# Patient Record
Sex: FEMALE | Race: BLACK OR AFRICAN AMERICAN | NOT HISPANIC OR LATINO | URBAN - METROPOLITAN AREA
[De-identification: names, ages, dates, MRNs, and addresses within clinical notes are randomized per-mention and may not be internally consistent; named-entity substitution may affect disease eponyms.]

---

## 2017-03-13 ENCOUNTER — TRANSCRIBE ORDERS (OUTPATIENT)
Dept: PHYSICAL THERAPY | Facility: HOSPITAL | Age: 47
End: 2017-03-13

## 2017-03-13 DIAGNOSIS — M25.561 RIGHT KNEE PAIN, UNSPECIFIED CHRONICITY: Primary | ICD-10-CM

## 2017-03-23 ENCOUNTER — HOSPITAL ENCOUNTER (OUTPATIENT)
Dept: PHYSICAL THERAPY | Facility: HOSPITAL | Age: 47
Setting detail: THERAPIES SERIES
Discharge: HOME | End: 2017-03-23
Attending: SPECIALIST

## 2017-03-23 DIAGNOSIS — Z98.890 S/P ACL RECONSTRUCTION: Primary | ICD-10-CM

## 2017-03-23 DIAGNOSIS — M25.561 PAIN AND SWELLING OF KNEE, RIGHT: ICD-10-CM

## 2017-03-23 DIAGNOSIS — M62.81 MUSCLE WEAKNESS OF LOWER EXTREMITY: ICD-10-CM

## 2017-03-23 DIAGNOSIS — M25.461 PAIN AND SWELLING OF KNEE, RIGHT: ICD-10-CM

## 2017-03-23 PROCEDURE — 97161 PT EVAL LOW COMPLEX 20 MIN: CPT | Performed by: PHYSICAL THERAPIST

## 2017-03-23 PROCEDURE — 97110 THERAPEUTIC EXERCISES: CPT | Performed by: PHYSICAL THERAPIST

## 2017-03-23 NOTE — PROGRESS NOTES
Outpatient Physical Therapy Ortho Initial Evaluation  [facility]     Patient Name: Funmilayo Concepcion  : 1970  MRN: [id number]  Today's Date: 3/23/2017      Visit Date: 2017    Patient Active Problem List   Diagnosis   • S/P ACL reconstruction   • Pain and swelling of knee        No past medical history on file.     Past Surgical History:   Procedure Laterality Date   • KNEE ACL RECONSTRUCTION  2016       Visit Dx:     ICD-10-CM ICD-9-CM   1. S/P ACL reconstruction Z98.890 V45.89   2. Muscle weakness of lower extremity M62.81 728.87   3. Pain and swelling of knee, right M25.561 719.46    M25.461              Patient History       17 0800          History    Chief Complaint Difficulty with daily activities;Muscle weakness;Pain  -KT      Type of Pain Knee pain  -KT      Date Current Problem(s) Began 17  -KT      Brief Description of Current Complaint Pt tore right ACL in 2016.  [facility] and they were doing a Biodex test 8 weeks post op.   She began having severe pain and swelling after this and was found to have a screw that had slid down.  In  she had arthroscopy with Dr. Valenzuela to reposition the screw.  She feels weak and unsure of her knee.  She c/o popping with movement.   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  She plays centerfield, short stop and pitches.  She works as a  and has a desk job.    -KT      Patient/Caregiver Goals Return to prior level of function;Improve strength  -KT      Occupation/sports/leisure activities travel   -KT      Are you or can you be pregnant No  -KT      Pain     Pain Location Knee  -KT      Pain at Present 4  -KT      Difficulties with ADL's? squatting  -KT      Difficulties with recreational activities? yes softball  -KT      Fall Risk Assessment    Any falls in the past year: No  -KT      Services    Prior Rehab/Home Health Experiences No  -KT      Daily  "Activities    Primary Language English  -KT      Are you able to read Yes  -KT      Are you able to write Yes  -KT      How does patient learn best? Listening  -KT      Teaching needs identified Home Exercise Program;Management of Condition  -KT      Patient is concerned about/has problems with Performing sports, recreation, and play activities  -KT      Does patient have problems with the following? None  -KT      Barriers to learning None  -KT      Pt Participated in POC and Goals Yes  -KT      Safety    Are you being hurt, hit, or frightened by anyone at home or in your life? No  -KT      Are you being neglected by a caregiver No  -KT        User Key  (r) = Recorded By, (t) = Taken By, (c) = Cosigned By    Initials Name Provider Type    KT Afsaneh Holt, PT Physical Therapist                PT Ortho       03/23/17 0800    Posture/Observations    Observations Edema;Incision healing  -KT    Sensation    Sensation WNL? WNL  -KT    Knee Palpation    Patella Right:;Crepitus  -KT    Lateral Joint Line Right:;Tender  -KT    Knee Special Tests    Valgus stress (MCL lesion) Right:;Negative  -KT    Varus stress (LCL lesion) Right:;Negative  -KT    Madeleine’s test (meniscal lesion) Right:;Positive  -KT    Bounce home test (meniscal lesion) Right:;Negative  -KT    Dixie’s test (meniscal lesion) Right:;Positive  -KT    ROM (Range of Motion)    General ROM Detail 0-126 deg  -KT    MMT (Manual Muscle Testing)    General MMT Assessment Detail grossly 4+/5 quads; 5/5 hams; 4+/5 hip abd  -KT    Pathomechanics    Pathomechanics Comments Patella tracking laterally causing loud \"pop\" during extension; when mobilized medially, pop decreases.   -KT    Balance Skills Training    SLS right 10 sec with slightly flexed knee; 20+ sec left  -KT    Stairs Assessment/Treatment    Stairs, Bomoseen Level independent  -KT    Stairs, Technique Used step to step (ascending);step to step (descending)  -KT      User Key  (r) = Recorded By, " (t) = Taken By, (c) = Cosigned By    Initials Name Provider Type    MARTHA Holt PT Physical Therapist                            Therapy Education       03/23/17 0830          Therapy Education    Given HEP;Symptoms/condition management;Pain management;Posture/body mechanics   Discussed healing time; goals for functional strength to return to sport.  -KT      Program New  -KT      How Provided Verbal;Demonstration;Written  -KT      Provided to Patient  -KT      Level of Understanding Teach back education performed;Verbalized;Demonstrated  -KT        User Key  (r) = Recorded By, (t) = Taken By, (c) = Cosigned By    Initials Name Provider Type    MARTHA Holt PT Physical Therapist                PT OP Goals       03/23/17 0800       PT Short Term Goals    STG Date to Achieve 04/06/17  -KT     STG 1 independent with initial ex program for strengthening of LE and core  -KT     STG 2 Pt to perform 5 reps of single leg squat without assist of ll bars  -KT     STG 3 Pt to perform SLS for greater than 20 seconds without LOB.  -KT     Long Term Goals    LTG Date to Achieve 04/22/17  -KT     LTG 1 Pt to increase KOS score by 15 points indicating improved functional level.  -KT     LTG 2 Pt to increase functional strength of LE to 5/5 to perform lunge matrix with assist.  -KT     LTG 3 Return to sport  -KT     Time Calculation    PT Goal Re-Cert Due Date 04/22/17  -KT       User Key  (r) = Recorded By, (t) = Taken By, (c) = Cosigned By    Initials Name Provider Type    MARTHA Holt PT Physical Therapist                PT Assessment/Plan       03/23/17 0800       PT Assessment    Functional Limitations Limitations in functional capacity and performance;Performance in sport activities  -KT     Impairments Pain;Impaired muscle power;Impaired muscle endurance;Muscle strength  -KT     Assessment Comments Pt is a 47 year old female s/p right ACL reconstruction  (july 2016)with autograft recently undergoing  "arthroscopy (january 2017) for a slipped screw.    She has good ROM and MMT is 4+/5, but patient lacks functional strength in closed chain.    She also lacks confidence in the knee during high level activities.   She reports 'popping\" that seems to be a combination of crepitus and laterally tracking patella which should improve with strengthening.   However, should consider patellar stab brace/taping if popping continues.   Pt plays high level travel softball so her goal is to return to sport.   She will need high level functional strengthening, plyometrics, core stabilization and agility retraining  in order to reach this goal.    -KT     Please refer to paper survey for additional self-reported information Yes  -KT     Rehab Potential Excellent  -KT     Patient/caregiver participated in establishment of treatment plan and goals Yes  -KT     Patient would benefit from skilled therapy intervention Yes  -KT     PT Plan    PT Frequency 2x/week  -KT     Predicted Duration of Therapy Intervention (days/wks) 6-8 weeks  -KT     Planned CPT's? PT EVAL MOD COMPLELITY: 33972;PT THER PROC EA 15 MIN: 09424;PT NEUROMUSC RE-EDUCATION EA 15 MIN: 32247;PT ELECTRICAL STIM UNATTEND: ;PT HOT OR COLD PACK TREAT MCARE  -KT     PT Plan Comments Pt to be seen [facility] for high level functional strengthening for LE and core, agility training and balance activities to return to softball.  -KT       User Key  (r) = Recorded By, (t) = Taken By, (c) = Cosigned By    Initials Name Provider Type    MARTHA Holt, PT Physical Therapist                  Exercises       03/23/17 0800          Exercise 1    Exercise Name 1 mini squats  -KT      Cueing 1 Verbal;Demo  -KT      Reps 1 10  -KT      Exercise 2    Exercise Name 2 SLS on blue foam  -KT      Cueing 2 Verbal  -KT      Sets 2 3  -KT      Time (Minutes) 2 2   -KT      Exercise 3    Exercise Name 3 forward lunges  -KT      Cueing 3 Verbal;Demo  -KT      Reps 3 5  -KT      Exercise 4    " Exercise Name 4 forward step ups 6 inch  -KT      Cueing 4 Verbal  -KT      Reps 4 10  -KT      Exercise 5    Exercise Name 5 lateral step ups  -KT      Cueing 5 Verbal  -KT      Reps 5 10  -KT      Exercise 6    Exercise Name 6 single leg mini squat in ll bars  -KT      Cueing 6 Demo  -KT      Reps 6 5  -KT        User Key  (r) = Recorded By, (t) = Taken By, (c) = Cosigned By    Initials Name Provider Type    KT Afsaneh Holt PT Physical Therapist                              Outcome Measures       03/23/17 1400          Knee Outcome Score    Knee Outcome Score Comments 50/80  -KT      Functional Assessment    Outcome Measure Options Knee Outcome Score- ADL  -KT        User Key  (r) = Recorded By, (t) = Taken By, (c) = Cosigned By    Initials Name Provider Type    MARTHA Holt PT Physical Therapist            Time Calculation:   Start Time: 0800  Stop Time: 0900  Time Calculation (min): 60 min     Therapy Charges for Today     Code Description Service Date Service Provider Modifiers Qty    87157094488 HC PT THER PROC EA 15 MIN 3/23/2017 Afsaneh Holt, PT GP 1    84883084507 HC PT HOT OR COLD PACK TREAT MCARE 3/23/2017 Afsaneh Holt, PT GP 1    54765119565 HC PT EVAL LOW COMPLEXITY 2 3/23/2017 Afsaneh Holt, PT GP 1          PT G-Codes  Outcome Measure Options: Knee Outcome Score- ADL         Afsaneh Holt PT  3/23/2017        Addendum:   Did not perform Madeleine's test on this patient.   Incorrectly documented as positive test.   There is was no lateral joint line tenderness on this patient.   Incorrectly documented there was.

## 2017-03-28 ENCOUNTER — APPOINTMENT (OUTPATIENT)
Dept: PHYSICAL THERAPY | Facility: HOSPITAL | Age: 47
End: 2017-03-28

## 2017-03-30 ENCOUNTER — APPOINTMENT (OUTPATIENT)
Dept: PHYSICAL THERAPY | Facility: HOSPITAL | Age: 47
End: 2017-03-30

## 2017-03-30 ENCOUNTER — HOSPITAL ENCOUNTER (OUTPATIENT)
Dept: PHYSICAL THERAPY | Facility: HOSPITAL | Age: 47
Setting detail: THERAPIES SERIES
Discharge: HOME | End: 2017-03-30

## 2017-03-30 DIAGNOSIS — M62.81 MUSCLE WEAKNESS OF LOWER EXTREMITY: ICD-10-CM

## 2017-03-30 DIAGNOSIS — Z98.890 S/P ACL RECONSTRUCTION: Primary | ICD-10-CM

## 2017-03-30 DIAGNOSIS — M25.561 PAIN AND SWELLING OF KNEE, RIGHT: ICD-10-CM

## 2017-03-30 DIAGNOSIS — M25.461 PAIN AND SWELLING OF KNEE, RIGHT: ICD-10-CM

## 2017-03-30 PROCEDURE — 97110 THERAPEUTIC EXERCISES: CPT

## 2017-04-04 ENCOUNTER — APPOINTMENT (OUTPATIENT)
Dept: PHYSICAL THERAPY | Facility: HOSPITAL | Age: 47
End: 2017-04-04

## 2017-04-06 ENCOUNTER — HOSPITAL ENCOUNTER (OUTPATIENT)
Dept: PHYSICAL THERAPY | Facility: HOSPITAL | Age: 47
Setting detail: THERAPIES SERIES
Discharge: HOME | End: 2017-04-06

## 2017-04-06 DIAGNOSIS — M25.461 PAIN AND SWELLING OF KNEE, RIGHT: ICD-10-CM

## 2017-04-06 DIAGNOSIS — M25.561 PAIN AND SWELLING OF KNEE, RIGHT: ICD-10-CM

## 2017-04-06 DIAGNOSIS — M62.81 MUSCLE WEAKNESS OF LOWER EXTREMITY: ICD-10-CM

## 2017-04-06 DIAGNOSIS — Z98.890 S/P ACL RECONSTRUCTION: Primary | ICD-10-CM

## 2017-04-06 PROCEDURE — 97112 NEUROMUSCULAR REEDUCATION: CPT

## 2017-04-06 PROCEDURE — 97110 THERAPEUTIC EXERCISES: CPT

## 2017-04-06 NOTE — PROGRESS NOTES
Outpatient Physical Therapy Ortho Treatment Note  [facility]     Patient Name: Funmilayo Concepcion  : 1970  MRN: [id number]  Today's Date: 2017      Visit Date: 2017    Visit Dx:    ICD-10-CM ICD-9-CM   1. S/P ACL reconstruction Z98.890 V45.89   2. Muscle weakness of lower extremity M62.81 728.87   3. Pain and swelling of knee, right M25.561 719.46    M25.461        Patient Active Problem List   Diagnosis   • S/P ACL reconstruction   • Pain and swelling of knee        No past medical history on file.     Past Surgical History:   Procedure Laterality Date   • KNEE ACL RECONSTRUCTION  2016                             PT Assessment/Plan       17 1832       PT Assessment    Assessment Comments Pt continues to demonstrate poor eccentric control, however form with SL squat and step ups is improving. Pt gaining strength in quad musculature secondary to exercises and is practicing correct stair mechanics at home.   -CN     PT Plan    PT Plan Comments Continue with quad strengthening and stability exercises as tolerated.  -CN       User Key  (r) = Recorded By, (t) = Taken By, (c) = Cosigned By    Initials Name Provider Type    CN Joann Malcolm, PT Physical Therapist                    Exercises       17 1700          Subjective Comments    Subjective Comments I hurt my back over the weekend moving a table in my house. The knee feels ok though. I have been trying to do the exercsies at home and I think I'm getting better.   -CN      Subjective Pain    Able to rate subjective pain? yes  -CN      Pre-Treatment Pain Level 3  -CN      Exercise 1    Exercise Name 1 Mini squats on upside down bosu  -CN      Cueing 1 Verbal;Demo  -CN      Reps 1 15  -CN      Exercise 2    Exercise Name 2 Leg press  -CN      Cueing 2 Verbal  -CN      Weights/Plates 2 --   80#  -CN      Reps 2 20  -CN      Exercise 3    Exercise Name 3 Forward lunges onto upside down bosu  -CN      Cueing 3 Verbal;Demo  -CN       Reps 3 10  -CN      Exercise 4    Exercise Name 4 forward step ups 6 inch  -CN      Cueing 4 Verbal  -CN      Reps 4 15  -CN      Exercise 6    Exercise Name 6 single leg mini squat in ll bars  -CN      Cueing 6 Demo  -CN      Reps 6 15  -CN      Exercise 7    Exercise Name 7 Bike  -CN      Time (Minutes) 7 5  -CN      Exercise 8    Exercise Name 8 SLR with quad set  -CN      Cueing 8 Verbal  -CN      Weights/Plates 8 4  -CN      Sets 8 2  -CN      Reps 8 10  -CN      Exercise 9    Exercise Name 9 SLR with ER with quad set  -CN      Cueing 9 Verbal  -CN      Weights/Plates 9 4  -CN      Sets 9 2  -CN      Reps 9 10  -CN      Exercise 10    Exercise Name 10 Sidelying abduction  -CN      Cueing 10 Verbal  -CN      Weights/Plates 10 4  -CN      Sets 10 2  -CN      Reps 10 10  -CN      Exercise 11    Exercise Name 11 LAQ with pillow squeeze  -CN      Cueing 11 Demo  -CN      Weights/Plates 11 4  -CN      Sets 11 2  -CN      Reps 11 10  -CN      Exercise 12    Exercise Name 12 Single leg bridges on R  -CN      Cueing 12 Demo  -CN      Reps 12 15  -CN        User Key  (r) = Recorded By, (t) = Taken By, (c) = Cosigned By    Initials Name Provider Type    ASHLY Malcolm, PT Physical Therapist                               PT OP Goals       04/06/17 1800       PT Short Term Goals    STG Date to Achieve 04/06/17  -CN     STG 1 independent with initial ex program for strengthening of LE and core  -CN     STG 1 Progress Progressing  -CN     STG 2 Pt to perform 5 reps of single leg squat without assist of ll bars  -CN     STG 2 Progress Ongoing  -CN     STG 2 Progress Comments Pt continues to demonstrate poor eccentric control of R LE with SLS and forward lunges.   -CN     STG 3 Pt to perform SLS for greater than 20 seconds without LOB.  -CN     STG 3 Progress Ongoing  -CN     Long Term Goals    LTG Date to Achieve 04/22/17  -CN     LTG 1 Pt to increase KOS score by 15 points indicating improved functional level.   -CN     LTG 1 Progress Ongoing  -CN     LTG 2 Pt to increase functional strength of LE to 5/5 to perform lunge matrix with assist.  -CN     LTG 2 Progress Ongoing  -CN     LTG 3 Return to sport  -CN     LTG 3 Progress Ongoing  -CN       User Key  (r) = Recorded By, (t) = Taken By, (c) = Cosigned By    Initials Name Provider Type    ASHLY Malcolm PT Physical Therapist                Therapy Education       04/06/17 1830          Therapy Education    Given HEP;Symptoms/condition management;Pain management;Posture/body mechanics  -CN      Program Progressed  -CN      How Provided Verbal  -CN      Provided to Patient  -CN      Level of Understanding Teach back education performed;Verbalized;Demonstrated  -CN        User Key  (r) = Recorded By, (t) = Taken By, (c) = Cosigned By    Initials Name Provider Type    ASHLY Malcolm PT Physical Therapist                Time Calculation:   Start Time: 1715  Stop Time: 1810  Time Calculation (min): 55 min    Therapy Charges for Today     Code Description Service Date Service Provider Modifiers Qty    03383842990  PT THER PROC EA 15 MIN 4/6/2017 Joann Malcolm PT GP 2    [id number]  PT NEUROMUSC RE EDUCATION EA 15 MIN 4/6/2017 Joann Malcolm PT GP 2                    Joann Malcolm PT  4/6/2017

## 2017-04-11 ENCOUNTER — HOSPITAL ENCOUNTER (OUTPATIENT)
Dept: PHYSICAL THERAPY | Facility: HOSPITAL | Age: 47
Setting detail: THERAPIES SERIES
Discharge: HOME | End: 2017-04-11

## 2017-04-11 DIAGNOSIS — M25.561 PAIN AND SWELLING OF KNEE, RIGHT: ICD-10-CM

## 2017-04-11 DIAGNOSIS — Z98.890 S/P ACL RECONSTRUCTION: Primary | ICD-10-CM

## 2017-04-11 DIAGNOSIS — M62.81 MUSCLE WEAKNESS OF LOWER EXTREMITY: ICD-10-CM

## 2017-04-11 DIAGNOSIS — M25.461 PAIN AND SWELLING OF KNEE, RIGHT: ICD-10-CM

## 2017-04-11 PROCEDURE — 97112 NEUROMUSCULAR REEDUCATION: CPT

## 2017-04-11 PROCEDURE — 97110 THERAPEUTIC EXERCISES: CPT

## 2017-04-11 NOTE — PROGRESS NOTES
Outpatient Physical Therapy Ortho Treatment Note  [facility]     Patient Name: Funmilayo Concepcion  : 1970  MRN: [id number]  Today's Date: 2017      Visit Date: 2017    Visit Dx:    ICD-10-CM ICD-9-CM   1. S/P ACL reconstruction Z98.890 V45.89   2. Muscle weakness of lower extremity M62.81 728.87   3. Pain and swelling of knee, right M25.561 719.46    M25.461        Patient Active Problem List   Diagnosis   • S/P ACL reconstruction   • Pain and swelling of knee        No past medical history on file.     Past Surgical History:   Procedure Laterality Date   • KNEE ACL RECONSTRUCTION  2016                             PT Assessment/Plan       17 1843       PT Assessment    Assessment Comments Pt demonstrates improved muscle definition in quadricep tissues and requires decreased cueing for form with squatting activities. Length discussion with pt regarding return to gym activities and safety with recreational activities. Advised pt to perform exercises with light weights at gym including leg press, squats and bike and to use mirror to ensure correct exercise form.   -CN     PT Plan    PT Plan Comments Continue with eccentric loading activities. Consider monster walk and lunges with arm drivers.   -CN       User Key  (r) = Recorded By, (t) = Taken By, (c) = Cosigned By    Initials Name Provider Type    ASHLY Malcolm, PT Physical Therapist                    Exercises       17 1700          Subjective Comments    Subjective Comments My knee is feeling ok, I am still having some trouble with the stairs. My back is still hurting from last week and I pitched some balls this weekend and think I hurt it again bending over to get balls.   -CN      Subjective Pain    Able to rate subjective pain? yes  -CN      Pre-Treatment Pain Level 4  -CN      Post-Treatment Pain Level 5  -CN      Exercise 4    Exercise Name 4 forward step ups 6 inch  -CN      Cueing 4 Verbal  -CN      Reps 4 20   -CN      Exercise 6    Exercise Name 6 single leg mini squat in ll bars  -CN      Cueing 6 Demo  -CN      Reps 6 15  -CN      Exercise 7    Exercise Name 7 Bike  -CN      Time (Minutes) 7 5  -CN      Exercise 8    Exercise Name 8 SLR with quad set  -CN      Cueing 8 Verbal  -CN      Weights/Plates 8 5  -CN      Sets 8 2  -CN      Reps 8 10  -CN      Exercise 9    Exercise Name 9 SLR with ER with quad set  -CN      Cueing 9 Verbal  -CN      Weights/Plates 9 5  -CN      Sets 9 2  -CN      Reps 9 10  -CN      Exercise 10    Exercise Name 10 Sidelying abduction  -CN      Cueing 10 Verbal  -CN      Weights/Plates 10 4  -CN      Sets 10 2  -CN      Reps 10 10  -CN      Exercise 11    Exercise Name 11 LAQ with pillow squeeze  -CN      Cueing 11 Demo  -CN      Weights/Plates 11 5  -CN      Sets 11 2  -CN      Reps 11 10  -CN      Exercise 12    Exercise Name 12 Bridges onto theraball with HS curl  -CN      Cueing 12 Demo  -CN      Reps 12 15  -CN      Exercise 13    Exercise Name 13 B squat, SL to return to neutral  -CN      Cueing 13 Demo  -CN      Sets 13 2  -CN      Reps 13 10  -CN        User Key  (r) = Recorded By, (t) = Taken By, (c) = Cosigned By    Initials Name Provider Type    ASHLY Malcolm, PT Physical Therapist                               PT OP Goals       04/11/17 1700       PT Short Term Goals    STG Date to Achieve 04/06/17  -CN     STG 1 independent with initial ex program for strengthening of LE and core  -CN     STG 1 Progress Met  -CN     STG 2 Pt to perform 5 reps of single leg squat without assist of ll bars  -CN     STG 2 Progress Ongoing  -CN     STG 3 Pt to perform SLS for greater than 20 seconds without LOB.  -CN     STG 3 Progress Ongoing  -CN     Long Term Goals    LTG Date to Achieve 04/22/17  -CN     LTG 1 Pt to increase KOS score by 15 points indicating improved functional level.  -CN     LTG 1 Progress Ongoing  -CN     LTG 2 Pt to increase functional strength of LE to 5/5 to  perform lunge matrix with assist.  -CN     LTG 2 Progress Ongoing  -CN     LTG 3 Return to sport  -CN     LTG 3 Progress Ongoing  -CN       User Key  (r) = Recorded By, (t) = Taken By, (c) = Cosigned By    Initials Name Provider Type    ASHLY Malcolm PT Physical Therapist                Therapy Education       04/11/17 4926          Therapy Education    Given HEP;Symptoms/condition management;Pain management;Posture/body mechanics  -CN      Program Progressed  -CN      How Provided Verbal  -CN      Provided to Patient  -CN      Level of Understanding Teach back education performed;Verbalized;Demonstrated  -CN        User Key  (r) = Recorded By, (t) = Taken By, (c) = Cosigned By    Initials Name Provider Type    ASHLY Malcolm PT Physical Therapist                Time Calculation:   Start Time: 1745  Stop Time: 1830  Time Calculation (min): 45 min    Therapy Charges for Today     Code Description Service Date Service Provider Modifiers Qty    98726016886  PT THER PROC EA 15 MIN 4/11/2017 Joann Malcolm, PT GP 2    [id number]  PT NEUROMUSC RE EDUCATION EA 15 MIN 4/11/2017 Joann Malcolm PT GP 1                    Joann Malcolm PT  4/11/2017

## 2017-04-13 ENCOUNTER — HOSPITAL ENCOUNTER (OUTPATIENT)
Dept: PHYSICAL THERAPY | Facility: HOSPITAL | Age: 47
Setting detail: THERAPIES SERIES
Discharge: HOME | End: 2017-04-13

## 2017-04-13 DIAGNOSIS — M25.461 PAIN AND SWELLING OF KNEE, RIGHT: ICD-10-CM

## 2017-04-13 DIAGNOSIS — M62.81 MUSCLE WEAKNESS OF LOWER EXTREMITY: ICD-10-CM

## 2017-04-13 DIAGNOSIS — M25.561 PAIN AND SWELLING OF KNEE, RIGHT: ICD-10-CM

## 2017-04-13 DIAGNOSIS — Z98.890 S/P ACL RECONSTRUCTION: Primary | ICD-10-CM

## 2017-04-13 PROCEDURE — 97112 NEUROMUSCULAR REEDUCATION: CPT

## 2017-04-13 PROCEDURE — 97110 THERAPEUTIC EXERCISES: CPT

## 2017-04-13 NOTE — PROGRESS NOTES
Outpatient Physical Therapy Ortho Treatment Note  [facility]     Patient Name: Funmilayo Concepcion  : 1970  MRN: [id number]  Today's Date: 2017      Visit Date: 2017    Visit Dx:    ICD-10-CM ICD-9-CM   1. S/P ACL reconstruction Z98.890 V45.89   2. Muscle weakness of lower extremity M62.81 728.87   3. Pain and swelling of knee, right M25.561 719.46    M25.461        Patient Active Problem List   Diagnosis   • S/P ACL reconstruction   • Pain and swelling of knee        No past medical history on file.     Past Surgical History:   Procedure Laterality Date   • KNEE ACL RECONSTRUCTION  2016                             PT Assessment/Plan       17 1836       PT Assessment    Assessment Comments Pt continuing to improving with stability and tolerance to eccentric loading. However, pt unable to control stair descension, using UEs vs knee flexion to lower down. Pt to continue with SL squats at home and increase knee flexion with eccentric exercises.   -CN     PT Plan    PT Plan Comments Consider lunge matrix and monster walks next visit.   -CN       User Key  (r) = Recorded By, (t) = Taken By, (c) = Cosigned By    Initials Name Provider Type    CN Joann Malcolm, PT Physical Therapist                    Exercises       17 1800          Subjective Comments    Subjective Comments It was a little sore after last time, but feels good today. I notice that my knee hurts more on days when I wear heels.   -CN      Subjective Pain    Able to rate subjective pain? yes  -CN      Pre-Treatment Pain Level 3  -CN      Exercise 4    Exercise Name 4 forward step ups 6 inch  -CN      Cueing 4 Verbal  -CN      Reps 4 20  -CN      Exercise 5    Exercise Name 5 Calf stretch  -CN      Cueing 5 Verbal  -CN      Reps 5 3  -CN      Time (Seconds) 5 20  -CN      Exercise 7    Exercise Name 7 Bike  -CN      Time (Minutes) 7 5  -CN      Exercise 8    Exercise Name 8 SLR with quad set  -CN      Cueing 8  Verbal  -CN      Weights/Plates 8 5  -CN      Sets 8 2  -CN      Reps 8 10  -CN      Exercise 9    Exercise Name 9 SLR with ER with quad set  -CN      Cueing 9 Verbal  -CN      Weights/Plates 9 5  -CN      Sets 9 2  -CN      Reps 9 10  -CN      Exercise 10    Exercise Name 10 Sidelying abduction  -CN      Cueing 10 Verbal  -CN      Weights/Plates 10 5  -CN      Sets 10 2  -CN      Reps 10 10  -CN      Exercise 11    Exercise Name 11 LAQ with pillow squeeze  -CN      Cueing 11 Demo  -CN      Weights/Plates 11 5  -CN      Sets 11 2  -CN      Reps 11 10  -CN      Exercise 12    Exercise Name 12 Bridges onto theraball with HS curl  -CN      Cueing 12 Demo  -CN      Reps 12 20  -CN        User Key  (r) = Recorded By, (t) = Taken By, (c) = Cosigned By    Initials Name Provider Type    ASHLY Malcolm PT Physical Therapist                                   Therapy Education       04/13/17 1813          Therapy Education    Given HEP;Symptoms/condition management;Pain management;Posture/body mechanics  -CN      Program Reinforced  -CN      How Provided Verbal  -CN      Provided to Patient  -CN      Level of Understanding Teach back education performed;Verbalized;Demonstrated  -CN        User Key  (r) = Recorded By, (t) = Taken By, (c) = Cosigned By    Initials Name Provider Type    ASHLY Malcolm PT Physical Therapist                Time Calculation:   Start Time: 1745  Stop Time: 1830  Time Calculation (min): 45 min    Therapy Charges for Today     Code Description Service Date Service Provider Modifiers Qty    14590443955 HC PT THER PROC EA 15 MIN 4/13/2017 Joann Malcolm, PT GP 2    33678513839 HC PT NEUROMUSC RE EDUCATION EA 15 MIN 4/13/2017 Joann Malcolm, PT GP 1                    Joann Malcolm PT  4/13/2017

## 2017-04-20 ENCOUNTER — APPOINTMENT (OUTPATIENT)
Dept: PHYSICAL THERAPY | Facility: HOSPITAL | Age: 47
End: 2017-04-20

## 2018-07-05 ENCOUNTER — HOSPITAL ENCOUNTER (OUTPATIENT)
Dept: ONCOLOGY | Facility: CLINIC | Age: 48
Setting detail: INFUSION SERIES
Discharge: HOME | End: 2018-07-05
Attending: INTERNAL MEDICINE | Admitting: INTERNAL MEDICINE

## 2018-07-05 ENCOUNTER — CLINICAL SUPPORT (OUTPATIENT)
Dept: ONCOLOGY | Facility: HOSPITAL | Age: 48
End: 2018-07-05

## 2018-07-05 NOTE — PROGRESS NOTES
PATIENTS ONCOLOGY RECORD LOCATED IN [facility]      Subjective     Name:  KASIA TORBIIO     Date:  2018  Address:  [address] [place] [address]  Home: [phone]  :  1970 AGE:  48 y.o.        RECORDS OBTAINED:  Patients Oncology Record is located in [facility] [[email or web address]]

## 2020-09-16 ENCOUNTER — TRANSCRIBE ORDERS (OUTPATIENT)
Dept: CARDIOLOGY | Facility: HOSPITAL | Age: 50
End: 2020-09-16

## 2020-09-16 ENCOUNTER — HOSPITAL ENCOUNTER (OUTPATIENT)
Dept: CARDIOLOGY | Facility: HOSPITAL | Age: 50
Discharge: HOME | End: 2020-09-16

## 2020-09-16 ENCOUNTER — LAB (OUTPATIENT)
Dept: LAB | Facility: HOSPITAL | Age: 50
End: 2020-09-16

## 2020-09-16 ENCOUNTER — TRANSCRIBE ORDERS (OUTPATIENT)
Dept: ADMINISTRATIVE | Facility: HOSPITAL | Age: 50
End: 2020-09-16

## 2020-09-16 DIAGNOSIS — M25.50 ARTHRALGIA, UNSPECIFIED JOINT: ICD-10-CM

## 2020-09-16 DIAGNOSIS — M25.50 ARTHRALGIA, UNSPECIFIED JOINT: Primary | ICD-10-CM

## 2020-09-16 DIAGNOSIS — Z01.811 PRE-OP CHEST EXAM: Primary | ICD-10-CM

## 2020-09-16 LAB
ALBUMIN SERPL-MCNC: 4.2 G/DL (ref 3.5–5.2)
ALBUMIN/GLOB SERPL: 1.3 G/DL
ALP SERPL-CCNC: 151 U/L (ref 39–117)
ALT SERPL W P-5'-P-CCNC: 24 U/L (ref 1–33)
ANION GAP SERPL CALCULATED.3IONS-SCNC: 9.7 MMOL/L (ref 5–15)
APTT PPP: 26.5 SECONDS (ref 22.7–35.4)
AST SERPL-CCNC: 27 U/L (ref 1–32)
BILIRUB SERPL-MCNC: 0.3 MG/DL (ref 0–1.2)
BILIRUB UR QL STRIP: NEGATIVE
BUN SERPL-MCNC: 15 MG/DL (ref 6–20)
BUN/CREAT SERPL: 14.3 (ref 7–25)
CALCIUM SPEC-SCNC: 9.2 MG/DL (ref 8.6–10.5)
CHLORIDE SERPL-SCNC: 105 MMOL/L (ref 98–107)
CLARITY UR: CLEAR
CO2 SERPL-SCNC: 25.3 MMOL/L (ref 22–29)
COLOR UR: YELLOW
CREAT SERPL-MCNC: 1.05 MG/DL (ref 0.57–1)
DEPRECATED RDW RBC AUTO: 42.1 FL (ref 37–54)
EOSINOPHIL # BLD MANUAL: 0.12 10*3/MM3 (ref 0–0.4)
EOSINOPHIL NFR BLD MANUAL: 2 % (ref 0.3–6.2)
ERYTHROCYTE [DISTWIDTH] IN BLOOD BY AUTOMATED COUNT: 12.6 % (ref 12.3–15.4)
GFR SERPL CREATININE-BSD FRML MDRD: 55 ML/MIN/1.73
GLOBULIN UR ELPH-MCNC: 3.3 GM/DL
GLUCOSE SERPL-MCNC: 89 MG/DL (ref 65–99)
GLUCOSE UR STRIP-MCNC: NEGATIVE MG/DL
HCT VFR BLD AUTO: 39.5 % (ref 34–46.6)
HGB BLD-MCNC: 13.7 G/DL (ref 12–15.9)
HGB UR QL STRIP.AUTO: NEGATIVE
INR PPP: 0.95 (ref 0.9–1.1)
KETONES UR QL STRIP: NEGATIVE
LEUKOCYTE ESTERASE UR QL STRIP.AUTO: NEGATIVE
LYMPHOCYTES # BLD MANUAL: 4.34 10*3/MM3 (ref 0.7–3.1)
LYMPHOCYTES NFR BLD MANUAL: 7 % (ref 5–12)
LYMPHOCYTES NFR BLD MANUAL: 73 % (ref 19.6–45.3)
MCH RBC QN AUTO: 31.8 PG (ref 26.6–33)
MCHC RBC AUTO-ENTMCNC: 34.7 G/DL (ref 31.5–35.7)
MCV RBC AUTO: 91.6 FL (ref 79–97)
MONOCYTES # BLD AUTO: 0.42 10*3/MM3 (ref 0.1–0.9)
NEUTROPHILS # BLD AUTO: 0.95 10*3/MM3 (ref 1.7–7)
NEUTROPHILS NFR BLD MANUAL: 16 % (ref 42.7–76)
NITRITE UR QL STRIP: NEGATIVE
PH UR STRIP.AUTO: 7.5 [PH] (ref 5–8)
PLAT MORPH BLD: NORMAL
PLATELET # BLD AUTO: 378 10*3/MM3 (ref 140–450)
PMV BLD AUTO: 9.2 FL (ref 6–12)
POTASSIUM SERPL-SCNC: 4.4 MMOL/L (ref 3.5–5.2)
PROT SERPL-MCNC: 7.5 G/DL (ref 6–8.5)
PROT UR QL STRIP: NEGATIVE
PROTHROMBIN TIME: 12.6 SECONDS (ref 11.7–14.2)
RBC # BLD AUTO: 4.31 10*6/MM3 (ref 3.77–5.28)
RBC MORPH BLD: NORMAL
SODIUM SERPL-SCNC: 140 MMOL/L (ref 136–145)
SP GR UR STRIP: 1.01 (ref 1–1.03)
UROBILINOGEN UR QL STRIP: NORMAL
VARIANT LYMPHS NFR BLD MANUAL: 2 % (ref 0–5)
WBC # BLD AUTO: 5.95 10*3/MM3 (ref 3.4–10.8)
WBC MORPH BLD: NORMAL

## 2020-09-16 PROCEDURE — 85610 PROTHROMBIN TIME: CPT

## 2020-09-16 PROCEDURE — 80053 COMPREHEN METABOLIC PANEL: CPT

## 2020-09-16 PROCEDURE — 36415 COLL VENOUS BLD VENIPUNCTURE: CPT

## 2020-09-16 PROCEDURE — 85007 BL SMEAR W/DIFF WBC COUNT: CPT

## 2020-09-16 PROCEDURE — 93010 ELECTROCARDIOGRAM REPORT: CPT | Performed by: INTERNAL MEDICINE

## 2020-09-16 PROCEDURE — 85025 COMPLETE CBC W/AUTO DIFF WBC: CPT

## 2020-09-16 PROCEDURE — 93005 ELECTROCARDIOGRAM TRACING: CPT

## 2020-09-16 PROCEDURE — 85730 THROMBOPLASTIN TIME PARTIAL: CPT | Performed by: ORTHOPAEDIC SURGERY

## 2020-09-16 PROCEDURE — 81003 URINALYSIS AUTO W/O SCOPE: CPT

## 2020-10-09 ENCOUNTER — HOSPITAL ENCOUNTER (OUTPATIENT)
Dept: PHYSICAL THERAPY | Facility: HOSPITAL | Age: 50
Setting detail: THERAPIES SERIES
Discharge: HOME | End: 2020-10-09

## 2020-10-09 DIAGNOSIS — Z47.89 ORTHOPEDIC AFTERCARE: Primary | ICD-10-CM

## 2020-10-09 DIAGNOSIS — Z96.651 S/P TKR (TOTAL KNEE REPLACEMENT), RIGHT: ICD-10-CM

## 2020-10-09 DIAGNOSIS — M25.561 ACUTE PAIN OF RIGHT KNEE: ICD-10-CM

## 2020-10-09 PROCEDURE — 97161 PT EVAL LOW COMPLEX 20 MIN: CPT

## 2020-10-09 PROCEDURE — 97110 THERAPEUTIC EXERCISES: CPT

## 2020-10-09 NOTE — THERAPY EVALUATION
Outpatient Physical Therapy Ortho Initial Evaluation  [facility]     Patient Name: Funmilayo Concepcion  : 1970  MRN: [id number]  Today's Date: 10/9/2020      Visit Date: 10/09/2020    Patient Active Problem List   Diagnosis   • S/P ACL reconstruction   • Pain and swelling of knee        History reviewed. No pertinent past medical history.     Past Surgical History:   Procedure Laterality Date   • KNEE ACL RECONSTRUCTION  2016       Visit Dx:     ICD-10-CM ICD-9-CM   1. Orthopedic aftercare  Z47.89 V54.9   2. S/P TKR (total knee replacement), right  Z96.651 V43.65   3. Acute pain of right knee  M25.561 719.46         Patient History     Row Name 10/09/20 0700             History    Chief Complaint  Pain  -LB      Type of Pain  Knee pain  -LB      Date Current Problem(s) Began  20  -LB      Brief Description of Current Complaint  Pt s/p R TKR 20. She enters without AD reporting she does not need it. She had HH until yesterday. She reports she got 93 deg yesterday. 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 She has a few steps to get into house. She has a basement that she does not need daily. She has hx of 2 ACL repairs on R knee. PT was not successful after second surgery. She plays softball recreationally.  -LB      Previous treatment for THIS PROBLEM  Surgery  -LB      Surgery Date:  20  -LB      Patient/Caregiver Goals  Relieve pain;Return to prior level of function;Improve mobility;Improve strength;Know what to do to help the symptoms  -LB      Hand Dominance  right-handed  -LB      Occupation/sports/leisure activities  softball, HR  -LB      Patient seeing anyone else for problem(s)?  yes  -LB      How has patient tried to help current problem?  ice, exercise  -LB      What clinical tests have you had for this problem?  X-ray  -LB      Results of Clinical Tests  well aligned prosthetic  -LB      History of Previous Related Injuries  2 ACLs  repairs failed R knee  -LB      Are you or can you be pregnant  No  -LB         Pain     Pain Location  Knee  -LB      Pain at Present  5  -LB      Pain at Best  3  -LB      Pain at Worst  8  -LB      Pain Frequency  Constant/continuous  -LB      Pain Description  Tightness;Sharp;Aching  -LB      What Performance Factors Make the Current Problem(s) WORSE?  moving knee, walking  -LB      What Performance Factors Make the Current Problem(s) BETTER?  sitting, ice, medication  -LB      Pain Comments  It is worse trying to sleep.  -LB      Tolerance Time- Standing  short time periods tolerated  -LB      Tolerance Time- Sitting  no issue; some stiffness  -LB      Tolerance Time- Walking  short distances with limp  -LB      Tolerance Time- Lying  difficulty getting comfortable  -LB      Is your sleep disturbed?  Yes  -LB      What position do you sleep in?  Supine  -LB      Difficulties at work?  Off work for 4-6 weeks  -LB      Difficulties with ADL's?  Able to perform with increased time.  -LB      Difficulties with recreational activities?  unable  -LB         Fall Risk Assessment    Any falls in the past year:  No  -LB         Services    Prior Rehab/Home Health Experiences  No  -LB      Are you currently receiving Home Health services  No  -LB      Do you plan to receive Home Health services in the near future  No  -LB         Daily Activities    Primary Language  English  -LB      Pt Participated in POC and Goals  Yes  -LB         Safety    Are you being hurt, hit, or frightened by anyone at home or in your life?  No  -LB      Are you being neglected by a caregiver  No  -LB        User Key  (r) = Recorded By, (t) = Taken By, (c) = Cosigned By    Initials Name Provider Type    Adrienne Martell PT Physical Therapist          PT Ortho     Row Name 10/09/20 0700       Subjective Comments    Subjective Comments  I am doing pretty well. They told me just to use the walker as needed. I don't think I need it.  -LB        Subjective Pain    Able to rate subjective pain?  yes  -LB    Pre-Treatment Pain Level  5  -LB       Posture/Observations    Posture/Observations Comments  well healing incision- staples removed  -LB       Right Lower Ext    Rt Knee Extension/Flexion AROM  8-90 deg  -LB    Rt Knee Extension/Flexion PROM  8-94 deg  -LB       Left Lower Ext    Lt Knee Extension/Flexion AROM  0-130 deg  -LB       MMT (Manual Muscle Testing)    General MMT Comments  L knee flexion/extension 5/5  -LB       Sensation    Additional Comments  dec sensation around incision  -LB       Lower Extremity Flexibility    Hamstrings  Right:;Moderately limited  -LB    Gastrocnemius  Right:;Moderately limited  -LB       Gait/Stairs (Locomotion)    [place] Level (Gait)  independent  -LB    Assistive Device (Gait)  other (see comments) pt ambulating without AD  -LB    Distance in Feet (Gait)  50  -LB    Pattern (Gait)  step-to  -LB    Deviations/Abnormal Patterns (Gait)  antalgic;barbara decreased;gait speed decreased;stride length decreased;weight shifting decreased  -LB      User Key  (r) = Recorded By, (t) = Taken By, (c) = Cosigned By    Initials Name Provider Type    Adrienne Martell, PT Physical Therapist                      Therapy Education  Education Details: issued HEP, discussed continued emphasis on flexion without compensation, discussed gait mechanics  Given: Symptoms/condition management, HEP, Fall prevention and home safety, Mobility training, Pain management  Program: New  How Provided: Verbal, Demonstration, Written  Provided to: Patient  Level of Understanding: Teach back education performed, Verbalized, Demonstrated     PT OP Goals     Row Name 10/09/20 0800          PT Short Term Goals    STG Date to Achieve  10/23/20  -LB     STG 1  Pt will demonstrate understanding and compliance with initial HEP.  -LB     STG 1 Progress  New  -LB     STG 2  Pt will demonstrate improved R knee flexion from 94 deg to 100 deg or better.  -LB      STG 2 Progress  New  -LB     STG 3  Pt will ambulate with appropriate gait mechanics using SC.  -LB     STG 3 Progress  New  -LB        Long Term Goals    LTG Date to Achieve  11/08/20  -LB     LTG 1  Pt will demonstrate improved AROM of R knee from 8-90 deg to 5-110 deg or better.  -LB     LTG 1 Progress  New  -LB     LTG 2  Pt will demonstrate normalized gait mechanics without AD.  -LB     LTG 2 Progress  New  -LB     LTG 3  Pt will ascend/descend 3 stairs reciprocally using one handrail.  -LB     LTG 3 Progress  New  -LB        Time Calculation    PT Goal Re-Cert Due Date  01/07/21  -LB       User Key  (r) = Recorded By, (t) = Taken By, (c) = Cosigned By    Initials Name Provider Type    Adrienne Martell PT Physical Therapist          PT Assessment/Plan     Row Name 10/09/20 0838          PT Assessment    Functional Limitations  Decreased safety during functional activities;Impaired gait;Impaired locomotion;Limitation in home management;Limitations in community activities;Limitations in functional capacity and performance;Performance in leisure activities;Performance in self-care ADL;Performance in sport activities;Performance in work activities  -LB     Impairments  Balance;Edema;Endurance;Gait;Impaired flexibility;Joint mobility;Locomotion;Muscle strength;Pain;Poor body mechanics;Range of motion;Sensation  -LB     Assessment Comments  Pt is 50 y.o. female referred to outpatient physical therapy for evaluation and treatment of  evolving  right knee pain s/p R TKR 9/24/20 performed by Dr. Green after 2 failed ACL repairs R knee.  Patient presents with dec AROM, PROM, strength RLE, impaired gait mechanics, R knee pain. PMHx consistent with ACL repair in 2017 and 2018. Personal factors affecting her care include playing on semi professional softball team, chronic nature of symptoms. Pt demonstrates signs and symptoms  consistent with referring diagnosis.  Pt scored 65% disability on the KOS ADL. Pt is limited  in their ability to participate in work duties, household tasks, stair negotiation, softball. She will benefit from continued skilled PT services to address functional deficits. Thank you for this referral.  -LB     Please refer to paper survey for additional self-reported information  Yes  -LB     Rehab Potential  Good  -LB     Patient/caregiver participated in establishment of treatment plan and goals  Yes  -LB     Patient would benefit from skilled therapy intervention  Yes  -LB        PT Plan    PT Frequency  2x/week  -LB     Predicted Duration of Therapy Intervention (OT)  12-16 visits  -LB     Planned CPT's?  PT EVAL LOW COMPLEXITY: 88589;PT RE-EVAL: 20642;PT THER PROC EA 15 MIN: 41203;PT THER ACT EA 15 MIN: 57440;PT MANUAL THERAPY EA 15 MIN: 15387;PT NEUROMUSC RE-EDUCATION EA 15 MIN: 28845;PT GAIT TRAINING EA 15 MIN: 55537;PT HOT OR COLD PACK TREAT MCARE;PT ELECTRICAL STIM UNATTEND: ;PT HOT/COLD PACK WC NONMCARE: 90153  -LB     PT Plan Comments  Continue R TKR rehab, focus on gait mechanics with SC until they improve enough for independent, R knee flexion.  -LB       User Key  (r) = Recorded By, (t) = Taken By, (c) = Cosigned By    Initials Name Provider Type    LB Adrienne Yi, PT Physical Therapist            OP Exercises     Row Name 10/09/20 0700             Subjective Comments    Subjective Comments  I am doing pretty well. They told me just to use the walker as needed. I don't think I need it.  -LB         Subjective Pain    Able to rate subjective pain?  yes  -LB      Pre-Treatment Pain Level  5  -LB         Total Minutes    85422 - PT Therapeutic Exercise Minutes  25  -LB         Exercise 1    Exercise Name 1  Nustep  -LB      Time 1  5 minutes  -LB      Additional Comments  Level 4  -LB         Exercise 2    Exercise Name 2  seated HS stretch  -LB      Reps 2  3  -LB      Time 2  20  -LB         Exercise 3    Exercise Name 3  seated gastroc stretch  -LB      Reps 3  3  -LB      Time 3  20  -LB          Exercise 4    Exercise Name 4  QS  -LB      Reps 4  10  -LB      Time 4  5  -LB         Exercise 5    Exercise Name 5  SAQ  -LB      Reps 5  10  -LB         Exercise 6    Exercise Name 6  seated/supine heel slide with strap  -LB      Reps 6  10  -LB        User Key  (r) = Recorded By, (t) = Taken By, (c) = Cosigned By    Initials Name Provider Type    Adrienne Martell, PT Physical Therapist                                  Time Calculation:     Start Time: 0745  Stop Time: 0830  Time Calculation (min): 45 min  Total Timed Code Minutes- PT: 25 minute(s)     Therapy Charges for Today     Code Description Service Date Service Provider Modifiers Qty    17446208874 HC PT THER PROC EA 15 MIN 10/9/2020 Adrienne Yi, PT GP 2    06304146148 HC PT EVAL LOW COMPLEXITY 1 10/9/2020 Adrienne Yi, PT GP 1                    Adrienne Yi PT  10/9/2020

## 2020-10-13 ENCOUNTER — HOSPITAL ENCOUNTER (OUTPATIENT)
Dept: PHYSICAL THERAPY | Facility: HOSPITAL | Age: 50
Setting detail: THERAPIES SERIES
Discharge: HOME | End: 2020-10-13

## 2020-10-13 DIAGNOSIS — M25.561 ACUTE PAIN OF RIGHT KNEE: ICD-10-CM

## 2020-10-13 DIAGNOSIS — Z96.651 S/P TKR (TOTAL KNEE REPLACEMENT), RIGHT: ICD-10-CM

## 2020-10-13 DIAGNOSIS — Z47.89 ORTHOPEDIC AFTERCARE: Primary | ICD-10-CM

## 2020-10-13 PROCEDURE — 97140 MANUAL THERAPY 1/> REGIONS: CPT

## 2020-10-13 PROCEDURE — 97110 THERAPEUTIC EXERCISES: CPT

## 2020-10-13 NOTE — THERAPY TREATMENT NOTE
Outpatient Physical Therapy Ortho Treatment Note  [facility]     Patient Name: Funmilayo Concepcion  : 1970  MRN: [id number]  Today's Date: 10/13/2020      Visit Date: 10/13/2020    Visit Dx:    ICD-10-CM ICD-9-CM   1. Orthopedic aftercare  Z47.89 V54.9   2. S/P TKR (total knee replacement), right  Z96.651 V43.65   3. Acute pain of right knee  M25.561 719.46       Patient Active Problem List   Diagnosis   • S/P ACL reconstruction   • Pain and swelling of knee        No past medical history on file.     Past Surgical History:   Procedure Laterality Date   • KNEE ACL RECONSTRUCTION  2016       PT Ortho     Row Name 10/13/20 09       Right Lower Ext    Rt Knee Extension/Flexion AROM  90 degrees flexionn  -MH    Rt Knee Extension/Flexion PROM  99 degrees flexion  -      User Key  (r) = Recorded By, (t) = Taken By, (c) = Cosigned By    Initials Name Provider Type     Funmilayo Yang, PT Physical Therapist                      PT Assessment/Plan     Row Name 10/13/20 0918          PT Assessment    Assessment Comments  Pt. returns for first follow up visit since initial evaluation. Pt. presents to clinic ambulating with SPC; reports knee continues to feel very stiff and tight, particularly into flexion. Focused session on improving knee flexion ROM and gait mechanics. Initiated STM/mobs to R knee with increased guarding and tension in quad, particularly medially. Educated pt. on proper use of cane  (sequencing, height, side). Worked on gait within clinic with cues for heel strike and weight shift.  -        PT Plan    PT Plan Comments  Assess height of cane as pt. was going to buy adjustable one. Continue with TKA protocol (work on knee flexion), continue with gait mechanics (consider kolton kolton)  -       User Key  (r) = Recorded By, (t) = Taken By, (c) = Cosigned By    Initials Name Provider Type    Funmilayo Huff, SHASHANK Physical Therapist          Modalities     Row Name 10/13/20 0900             Ice     Patient reports will apply ice at home to involved area  Yes  -        User Key  (r) = Recorded By, (t) = Taken By, (c) = Cosigned By    Initials Name Provider Type    Funmilayo Huff PT Physical Therapist        OP Exercises     Row Name 10/13/20 0900             Subjective Comments    Subjective Comments  It is just so stiff  -         Subjective Pain    Able to rate subjective pain?  yes  -MH      Pre-Treatment Pain Level  5  -MH      Post-Treatment Pain Level  6  -MH         Total Minutes    96106 - PT Therapeutic Exercise Minutes  35  -MH      07906 - PT Manual Therapy Minutes  10  -MH         Exercise 1    Exercise Name 1  Nustep  -MH      Time 1  5 minutes  -MH         Exercise 2    Exercise Name 2  seated HS stretch  -MH      Reps 2  3  -MH      Time 2  20  -MH         Exercise 3    Exercise Name 3  seated gastroc stretch  -MH      Reps 3  3  -MH      Time 3  20  -MH         Exercise 4    Exercise Name 4  QS  -MH      Reps 4  10  -MH      Time 4  5  -MH         Exercise 5    Exercise Name 5  SAQ  -MH      Reps 5  10  -MH      Time 5  --  -MH         Exercise 6    Exercise Name 6  seated/supine heel slide with strap  -MH      Reps 6  15  -MH      Time 6  5  -MH         Exercise 7    Exercise Name 7  Bike   -MH      Cueing 7  Verbal  -MH      Time 7  4  -MH      Additional Comments  seat 6; forward/backward  -MH         Exercise 8    Exercise Name 8  knee flexion stretch at step  -MH      Cueing 8  Verbal  -MH      Reps 8  10  -MH      Time 8  5  -MH      Additional Comments  AP mobs   -MH         Exercise 9    Exercise Name 9  gait in clinic with SPC  -MH      Cueing 9  Verbal  -MH      Time 9  5 min  -MH      Additional Comments  heel strike, weight shift, sequencing  -MH        User Key  (r) = Recorded By, (t) = Taken By, (c) = Cosigned By    Initials Name Provider Type     Funmilayo Yang, SHASHANK Physical Therapist                      Manual Rx (last 36 hours)      Manual Treatments     Row Name  10/13/20 0900             Total Minutes    40504 - PT Manual Therapy Minutes  10  -         Manual Rx 1    Manual Rx 1 Location  STM/crossfiber quad, hamstring  -      Manual Rx 1 Type  AP mobs grade III/IV knee in loose packed position  -      Manual Rx 1 Grade  patellar mobs  -        User Key  (r) = Recorded By, (t) = Taken By, (c) = Cosigned By    Initials Name Provider Type     Funmilayo Yang, PT Physical Therapist          PT OP Goals     Row Name 10/13/20 0900          PT Short Term Goals    STG Date to Achieve  10/23/20  -     STG 1  Pt will demonstrate understanding and compliance with initial HEP.  -     STG 1 Progress  Ongoing  -     STG 2  Pt will demonstrate improved R knee flexion from 94 deg to 100 deg or better.  -     STG 2 Progress  Ongoing  -     STG 3  Pt will ambulate with appropriate gait mechanics using SC.  -     STG 3 Progress  Ongoing  -        Long Term Goals    LTG Date to Achieve  11/08/20  -     LTG 1  Pt will demonstrate improved AROM of R knee from 8-90 deg to 5-110 deg or better.  -     LTG 1 Progress  Ongoing  -     LTG 2  Pt will demonstrate normalized gait mechanics without AD.  -     LTG 2 Progress  Ongoing  [facility]     LTG 3  Pt will ascend/descend 3 stairs reciprocally using one handrail.  -     LTG 3 Progress  Ongoing  -       User Key  (r) = Recorded By, (t) = Taken By, (c) = Cosigned By    Initials Name Provider Type     Funmilayo Yang, PT Physical Therapist          Therapy Education  Education Details: Proper use and height of cane (pt. cane was not adjusstable and slightly short for patient, however, pt. reports shhe is going to buy and adjustable one)  Given: Symptoms/condition management, Pain management, Mobility training, Posture/body mechanics  Program: Reinforced  How Provided: Verbal, Demonstration  Provided to: Patient  Level of Understanding: Teach back education performed, Verbalized, Demonstrated              Time  Calculation:   Start Time: 0830  Stop Time: 0915  Time Calculation (min): 45 min  Total Timed Code Minutes- PT: 45 minute(s)  Therapy Charges for Today     Code Description Service Date Service Provider Modifiers Qty    22145400721 HC PT MANUAL THERAPY EA 15 MIN 10/13/2020 Funmilayo Yang, PT  1    57608230727 HC PT THER PROC EA 15 MIN 10/13/2020 Funmilayo Yang, PT  2                    Funmilayo Yang, PT  10/13/2020

## 2020-10-16 ENCOUNTER — HOSPITAL ENCOUNTER (OUTPATIENT)
Dept: PHYSICAL THERAPY | Facility: HOSPITAL | Age: 50
Setting detail: THERAPIES SERIES
Discharge: HOME | End: 2020-10-16

## 2020-10-16 DIAGNOSIS — Z47.89 ORTHOPEDIC AFTERCARE: Primary | ICD-10-CM

## 2020-10-16 DIAGNOSIS — Z96.651 S/P TKR (TOTAL KNEE REPLACEMENT), RIGHT: ICD-10-CM

## 2020-10-16 DIAGNOSIS — M25.561 ACUTE PAIN OF RIGHT KNEE: ICD-10-CM

## 2020-10-16 PROCEDURE — 97110 THERAPEUTIC EXERCISES: CPT

## 2020-10-16 PROCEDURE — 97140 MANUAL THERAPY 1/> REGIONS: CPT

## 2020-10-16 NOTE — THERAPY TREATMENT NOTE
Outpatient Physical Therapy Ortho Treatment Note  [facility]     Patient Name: Funmilayo Concepcion  : 1970  MRN: [id number]  Today's Date: 10/16/2020      Visit Date: 10/16/2020    Visit Dx:    ICD-10-CM ICD-9-CM   1. Orthopedic aftercare  Z47.89 V54.9   2. S/P TKR (total knee replacement), right  Z96.651 V43.65   3. Acute pain of right knee  M25.561 719.46       Patient Active Problem List   Diagnosis   • S/P ACL reconstruction   • Pain and swelling of knee        No past medical history on file.     Past Surgical History:   Procedure Laterality Date   • KNEE ACL RECONSTRUCTION  2016       PT Ortho     Row Name 10/16/20 1100       Subjective Comments    Subjective Comments  Played competitive softball as recent as Labor Day weekend.....Knee pain pretty severe.  Pain now is different than pre-op.  -SI       Subjective Pain    Able to rate subjective pain?  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  Nice increase AROM measured today.  Pt to focus on medium stretches 5 reps 30 seconds, flexion and HS , after warm up wwth other ex and do 3 times a day.  Focus strength later  -SI       User Key  (r) = Recorded By, (t) = Taken By, (c) = Cosigned By    Initials Name Provider Type    Perla Doss PTA Physical Therapy Assistant            OP Exercises     Row Name 10/16/20 1200 10/16/20 1100          Subjective Comments    Subjective Comments  --  Played  competitive softball as recent as Labor Day weekend.....Knee pain pretty severe.  Pain now is different than pre-op.  -SI        Subjective Pain    Able to rate subjective pain?  --  yes  -SI        Total Minutes    51069 - PT Therapeutic Exercise Minutes  --  35  -SI     26880 - PT Manual Therapy Minutes  --  10  -SI        Exercise 1    Exercise Name 1  Nustep  -SI  --     Time 1  5 minutes  -SI  --        Exercise 2    Exercise Name 2  long sitting HS stretch  -SI  --     Reps 2  3  -SI  --     Time 2  20  -SI  --        Exercise 3    Exercise Name 3  seated gastroc stretch  -SI  --     Reps 3  3  -SI  --     Time 3  20  -SI  --        Exercise 4    Exercise Name 4  QS  -SI  --     Reps 4  10  -SI  --     Time 4  5  -SI  --        Exercise 5    Exercise Name 5  SAQ and LAQ  -SI  --     Reps 5  20  -SI  --     Additional Comments  3 lbs SAQ  -SI  --        Exercise 6    Exercise Name 6  seated/supine heel slide with strap  -SI  --     Reps 6  15  -SI  --     Time 6  5  -SI  --        Exercise 7    Exercise Name 7  Nu-Step  -SI  --     Cueing 7  Verbal  -SI  --     Time 7  4  -SI  --        Exercise 8    Exercise Name 8  knee flexion stretch at step  -SI  --     Cueing 8  Verbal  -SI  --     Reps 8  5  -SI  --     Time 8  20  -SI  --        Exercise 9    Exercise Name 9  gait in clinic with SPC  -SI  --     Cueing 9  Verbal  -SI  --     Time 9  5 min  -SI  --     Additional Comments  Heel strike  at TKE emphasized  -SI  --       User Key  (r) = Recorded By, (t) = Taken By, (c) = Cosigned By    Initials Name Provider Type    SI Perla Gray, PTA Physical Therapy Assistant                      Manual Rx (last 36 hours)      Manual Treatments     Row Name 10/16/20 1100             Total Minutes    80256 - PT Manual Therapy Minutes  10  -SI         Manual Rx 1    Manual Rx 1 Location  right knee (not incisiion)  -SI      Manual Rx 1 Type  pt [id number] in selff massage  -SI        User Key  (r) = Recorded By, (t) =  Taken By, (c) = Cosigned By    Initials Name Provider Type    Perla Doss PTA Physical Therapy Assistant              Therapy Education  Given: HEP, Symptoms/condition management  Program: Reinforced  How Provided: Verbal, Demonstration, Written  Provided to: Patient  Level of Understanding: Teach back education performed              Time Calculation:   Start Time: 1130  Stop Time: 1220  Time Calculation (min): 50 min  Total Timed Code Minutes- PT: 50 minute(s)  Therapy Charges for Today     Code Description Service Date Service Provider Modifiers Qty    57095590614 HC PT THER PROC EA 15 MIN 10/16/2020 Perla Gray PTA GP 2    53566873479 HC PT MANUAL THERAPY EA 15 MIN 10/16/2020 Perla Gray PTA GP 1                    Perla Gray PTA  10/16/2020

## 2020-10-20 ENCOUNTER — HOSPITAL ENCOUNTER (OUTPATIENT)
Dept: PHYSICAL THERAPY | Facility: HOSPITAL | Age: 50
Setting detail: THERAPIES SERIES
Discharge: HOME | End: 2020-10-20

## 2020-10-20 DIAGNOSIS — M25.561 ACUTE PAIN OF RIGHT KNEE: ICD-10-CM

## 2020-10-20 DIAGNOSIS — Z47.89 ORTHOPEDIC AFTERCARE: Primary | ICD-10-CM

## 2020-10-20 DIAGNOSIS — Z96.651 S/P TKR (TOTAL KNEE REPLACEMENT), RIGHT: ICD-10-CM

## 2020-10-20 PROCEDURE — 97110 THERAPEUTIC EXERCISES: CPT

## 2020-10-20 NOTE — THERAPY TREATMENT NOTE
Outpatient Physical Therapy Ortho Treatment Note  [facility]     Patient Name: Funmilayo Concepcion  : 1970  MRN: [id number]  Today's Date: 10/20/2020      Visit Date: 10/20/2020    Visit Dx:    ICD-10-CM ICD-9-CM   1. Orthopedic aftercare  Z47.89 V54.9   2. S/P TKR (total knee replacement), right  Z96.651 V43.65   3. Acute pain of right knee  M25.561 719.46       Patient Active Problem List   Diagnosis   • S/P ACL reconstruction   • Pain and swelling of knee        No past medical history on file.     Past Surgical History:   Procedure Laterality Date   • KNEE ACL RECONSTRUCTION  2016                       PT Assessment/Plan     Row Name 10/20/20 1554          PT Assessment    Assessment Comments  Pt with improved quality of both flexion and extension and improved quadriceps activation with quad set. Assisted flexion to 108 deg and extension to 6 deg today. Pt eager to return to sport. Gait remains antalgic and pt without AD today. SHe reports she does use SC occasionally.  -LB        PT Plan    PT Plan Comments  Continue ROM, progress strengthening as ROM improves.  -LB       User Key  (r) = Recorded By, (t) = Taken By, (c) = Cosigned By    Initials Name Provider Type    Adrienne Martell, PT Physical Therapist            OP Exercises     Row Name 10/20/20 1400             Subjective Comments    Subjective Comments  I am hurting. I am just ready to be better. I was sore after last time. I saw Dr. Green yesterday and he said I was right on track.  -LB         Subjective Pain    Able to rate subjective pain?  yes  -LB      Pre-Treatment Pain Level  4  -LB         Total Minutes    74336 - PT Therapeutic Exercise Minutes  40  -LB         Exercise 1    Exercise Name 1  Nustep  -LB      Time 1  5 minutes  -LB         Exercise 2    Exercise Name 2  long sitting HS stretch  -LB      Reps 2  3  -LB      Time 2  20  -LB         Exercise 3    Exercise Name 3  seated gastroc stretch  -LB      Reps 3  3  -LB       Time 3  20  -LB         Exercise 4    Exercise Name 4  QS  -LB      Sets 4  2  -LB      Reps 4  10  -LB      Time 4  5  -LB      Additional Comments  one set towel at knee, one at ankle  -LB         Exercise 5    Exercise Name 5  SAQ and LAQ  -LB      Reps 5  20  -LB      Additional Comments  3# SAQ  -LB         Exercise 6    Exercise Name 6  seated/supine heel slide with strap  -LB      Reps 6  15  -LB      Time 6  5  -LB         Exercise 7    Exercise Name 7  Nu-Step  -LB      Cueing 7  Verbal  -LB      Time 7  4  -LB         Exercise 8    Exercise Name 8  knee flexion stretch at step  -LB      Cueing 8  Verbal  -LB      Reps 8  5  -LB      Time 8  20  -LB         Exercise 9    Exercise Name 9  --  -LB      Cueing 9  --  -LB      Time 9  --  -LB         Exercise 10    Exercise Name 10  seated HS curl  -LB      Reps 10  15  -LB      Additional Comments  RTB  -LB        User Key  (r) = Recorded By, (t) = Taken By, (c) = Cosigned By    Initials Name Provider Type    LB Adrienne Yi, SHASHANK Physical Therapist                           Therapy Education  Education Details: continued emphasis on ROM, quality of movement  Given: Symptoms/condition management, HEP  Program: Reinforced  How Provided: Verbal  Provided to: Patient  Level of Understanding: Teach back education performed, Verbalized, Demonstrated              Time Calculation:   Start Time: 1400  Stop Time: 1445  Time Calculation (min): 45 min  Total Timed Code Minutes- PT: 43 minute(s)  Therapy Charges for Today     Code Description Service Date Service Provider Modifiers Qty    60654606665 HC PT THER PROC EA 15 MIN 10/20/2020 Adrienne Yi, PT GP 3                    Adrienne Yi PT  10/20/2020

## 2020-10-22 ENCOUNTER — HOSPITAL ENCOUNTER (OUTPATIENT)
Dept: PHYSICAL THERAPY | Facility: HOSPITAL | Age: 50
Setting detail: THERAPIES SERIES
Discharge: HOME | End: 2020-10-22

## 2020-10-22 PROCEDURE — 97110 THERAPEUTIC EXERCISES: CPT

## 2020-10-22 NOTE — THERAPY TREATMENT NOTE
Outpatient Physical Therapy Ortho Treatment Note  [facility]     Patient Name: Funmilayo Concepcion  : 1970  MRN: [id number]  Today's Date: 10/22/2020      Visit Date: 10/22/2020    Visit Dx:  No diagnosis found.    Patient Active Problem List   Diagnosis   • S/P ACL reconstruction   • Pain and swelling of knee        No past medical history on file.     Past Surgical History:   Procedure Laterality Date   • KNEE ACL RECONSTRUCTION  2016       PT Ortho     Row Name 10/22/20 1500       Subjective Pain    Able to rate subjective pain?  yes  -SI       Right Lower Ext    Rt Knee Extension/Flexion AROM  0 to 110  -SI       Gait/Stairs (Locomotion)    21998 - Gait Training Minutes   5  -SI    Pattern (Gait)  step-through emphasis on TKE and heel strike  -SI    Handrail Location (Stairs)  right side (ascending)  -SI    Number of Steps (Stairs)  5  -SI    Ascending Technique (Stairs)  step-over-step  -SI    Descending Technique (Stairs)  step-to-step  -SI    Comment (Gait/Stairs)  pt with nearly normal gait level surfaces with VC's  -SI      User Key  (r) = Recorded By, (t) = Taken By, (c) = Cosigned By    Initials Name Provider Type    Perla Doss PTA Physical Therapy Assistant                      PT Assessment/Plan     Row Name 10/22/20 1524          PT Assessment    Assessment Comments  4 weeks post-op today.  Able to ascend stairs reciprocally,,not to do descending yet.  AROM improving niceley  -SI       User Key  (r) = Recorded By, (t) = Taken By, (c) = Cosigned By    Initials Name Provider Type    Perla Doss PTA Physical Therapy Assistant            OP Exercises     Row Name 10/22/20 1500             Subjective Comments    Subjective Comments  Knee pain but taking meds and using ice  -SI         Subjective Pain    Able to rate subjective pain?  yes  -SI         Total Minutes    84663 - Gait Training Minutes   5  -SI      91685 - PT Therapeutic Exercise Minutes  45  -SI         Exercise 1     Exercise Name 1  Nustep  -SI      Time 1  5 minutes  -SI         Exercise 2    Exercise Name 2  long sitting HS stretch  -SI      Reps 2  3  -SI      Time 2  20  -SI         Exercise 3    Exercise Name 3  seated gastroc stretch  -SI      Reps 3  3  -SI      Time 3  20  -SI         Exercise 4    Exercise Name 4  QS  -SI      Sets 4  2  -SI      Reps 4  10  -SI      Time 4  5  -SI         Exercise 5    Exercise Name 5  SAQ and LAQ  -SI      Reps 5  20  -SI      Additional Comments  3lbs  -SI         Exercise 7    Exercise Name 7  Heel toe lifts sanding  -SI      Cueing 7  Verbal  -SI      Reps 7  15  -SI         Exercise 8    Exercise Name 8  knee flexion stretch at step  -SI      Cueing 8  Verbal  -SI      Reps 8  5  -SI      Time 8  20  -SI         Exercise 9    Exercise Name 9  SLS  -SI      Reps 9  3  -SI      Time 9  20  -SI         Exercise 10    Exercise Name 10  seated HS curl  -SI      Reps 10  20  -SI      Additional Comments  YTB HEP  -SI        User Key  (r) = Recorded By, (t) = Taken By, (c) = Cosigned By    Initials Name Provider Type    Perla Doss PTA Physical Therapy Assistant                       PT OP Goals     Row Name 10/22/20 1500          PT Short Term Goals    STG 1  Pt will demonstrate understanding and compliance with initial HEP.  -SI     STG 1 Progress  Met  -SI     STG 2  Pt will demonstrate improved R knee flexion from 94 deg to 100 deg or better.  -SI     STG 2 Progress  Met  -SI     STG 3  Pt will ambulate with appropriate gait mechanics using SC.  -SI     STG 3 Progress  Met no cane and normal level surfaces  -SI       User Key  (r) = Recorded By, (t) = Taken By, (c) = Cosigned By    Initials Name Provider Type    Perla Doss PTA Physical Therapy Assistant          Therapy Education  Given: HEP, Symptoms/condition management  Program: Progressed  How Provided: Verbal, Demonstration, Written  Provided to: Patient  Level of Understanding: Teach back education  performed              Time Calculation:   Start Time: 1445  Stop Time: 1530  Time Calculation (min): 45 min  Total Timed Code Minutes- PT: 45 minute(s)  Therapy Charges for Today     Code Description Service Date Service Provider Modifiers Qty    09551502174 HC PT THER PROC EA 15 MIN 10/22/2020 Perla Gray, PTA GP 3                    Perla Gray PTA  10/22/2020

## 2020-10-22 NOTE — THERAPY TREATMENT NOTE
Outpatient Physical Therapy Ortho Treatment Note  [facility]     Patient Name: Funmilayo Concepcion  : 1970  MRN: [id number]  Today's Date: 10/22/2020      Visit Date: 10/22/2020    Visit Dx:  No diagnosis found.    Patient Active Problem List   Diagnosis   • S/P ACL reconstruction   • Pain and swelling of knee        No past medical history on file.     Past Surgical History:   Procedure Laterality Date   • KNEE ACL RECONSTRUCTION  2016       PT Ortho     Row Name 10/22/20 1500       Subjective Pain    Able to rate subjective pain?  yes  -SI       Right Lower Ext    Rt Knee Extension/Flexion AROM  0 to 110  -SI       Gait/Stairs (Locomotion)    31990 - Gait Training Minutes   5  -SI    Pattern (Gait)  step-through emphasis on TKE and heel strike  -SI    Handrail Location (Stairs)  right side (ascending)  -SI    Number of Steps (Stairs)  5  -SI    Ascending Technique (Stairs)  step-over-step  -SI    Descending Technique (Stairs)  step-to-step  -SI    Comment (Gait/Stairs)  pt with nearly normal gait level surfaces with VC's  -SI      User Key  (r) = Recorded By, (t) = Taken By, (c) = Cosigned By    Initials Name Provider Type    Perla Doss PTA Physical Therapy Assistant                      PT Assessment/Plan     Row Name 10/22/20 1524          PT Assessment    Assessment Comments  4 weeks post-op today.  Able to ascend stairs reciprocally,,not to do descending yet.  AROM improving niceley  -SI       User Key  (r) = Recorded By, (t) = Taken By, (c) = Cosigned By    Initials Name Provider Type    Perla Doss PTA Physical Therapy Assistant            OP Exercises     Row Name 10/22/20 1500             Subjective Comments    Subjective Comments  Knee pain but taking meds and using ice  -SI         Subjective Pain    Able to rate subjective pain?  yes  -SI         Total Minutes    49422 - Gait Training Minutes   5  -SI      03436 - PT Therapeutic Exercise Minutes  45  -SI         Exercise 1     Exercise Name 1  Nustep  -SI      Time 1  5 minutes  -SI         Exercise 2    Exercise Name 2  long sitting HS stretch  -SI      Reps 2  3  -SI      Time 2  20  -SI         Exercise 3    Exercise Name 3  seated gastroc stretch  -SI      Reps 3  3  -SI      Time 3  20  -SI         Exercise 4    Exercise Name 4  QS  -SI      Sets 4  2  -SI      Reps 4  10  -SI      Time 4  5  -SI         Exercise 5    Exercise Name 5  SAQ and LAQ  -SI      Reps 5  20  -SI      Additional Comments  3lbs  -SI         Exercise 7    Exercise Name 7  Heel toe lifts sanding  -SI      Cueing 7  Verbal  -SI      Reps 7  15  -SI         Exercise 8    Exercise Name 8  knee flexion stretch at step  -SI      Cueing 8  Verbal  -SI      Reps 8  5  -SI      Time 8  20  -SI         Exercise 9    Exercise Name 9  SLS  -SI      Reps 9  3  -SI      Time 9  20  -SI         Exercise 10    Exercise Name 10  seated HS curl  -SI      Reps 10  20  -SI      Additional Comments  YTB HEP  -SI        User Key  (r) = Recorded By, (t) = Taken By, (c) = Cosigned By    Initials Name Provider Type    Perla Doss PTA Physical Therapy Assistant                       PT OP Goals     Row Name 10/22/20 1500          PT Short Term Goals    STG 1  Pt will demonstrate understanding and compliance with initial HEP.  -SI     STG 1 Progress  Met  -SI     STG 2  Pt will demonstrate improved R knee flexion from 94 deg to 100 deg or better.  -SI     STG 2 Progress  Met  -SI     STG 3  Pt will ambulate with appropriate gait mechanics using SC.  -SI     STG 3 Progress  Met no cane and normal level surfaces  -SI       User Key  (r) = Recorded By, (t) = Taken By, (c) = Cosigned By    Initials Name Provider Type    Perla Doss PTA Physical Therapy Assistant          Therapy Education  Given: HEP, Symptoms/condition management  Program: Progressed  How Provided: Verbal, Demonstration, Written  Provided to: Patient  Level of Understanding: Teach back education  performed              Time Calculation:   Start Time: 1445  Stop Time: 1530  Time Calculation (min): 45 min  Total Timed Code Minutes- PT: 45 minute(s)                Perla Gray, PTA  10/22/2020

## 2020-10-27 ENCOUNTER — HOSPITAL ENCOUNTER (OUTPATIENT)
Dept: PHYSICAL THERAPY | Facility: HOSPITAL | Age: 50
Setting detail: THERAPIES SERIES
Discharge: HOME | End: 2020-10-27

## 2020-10-27 DIAGNOSIS — M25.561 ACUTE PAIN OF RIGHT KNEE: ICD-10-CM

## 2020-10-27 DIAGNOSIS — Z47.89 ORTHOPEDIC AFTERCARE: Primary | ICD-10-CM

## 2020-10-27 DIAGNOSIS — Z96.651 S/P TKR (TOTAL KNEE REPLACEMENT), RIGHT: ICD-10-CM

## 2020-10-27 PROCEDURE — 97110 THERAPEUTIC EXERCISES: CPT

## 2020-10-27 NOTE — THERAPY TREATMENT NOTE
Outpatient Physical Therapy Ortho Treatment Note  [facility]     Patient Name: Funmilayo Concepcion  : 1970  MRN: [id number]  Today's Date: 10/27/2020      Visit Date: 10/27/2020    Visit Dx:    ICD-10-CM ICD-9-CM   1. Orthopedic aftercare  Z47.89 V54.9   2. S/P TKR (total knee replacement), right  Z96.651 V43.65   3. Acute pain of right knee  M25.561 719.46       Patient Active Problem List   Diagnosis   • S/P ACL reconstruction   • Pain and swelling of knee        No past medical history on file.     Past Surgical History:   Procedure Laterality Date   • KNEE ACL RECONSTRUCTION  2016       PT Ortho     Row Name 10/27/20 1500       Subjective Comments    Subjective Comments  Pain med at night  -SI       Subjective Pain    Able to rate subjective pain?  yes  -SI       Right Lower Ext    Rt Knee Extension/Flexion AROM  -4 to 114  -SI      User Key  (r) = Recorded By, (t) = Taken By, (c) = Cosigned By    Initials Name Provider Type    Perla Doss PTA Physical Therapy Assistant                      PT Assessment/Plan     Row Name 10/27/20 1616          PT Assessment    Assessment Comments  Pt improving each visit with ROM.  Gait is normal level  surfaces  -SI       User Key  (r) = Recorded By, (t) = Taken By, (c) = Cosigned By    Initials Name Provider Type    Perla Doss PTA Physical Therapy Assistant            OP Exercises     Row Name 10/27/20 1500             Subjective Comments    Subjective Comments  Pain med at night  -SI         Subjective Pain    Able to rate subjective pain?  yes  -SI         Total Minutes    80736 - PT Therapeutic Exercise Minutes  40  -SI         Exercise 1    Exercise Name 1  Nustep  -SI      Time 1  5 minutes  -SI         Exercise 2    Exercise Name 2  long sitting HS stretch  -SI      Reps 2  3  -SI      Time 2  20  -SI         Exercise 3    Exercise Name 3  seated gastroc stretch  -SI      Reps 3  3  -SI      Time 3  20  -SI         Exercise 4    Exercise  "Name 4  TKE standing with Black TB  -SI      Sets 4  2  -SI      Reps 4  10  -SI      Time 4  5  -SI         Exercise 5    Exercise Name 5  SAQ and LAQ  -SI      Reps 5  20  -SI      Additional Comments  5 lbs  -SI         Exercise 6    Exercise Name 6  4 inch step ups forward and lateral  -SI      Reps 6  20  -SI         Exercise 7    Exercise Name 7  Heel toe lifts sanding  -SI      Cueing 7  Verbal  -SI      Reps 7  15  -SI         Exercise 8    Exercise Name 8  knee flexion stretch at step  -SI      Cueing 8  Verbal  -SI      Reps 8  5  -SI      Time 8  20  -SI         Exercise 9    Exercise Name 9  SLS  -SI      Reps 9  3  -SI      Time 9  20  -SI         Exercise 10    Exercise Name 10  seated HS curl  -SI      Reps 10  20  -SI      Additional Comments  red  -SI         Exercise 11    Exercise Name 11  prone \"hang\" for ext  -SI      Time 11  5 min  -SI        User Key  (r) = Recorded By, (t) = Taken By, (c) = Cosigned By    Initials Name Provider Type    SI Perla Gray PTA Physical Therapy Assistant                                          Time Calculation:   Start Time: 1535  Stop Time: 1617  Time Calculation (min): 42 min  Total Timed Code Minutes- PT: 40 minute(s)  Therapy Charges for Today     Code Description Service Date Service Provider Modifiers Qty    84527894561 HC PT THER PROC EA 15 MIN 10/27/2020 Perla Gray PTA GP 3                    Perla Gray PTA  10/27/2020     "

## 2020-10-29 ENCOUNTER — HOSPITAL ENCOUNTER (OUTPATIENT)
Dept: PHYSICAL THERAPY | Facility: HOSPITAL | Age: 50
Setting detail: THERAPIES SERIES
Discharge: HOME | End: 2020-10-29

## 2020-10-29 DIAGNOSIS — Z47.89 ORTHOPEDIC AFTERCARE: Primary | ICD-10-CM

## 2020-10-29 DIAGNOSIS — M25.561 ACUTE PAIN OF RIGHT KNEE: ICD-10-CM

## 2020-10-29 DIAGNOSIS — Z96.651 S/P TKR (TOTAL KNEE REPLACEMENT), RIGHT: ICD-10-CM

## 2020-10-29 PROCEDURE — 97110 THERAPEUTIC EXERCISES: CPT

## 2020-10-29 NOTE — THERAPY TREATMENT NOTE
Outpatient Physical Therapy Ortho Treatment Note  [facility]     Patient Name: Funmilayo Concepcion  : 1970  MRN: [id number]  Today's Date: 10/29/2020      Visit Date: 10/29/2020    Visit Dx:    ICD-10-CM ICD-9-CM   1. Orthopedic aftercare  Z47.89 V54.9   2. S/P TKR (total knee replacement), right  Z96.651 V43.65   3. Acute pain of right knee  M25.561 719.46       Patient Active Problem List   Diagnosis   • S/P ACL reconstruction   • Pain and swelling of knee        No past medical history on file.     Past Surgical History:   Procedure Laterality Date   • KNEE ACL RECONSTRUCTION  2016                       PT Assessment/Plan     Row Name 10/29/20 1617          PT Assessment    Assessment Comments  Pt. continues to progress with mobility, cues required in clinic for heel strike to normalize gait mechanics. Pt. states descending stairs remains most challenging, therefore initiated step downs with focus on eccentric lowering. Pt. tolerated remaining ther ex well without complaint.  -        PT Plan    PT Plan Comments  Continue to progress as tolerated; consider mini squats  -       User Key  (r) = Recorded By, (t) = Taken By, (c) = Cosigned By    Initials Name Provider Type     Trey Funmilayo, PT Physical Therapist            OP Exercises     Row Name 10/29/20 1500             Subjective Comments    Subjective Comments  It is still stiff   -         Subjective Pain    Able to rate subjective pain?  yes  -      Pre-Treatment Pain Level  2  -         Total Minutes    52687 - PT Therapeutic Exercise Minutes  44  -MH         Exercise 1    Exercise Name 1  Nustep  -      Time 1  5 minutes  -         Exercise 2    Exercise Name 2  long sitting HS stretch  -MH      Reps 2  3  -MH      Time 2  20  -MH         Exercise 3    Exercise Name 3  seated gastroc stretch  -MH      Reps 3  3  -MH      Time 3  20  -MH         Exercise 4    Exercise Name 4  TKE standing with Black TB  -      Sets 4  2  -MH   "    Reps 4  10  -MH      Time 4  5  -MH         Exercise 5    Exercise Name 5  SAQ and LAQ  -MH      Reps 5  20  -MH         Exercise 6    Exercise Name 6  4 inch step ups forward and lateral  -      Reps 6  20  -MH         Exercise 7    Exercise Name 7  Heel toe lifts sanding  -MH      Cueing 7  Verbal  -MH      Reps 7  15  -MH         Exercise 8    Exercise Name 8  knee flexion stretch at step  -MH      Cueing 8  Verbal  -      Reps 8  5  -MH      Time 8  20  -MH         Exercise 9    Exercise Name 9  SLS  -MH      Reps 9  3  -MH      Time 9  20  -MH      Additional Comments  on blue foam  -         Exercise 10    Exercise Name 10  seated HS curl  -      Reps 10  20  -MH         Exercise 11    Exercise Name 11  prone \"hang\" for ext  -      Time 11  5 min  -         Exercise 12    Exercise Name 12  4'' step down  -      Cueing 12  Verbal  -      Reps 12  10  -MH      Additional Comments  eccentric lower; finger tip support on // bars  -        User Key  (r) = Recorded By, (t) = Taken By, (c) = Cosigned By    Initials Name Provider Type    Funmilayo Huff, PT Physical Therapist                       PT OP Goals     Row Name 10/29/20 1500          PT Short Term Goals    STG Date to Achieve  10/23/20  -     STG 1  Pt will demonstrate understanding and compliance with initial HEP.  -     STG 1 Progress  Ongoing  -     STG 2  Pt will demonstrate improved R knee flexion from 94 deg to 100 deg or better.  -     STG 2 Progress  Ongoing  [facility]     STG 3  Pt will ambulate with appropriate gait mechanics using SC.  -     STG 3 Progress  Ongoing  [facility]        Long Term Goals    LTG Date to Achieve  11/08/20  -     LTG 1  Pt will demonstrate improved AROM of R knee from 8-90 deg to 5-110 deg or better.  -     LTG 1 Progress  Ongoing  [facility]     LTG 2  Pt will demonstrate normalized gait mechanics without AD.  -     LTG 2 Progress  Ongoing  [facility]     LTG 3  Pt will ascend/descend 3 stairs reciprocally " using one handrail.  -     LTG 3 Progress  Ongoing  -       User Key  (r) = Recorded By, (t) = Taken By, (c) = Cosigned By    Initials Name Provider Type    Funmilayo Huff, SHASHANK Physical Therapist          Therapy Education  Given: Symptoms/condition management, Posture/body mechanics  Program: Reinforced  How Provided: Verbal, Demonstration  Provided to: Patient  Level of Understanding: Teach back education performed, Verbalized              Time Calculation:   Start Time: 1531  Stop Time: 1615  Time Calculation (min): 44 min  Total Timed Code Minutes- PT: 44 minute(s)  Therapy Charges for Today     Code Description Service Date Service Provider Modifiers Qty    82922722569 HC PT THER PROC EA 15 MIN 10/29/2020 Funmilayo Yang, SHASHANK GP 3                    Funmilayo Yang PT  10/29/2020

## 2020-11-03 ENCOUNTER — HOSPITAL ENCOUNTER (OUTPATIENT)
Dept: PHYSICAL THERAPY | Facility: HOSPITAL | Age: 50
Setting detail: THERAPIES SERIES
Discharge: HOME | End: 2020-11-03

## 2020-11-03 DIAGNOSIS — Z96.651 S/P TKR (TOTAL KNEE REPLACEMENT), RIGHT: ICD-10-CM

## 2020-11-03 DIAGNOSIS — Z47.89 ORTHOPEDIC AFTERCARE: Primary | ICD-10-CM

## 2020-11-03 DIAGNOSIS — M25.561 ACUTE PAIN OF RIGHT KNEE: ICD-10-CM

## 2020-11-03 PROCEDURE — 97110 THERAPEUTIC EXERCISES: CPT | Performed by: PHYSICAL THERAPIST

## 2020-11-03 NOTE — THERAPY TREATMENT NOTE
Outpatient Physical Therapy Ortho Treatment Note  [facility]     Patient Name: Funmilayo Concepcion  : 1970  MRN: [id number]  Today's Date: 11/3/2020      Visit Date: 2020    Visit Dx:    ICD-10-CM ICD-9-CM   1. Orthopedic aftercare  Z47.89 V54.9   2. S/P TKR (total knee replacement), right  Z96.651 V43.65   3. Acute pain of right knee  M25.561 719.46       Patient Active Problem List   Diagnosis   • S/P ACL reconstruction   • Pain and swelling of knee        No past medical history on file.     Past Surgical History:   Procedure Laterality Date   • KNEE ACL RECONSTRUCTION  2016       PT Ortho     Row Name 20 1215       Right Lower Ext    Rt Knee Extension/Flexion AROM  0-4-112 (seated), 0-4-114 (supine)  -JS    Rt Knee Extension/Flexion PROM  117 PROM seated  -JS      User Key  (r) = Recorded By, (t) = Taken By, (c) = Cosigned By    Initials Name Provider Type    Melissa Kuo, PT Physical Therapist                      PT Assessment/Plan     Row Name 20 1215          PT Assessment    Assessment Comments  Pt with continued c/o subjective stiffness.  ROM improving into flexion, remains limited at end-range knee extension. Progression of strengthening. Decreased eccentric quad strength indicated by step downs.All STG's met. LTG #1 revised for progressed ROM. Other LTG's ongoing.  -JS        PT Plan    PT Plan Comments  Continue PT. 30 day reassessment next visit.  -JS       User Key  (r) = Recorded By, (t) = Taken By, (c) = Cosigned By    Initials Name Provider Type    Melissa Kuo, PT Physical Therapist          Modalities     Row Name 20 1215             Ice    Ice Applied  Yes  -JS      Location  R knee in elevation  -JS      Rx Minutes  10 mins  -JS      Ice S/P Rx  Yes  -JS        User Key  (r) = Recorded By, (t) = Taken By, (c) = Cosigned By    Initials Name Provider Type    Melissa Kuo, PT Physical Therapist        OP Exercises     Row Name 20 1217              "Subjective Comments    Subjective Comments  \"It's really stiff\".  -JS         Subjective Pain    Able to rate subjective pain?  yes  -JS      Pre-Treatment Pain Level  5  -JS         Total Minutes    80599 - PT Therapeutic Exercise Minutes  45  -JS         Exercise 1    Exercise Name 1  Nustep  -JS      Cueing 1  Verbal;Demo  -JS      Time 1  5 minutes  -JS      Additional Comments  Level 4  -JS         Exercise 2    Exercise Name 2  long sitting HS stretch  -JS      Reps 2  3  -JS      Time 2  20  -JS         Exercise 3    Exercise Name 3  seated gastroc stretch  -JS      Reps 3  3  -JS      Time 3  20  -JS         Exercise 4    Exercise Name 4  TKE standing with Black TB  -JS      Sets 4  2  -JS      Reps 4  10  -JS      Time 4  5  -JS         Exercise 5    Exercise Name 5  SAQ and LAQ  -JS      Reps 5  20  -JS      Additional Comments  5 #  -JS         Exercise 6    Exercise Name 6  4 inch step ups forward and lateral  -JS      Reps 6  20  -JS         Exercise 7    Exercise Name 7  Heel toe lifts standing  -JS      Cueing 7  Verbal  -JS      Reps 7  15  -JS         Exercise 8    Exercise Name 8  knee flexion stretch at step  -JS      Cueing 8  Verbal  -JS      Reps 8  5  -JS      Time 8  20  -JS         Exercise 10    Exercise Name 10  standing hamstring curl  -JS      Reps 10  10  -JS      Additional Comments  2#  -JS         Exercise 11    Exercise Name 11  prone \"hang\" for ext  -JS      Time 11  5 min  -JS         Exercise 12    Exercise Name 12  4'' step down  -JS      Cueing 12  Verbal  -JS      Reps 12  10  -JS      Additional Comments  eccentric lower; finger tip support on // bars  -JS         Exercise 13    Exercise Name 13  Mini squats  -JS      Cueing 13  Verbal;Demo  -JS      Reps 13  10  -JS         Exercise 14    Exercise Name 14  Standing hip abd  -JS      Cueing 14  Verbal;Demo  -JS      Reps 14  10 B  -JS      Additional Comments  2#  -JS         Exercise 15    Exercise Name 15  Prone knee flex " manual stretch  -JS      Reps 15  3  -JS      Time 15  20 sec  -JS        User Key  (r) = Recorded By, (t) = Taken By, (c) = Cosigned By    Initials Name Provider Type    Melissa Kuo, PT Physical Therapist                       PT OP Goals     Row Name 11/03/20 1215          PT Short Term Goals    STG Date to Achieve  10/23/20  -JS     STG 1  Pt will demonstrate understanding and compliance with initial HEP.  -JS     STG 1 Progress  Met  -JS     STG 2  Pt will demonstrate improved R knee flexion from 94 deg to 100 deg or better.  -JS     STG 2 Progress  Met  -JS     STG 2 Progress Comments  0-4-112 AROM sitting, 0-4-114 supine  -JS     STG 3  Pt will ambulate with appropriate gait mechanics using SC.  -JS     STG 3 Progress  Met  -JS        Long Term Goals    LTG Date to Achieve  11/08/20  -JS     LTG 1  Pt will demonstrate improved AROM of R knee from 8-90 deg to 5-110 deg or better.  -JS     LTG 1 Progress  Met;Goal Revised  -JS     LTG 1 Progress Comments  R knee AROM 0-4-112. New Goal: Improved AROM of R knee to 0-2-120.  -JS     LTG 2  Pt will demonstrate normalized gait mechanics without AD.  -JS     LTG 2 Progress  Ongoing  -JS     LTG 3  Pt will ascend/descend 3 stairs reciprocally using one handrail.  -JS     LTG 3 Progress  Ongoing  -JS       User Key  (r) = Recorded By, (t) = Taken By, (c) = Cosigned By    Initials Name Provider Type    Melissa Kuo PT Physical Therapist          Therapy Education  Education Details: Review HEP.  Added further standing strengthening ex.  Given: HEP  Program: Reinforced, Progressed  How Provided: Verbal, Demonstration  Provided to: Patient  Level of Understanding: Teach back education performed, Verbalized, Demonstrated              Time Calculation:   Start Time: 1215  Stop Time: 1310  Time Calculation (min): 55 min  Therapy Charges for Today     Code Description Service Date Service Provider Modifiers Qty    38853449552 HC PT THER PROC EA 15 MIN 11/3/2020 Alf  Melissa, PT GP 3    [id number]  PT HOT OR COLD PACK TREAT MCARE 11/3/2020 Melissa Milner, PT GP 1                    Melissa Milner, PT  11/3/2020

## 2020-11-05 ENCOUNTER — HOSPITAL ENCOUNTER (OUTPATIENT)
Dept: PHYSICAL THERAPY | Facility: HOSPITAL | Age: 50
Setting detail: THERAPIES SERIES
Discharge: HOME | End: 2020-11-05

## 2020-11-05 DIAGNOSIS — Z96.651 S/P TKR (TOTAL KNEE REPLACEMENT), RIGHT: ICD-10-CM

## 2020-11-05 DIAGNOSIS — Z47.89 ORTHOPEDIC AFTERCARE: Primary | ICD-10-CM

## 2020-11-05 DIAGNOSIS — M25.561 ACUTE PAIN OF RIGHT KNEE: ICD-10-CM

## 2020-11-05 PROCEDURE — 97110 THERAPEUTIC EXERCISES: CPT | Performed by: PHYSICAL THERAPIST

## 2020-11-05 NOTE — THERAPY PROGRESS REPORT/RE-CERT
Outpatient Physical Therapy Ortho Progress Note  [facility]     Patient Name: Funmilayo Concepcion  : 1970  MRN: [id number]  Today's Date: 2020      Visit Date: 2020    Visit Dx:    ICD-10-CM ICD-9-CM   1. Orthopedic aftercare  Z47.89 V54.9   2. S/P TKR (total knee replacement), right  Z96.651 V43.65   3. Acute pain of right knee  M25.561 719.46       Patient Active Problem List   Diagnosis   • S/P ACL reconstruction   • Pain and swelling of knee        No past medical history on file.     Past Surgical History:   Procedure Laterality Date   • KNEE ACL RECONSTRUCTION  2016       PT Ortho     Row Name 20 1212       Subjective Comments    Subjective Comments  C/o stiffness vs. pain  -JS       Subjective Pain    Able to rate subjective pain?  yes  -JS    Pre-Treatment Pain Level  0  -JS       Right Lower Ext    Rt Knee Extension/Flexion AROM  0-4-114 (seated), 0-4-117 (supine)  -JS    Rt Knee Extension/Flexion PROM  118 PROM seated  -JS       MMT (Manual Muscle Testing)    Rt Lower Ext  Rt Knee Extension;Rt Knee Flexion  -JS    Lt Lower Ext  Lt Knee Extension;Lt Knee Flexion  -JS       MMT Right Lower Ext    Rt Knee Extension MMT, Gross Movement  (4+/5) good plus  -JS    Rt Knee Flexion MMT, Gross Movement  (4+/5) good plus  -JS       MMT Left Lower Ext    Lt Knee Extension MMT, Gross Movement  (5/5) normal  -JS    Lt Knee Flexion MMT, Gross Movement  (5/5) normal  -JS       Gait/Stairs (Locomotion)    Handrail Location (Stairs)  right side (ascending)  -JS    Number of Steps (Stairs)  4  -JS    Ascending Technique (Stairs)  step-over-step  -JS    Descending Technique (Stairs)  step-to-step  -JS      User Key  (r) = Recorded By, (t) = Taken By, (c) = Cosigned By    Initials Name Provider Type    Melissa Kuo PT Physical Therapist                      PT Assessment/Plan     Row Name 20 3755          PT Assessment    Assessment Comments  Funmilayo Concepcion has been seen for 9 physical  therapy sessions for R knee s/p TKR on 9/24/2020.  Treatment has included therapeutic exercise, manual therapy, gait training and patient education with home exercise program . Progress to physical therapy goals is good with progression of knee ROM (currently 0-4-114 in sitting) and improved knee strength (4+/5) as well as improved gait pattern with patient now ambulating without assistive device with minimal gait deviations and ascending stairs reciprocally though descending remains with step to pattern . Patient remains off work at this time.  She will benefit from continued skilled physical therapy to address remaining impairments and functional limitations.  -JS     Please refer to paper survey for additional self-reported information  Yes  -JS     Rehab Potential  Good  -JS     Patient/caregiver participated in establishment of treatment plan and goals  Yes  -JS     Patient would benefit from skilled therapy intervention  Yes  -JS        PT Plan    PT Frequency  2x/week  -JS     Predicted Duration of Therapy Intervention (PT)  8-10 visits  -JS     PT Plan Comments  Continue further knee ROM, strengthening, gait training. Ice as indicated for symptom management.  -JS       User Key  (r) = Recorded By, (t) = Taken By, (c) = Cosigned By    Initials Name Provider Type    Melissa Kuo, PT Physical Therapist          Modalities     Row Name 11/05/20 1215             Ice    Ice Applied  Yes  -JS      Location  R knee in elevation  -JS      Rx Minutes  10 mins  -JS      Ice S/P Rx  Yes  -JS      Patient reports will apply ice at home to involved area  Yes  -JS        User Key  (r) = Recorded By, (t) = Taken By, (c) = Cosigned By    Initials Name Provider Type    Melissa Kuo, PT Physical Therapist        OP Exercises     Row Name 11/05/20 8036             Subjective Comments    Subjective Comments  C/o stiffness vs. pain  -JS         Subjective Pain    Able to rate subjective pain?  yes  -JS      Pre-Treatment Pain  "Level  0  -JS         Total Minutes    16926 - PT Therapeutic Exercise Minutes  45  -JS         Exercise 1    Exercise Name 1  Nustep  -JS      Cueing 1  Verbal;Demo  -JS      Time 1  5 minutes  -JS      Additional Comments  Level 5  -JS         Exercise 2    Exercise Name 2  standing HS stretch at step  -JS      Reps 2  3  -JS      Time 2  20  -JS         Exercise 4    Exercise Name 4  TKE standing with Black TB  -JS      Sets 4  2  -JS      Reps 4  10  -JS      Time 4  5  -JS         Exercise 5    Exercise Name 5  SAQ and LAQ  -JS      Reps 5  20  -JS      Additional Comments  5#  -JS         Exercise 6    Exercise Name 6  6\" step up  -JS      Reps 6  20  -JS         Exercise 7    Exercise Name 7  Heel toe lifts standing  -JS      Cueing 7  Verbal  -JS      Reps 7  15  -JS         Exercise 8    Exercise Name 8  knee flexion stretch at step  -JS      Cueing 8  Verbal  -JS      Reps 8  5  -JS      Time 8  20  -JS         Exercise 9    Exercise Name 9  SLS on foam  -JS      Reps 9  3  -JS      Time 9  20  -JS         Exercise 10    Exercise Name 10  standing hamstring curl  -JS      Sets 10  2  -JS      Reps 10  10  -JS      Additional Comments  2#  -JS         Exercise 12    Exercise Name 12  4'' step down  -JS      Cueing 12  Verbal  -JS      Reps 12  2x10  -JS      Additional Comments  eccentric lower; finger tip support on // bars  -JS         Exercise 13    Exercise Name 13  Mini squats  -JS      Cueing 13  Verbal;Demo  -JS      Sets 13  2  -JS      Reps 13  10  -JS         Exercise 14    Exercise Name 14  Standing hip abd  -JS      Cueing 14  Verbal;Demo  -JS      Sets 14  2  -JS      Reps 14  10 B  -JS      Additional Comments  2#  -JS        User Key  (r) = Recorded By, (t) = Taken By, (c) = Cosigned By    Initials Name Provider Type    JS Melissa Milner, PT Physical Therapist                       PT OP Goals     Row Name 11/05/20 1215          PT Short Term Goals    STG Date to Achieve  10/23/20  -JS     STG 1 "  Pt will demonstrate understanding and compliance with initial HEP.  -JS     STG 1 Progress  Met  -JS     STG 2  Pt will demonstrate improved R knee flexion from 94 deg to 100 deg or better.  -JS     STG 2 Progress  Met  -JS     STG 3  Pt will ambulate with appropriate gait mechanics using SC.  -JS     STG 3 Progress  Met  -JS        Long Term Goals    LTG Date to Achieve  11/08/20  -JS     LTG 1  Pt will demonstrate improved AROM of R knee to 0-2-120.  -JS     LTG 1 Progress  Ongoing  -JS     LTG 1 Progress Comments  R knee AROM 0-4-114 in sitting. (0-4-117 in supine following manual stretching)  -JS     LTG 2  Pt will demonstrate normalized gait mechanics without AD.  -JS     LTG 2 Progress  Ongoing;Progressing  -JS     LTG 2 Progress Comments  Mild decreased weight shift to R. No AD with ambulation  -JS     LTG 3  Pt will ascend/descend 3 stairs reciprocally using one handrail.  -JS     LTG 3 Progress  Ongoing  -JS     LTG 3 Progress Comments  ascends reciprocally, descends step to pattern  -       User Key  (r) = Recorded By, (t) = Taken By, (c) = Cosigned By    Initials Name Provider Type    Melissa Kuo PT Physical Therapist               Outcome Measure Options: Knee Outcome Score- ADL  Knee Outcome Score  Knee Outcome Score Comments: 57.5% (46/80)      Time Calculation:   Start Time: 1215  Stop Time: 1310  Time Calculation (min): 55 min  Therapy Charges for Today     Code Description Service Date Service Provider Modifiers Qty    89875341904  PT THER PROC EA 15 MIN 11/5/2020 Melissa Milner, PT GP 3    [id number]  PT HOT OR COLD PACK TREAT MCARE 11/5/2020 Melissa Milner, PT GP 1          PT G-Codes  Outcome Measure Options: Knee Outcome Score- ADL         Melissa Milner PT  11/5/2020

## 2020-11-11 ENCOUNTER — HOSPITAL ENCOUNTER (OUTPATIENT)
Dept: PHYSICAL THERAPY | Facility: HOSPITAL | Age: 50
Setting detail: THERAPIES SERIES
Discharge: HOME | End: 2020-11-11

## 2020-11-11 DIAGNOSIS — Z96.651 S/P TKR (TOTAL KNEE REPLACEMENT), RIGHT: ICD-10-CM

## 2020-11-11 DIAGNOSIS — Z47.89 ORTHOPEDIC AFTERCARE: Primary | ICD-10-CM

## 2020-11-11 DIAGNOSIS — M25.561 ACUTE PAIN OF RIGHT KNEE: ICD-10-CM

## 2020-11-11 PROCEDURE — 97110 THERAPEUTIC EXERCISES: CPT

## 2020-11-11 NOTE — THERAPY TREATMENT NOTE
Outpatient Physical Therapy Ortho Treatment Note  [facility]     Patient Name: Funmilayo Concepcion  : 1970  MRN: [id number]  Today's Date: 2020      Visit Date: 2020    Visit Dx:    ICD-10-CM ICD-9-CM   1. Orthopedic aftercare  Z47.89 V54.9   2. S/P TKR (total knee replacement), right  Z96.651 V43.65   3. Acute pain of right knee  M25.561 719.46       Patient Active Problem List   Diagnosis   • S/P ACL reconstruction   • Pain and swelling of knee        No past medical history on file.     Past Surgical History:   Procedure Laterality Date   • KNEE ACL RECONSTRUCTION  2016                       PT Assessment/Plan     Row Name 20 1109          PT Assessment    Assessment Comments  Patient instructed to strength train TIW only. Stretch daily  -WS       User Key  (r) = Recorded By, (t) = Taken By, (c) = Cosigned By    Initials Name Provider Type    Gilberto Grady PTA Physical Therapy Assistant          Modalities     Row Name 20 1000             Ice    Ice Applied  Yes  -WS      Location  R knee in elevation  -WS      Rx Minutes  10 mins  -WS      Ice S/P Rx  Yes  -WS        User Key  (r) = Recorded By, (t) = Taken By, (c) = Cosigned By    Initials Name Provider Type    Gilberto Grady PTA Physical Therapy Assistant        OP Exercises     Row Name 20 1000             Subjective Comments    Subjective Comments  right knee just stiff  -WS         Subjective Pain    Able to rate subjective pain?  yes  -WS      Pre-Treatment Pain Level  0  -WS         Total Minutes    49892 - PT Therapeutic Exercise Minutes  40  -WS         Exercise 1    Exercise Name 1  Nustep  -WS      Cueing 1  Verbal;Demo  -WS      Time 1  5 minutes  -WS      Additional Comments  L 5  -WS         Exercise 2    Exercise Name 2  standing HS stretch at step  -WS      Reps 2  3  -WS      Time 2  20  -WS         Exercise 4    Exercise Name 4  TKE standing with Black TB  -WS      Sets 4  2  -WS       "Reps 4  10  -WS      Time 4  5  -WS         Exercise 5    Exercise Name 5  SAQ and LAQ  -WS      Reps 5  20  -WS      Additional Comments  7#  -WS         Exercise 6    Exercise Name 6  6\" step up  -WS      Reps 6  20  -WS         Exercise 7    Exercise Name 7  Heel toe lifts standing  -WS      Cueing 7  Verbal  -WS      Reps 7  20  -WS         Exercise 8    Exercise Name 8  knee flexion stretch at step  -WS      Cueing 8  Verbal  -WS      Reps 8  5  -WS      Time 8  20  -WS         Exercise 9    Exercise Name 9  SLS on foam  -WS      Reps 9  3  -WS      Time 9  20  -WS         Exercise 10    Exercise Name 10  standing hamstring curl  -WS      Sets 10  2  -WS      Reps 10  10  -WS      Additional Comments  3#  -WS         Exercise 11    Exercise Name 11  HS stretch on steps  -WS      Reps 11  3  -WS      Time 11  20  -WS         Exercise 12    Exercise Name 12  4'' step down  -WS      Cueing 12  Verbal  -WS      Reps 12  2x10  -WS      Additional Comments  eccentric lowering  -WS         Exercise 13    Exercise Name 13  Mini squats  -WS      Cueing 13  Verbal;Demo  -WS      Sets 13  2  -WS      Reps 13  10  -WS         Exercise 14    Exercise Name 14  Standing hip abd  -WS      Cueing 14  Verbal;Demo  -WS      Sets 14  2  -WS      Reps 14  10 B  -WS      Additional Comments  3#  -WS        User Key  (r) = Recorded By, (t) = Taken By, (c) = Cosigned By    Initials Name Provider Type    Gilberto Grady PTA Physical Therapy Assistant                       PT OP Goals     Row Name 11/11/20 1100          PT Short Term Goals    STG Date to Achieve  10/23/20  -WS     STG 1  Pt will demonstrate understanding and compliance with initial HEP.  -WS     STG 1 Progress  Met  -WS     STG 2  Pt will demonstrate improved R knee flexion from 94 deg to 100 deg or better.  -WS     STG 2 Progress  Met  -WS     STG 3  Pt will ambulate with appropriate gait mechanics using SC.  -WS     STG 3 Progress  Met  -WS        Long Term " Goals    LTG Date to Achieve  11/08/20  -     LTG 1  Pt will demonstrate improved AROM of R knee to 0-2-120.  -     LTG 1 Progress  Ongoing  -     LTG 2  Pt will demonstrate normalized gait mechanics without AD.  -     LTG 2 Progress  Ongoing;Progressing  -     LTG 3  Pt will ascend/descend 3 stairs reciprocally using one handrail.  -     LTG 3 Progress  Ongoing  -       User Key  (r) = Recorded By, (t) = Taken By, (c) = Cosigned By    Initials Name Provider Type    Gilberto Grady PTA Physical Therapy Assistant          Therapy Education  Given: HEP, Posture/body mechanics, Symptoms/condition management  Program: Reinforced  How Provided: Verbal              Time Calculation:   Start Time: 1030  Stop Time: 1120  Time Calculation (min): 50 min  Therapy Charges for Today     Code Description Service Date Service Provider Modifiers Qty    35625072477 HC PT THER PROC EA 15 MIN 11/11/2020 Gilberto Nur PTA GP 3    08593805064 HC PT HOT OR COLD PACK TREAT [facility]ARE 11/11/2020 Gilberto Nur PTA GP 1                    Gilberto Nur PTA  11/11/2020

## 2020-11-13 ENCOUNTER — HOSPITAL ENCOUNTER (OUTPATIENT)
Dept: PHYSICAL THERAPY | Facility: HOSPITAL | Age: 50
Setting detail: THERAPIES SERIES
Discharge: HOME | End: 2020-11-13

## 2020-11-13 DIAGNOSIS — Z96.651 S/P TKR (TOTAL KNEE REPLACEMENT), RIGHT: ICD-10-CM

## 2020-11-13 DIAGNOSIS — Z47.89 ORTHOPEDIC AFTERCARE: Primary | ICD-10-CM

## 2020-11-13 DIAGNOSIS — M25.561 ACUTE PAIN OF RIGHT KNEE: ICD-10-CM

## 2020-11-13 PROCEDURE — 97110 THERAPEUTIC EXERCISES: CPT

## 2020-11-13 NOTE — THERAPY TREATMENT NOTE
"    Outpatient Physical Therapy Ortho Treatment Note  [facility]     Patient Name: Funmilayo Concepcion  : 1970  MRN: [id number]  Today's Date: 2020      Visit Date: 2020    Visit Dx:    ICD-10-CM ICD-9-CM   1. Orthopedic aftercare  Z47.89 V54.9   2. S/P TKR (total knee replacement), right  Z96.651 V43.65   3. Acute pain of right knee  M25.561 719.46       Patient Active Problem List   Diagnosis   • S/P ACL reconstruction   • Pain and swelling of knee        No past medical history on file.     Past Surgical History:   Procedure Laterality Date   • KNEE ACL RECONSTRUCTION  2016                       PT Assessment/Plan     Row Name 20 1107          PT Assessment    Assessment Comments  Patient ambulating with no AD with small limp. Tolerated progression of resistance. Strength train TIW. Stretch daily  -WS       User Key  (r) = Recorded By, (t) = Taken By, (c) = Cosigned By    Initials Name Provider Type    Gilberto Grady PTA Physical Therapy Assistant            OP Exercises     Row Name 20 1030             Subjective Comments    Subjective Comments  knee real stiff  -WS         Subjective Pain    Able to rate subjective pain?  yes  -WS      Pre-Treatment Pain Level  3  -WS         Exercise 1    Exercise Name 1  Nustep  -      Cueing 1  Verbal;Demo  -WS      Time 1  5 minutes  -WS         Exercise 2    Exercise Name 2  standing HS stretch at step  -WS      Reps 2  3  -WS      Time 2  20  -WS         Exercise 3    Exercise Name 3  bike seat 4  -WS      Time 3  5 min  -WS         Exercise 4    Exercise Name 4  TKE standing with Black TB  -WS      Sets 4  2  -WS      Reps 4  10  -WS      Time 4  5  -WS         Exercise 5    Exercise Name 5  SAQ and LAQ  -WS      Reps 5  20  -WS      Additional Comments  7#  -WS         Exercise 6    Exercise Name 6  6\" step up  -WS      Reps 6  20  -WS         Exercise 7    Exercise Name 7  Heel toe lifts standing  -WS      Cueing 7  Verbal  -WS  "     Reps 7  20  -WS         Exercise 8    Exercise Name 8  knee flexion stretch at step  -WS      Cueing 8  Verbal  -WS      Reps 8  5  -WS      Time 8  20  -WS         Exercise 9    Exercise Name 9  SLS on foam  -WS      Reps 9  3  -WS      Time 9  20  -WS         Exercise 10    Exercise Name 10  standing hamstring curl  -WS      Sets 10  2  -WS      Reps 10  10  -WS      Additional Comments  4#  -WS         Exercise 11    Exercise Name 11  HS stretch on steps  -WS      Reps 11  3  -WS      Time 11  20  -WS         Exercise 12    Exercise Name 12  4'' step down  -WS      Cueing 12  Verbal  -WS      Reps 12  --  -WS         Exercise 13    Exercise Name 13  Mini squats  -WS      Cueing 13  Verbal;Demo  -WS      Sets 13  2  -WS      Reps 13  10  -WS         Exercise 14    Exercise Name 14  Standing hip abd  -WS      Cueing 14  Verbal;Demo  -WS      Sets 14  2  -WS      Reps 14  10 B  -WS      Additional Comments  4#  -WS        User Key  (r) = Recorded By, (t) = Taken By, (c) = Cosigned By    Initials Name Provider Type    Gilberto Grady PTA Physical Therapy Assistant                       PT OP Goals     Row Name 11/13/20 1100          PT Short Term Goals    STG Date to Achieve  10/23/20  -     STG 1  Pt will demonstrate understanding and compliance with initial HEP.  -     STG 1 Progress  Met  -     STG 2  Pt will demonstrate improved R knee flexion from 94 deg to 100 deg or better.  -     STG 2 Progress  Met  -     STG 3  Pt will ambulate with appropriate gait mechanics using SC.  -     STG 3 Progress  Met  -        Long Term Goals    LTG Date to Achieve  11/08/20  -     LTG 1  Pt will demonstrate improved AROM of R knee to 0-2-120.  -     LTG 1 Progress  Ongoing  -     LTG 2  Pt will demonstrate normalized gait mechanics without AD.  -     LTG 2 Progress  Ongoing;Progressing  -     LTG 3  Pt will ascend/descend 3 stairs reciprocally using one handrail.  -     LTG 3 Progress   Ongoing  -       User Key  (r) = Recorded By, (t) = Taken By, (c) = Cosigned By    Initials Name Provider Type    Gilberto Grady PTA Physical Therapy Assistant          Therapy Education  Given: HEP, Symptoms/condition management, Pain management, Posture/body mechanics, Edema management  Program: Reinforced  How Provided: Verbal  Provided to: Patient  Level of Understanding: Teach back education performed              Time Calculation:   Start Time: 1030  Stop Time: 1110  Time Calculation (min): 40 min  Therapy Charges for Today     Code Description Service Date Service Provider Modifiers Qty    98158219877 HC PT THER PROC EA 15 MIN 11/13/2020 Gilberto Nur PTA GP 3                    Gilberto Nur PTA  11/13/2020

## 2020-11-16 ENCOUNTER — HOSPITAL ENCOUNTER (OUTPATIENT)
Dept: PHYSICAL THERAPY | Facility: HOSPITAL | Age: 50
Setting detail: THERAPIES SERIES
Discharge: HOME | End: 2020-11-16

## 2020-11-16 DIAGNOSIS — M25.561 ACUTE PAIN OF RIGHT KNEE: ICD-10-CM

## 2020-11-16 DIAGNOSIS — Z47.89 ORTHOPEDIC AFTERCARE: ICD-10-CM

## 2020-11-16 DIAGNOSIS — Z96.651 S/P TKR (TOTAL KNEE REPLACEMENT), RIGHT: Primary | ICD-10-CM

## 2020-11-16 PROCEDURE — 97110 THERAPEUTIC EXERCISES: CPT

## 2020-11-16 NOTE — THERAPY TREATMENT NOTE
Outpatient Physical Therapy Ortho Treatment Note  [facility]     Patient Name: Funmilayo Concepcion  : 1970  MRN: [id number]  Today's Date: 2020      Visit Date: 2020    Visit Dx:    ICD-10-CM ICD-9-CM   1. S/P TKR (total knee replacement), right  Z96.651 V43.65   2. Orthopedic aftercare  Z47.89 V54.9   3. Acute pain of right knee  M25.561 719.46       Patient Active Problem List   Diagnosis   • S/P ACL reconstruction   • Pain and swelling of knee        No past medical history on file.     Past Surgical History:   Procedure Laterality Date   • KNEE ACL RECONSTRUCTION  2016                       PT Assessment/Plan     Row Name 20 1519          PT Assessment    Assessment Comments  Pt. presents to clinic with reports of slight stiffness in knee as she has been resting over the weekend. Tolerated session well with addition of leg press (bi and uni) and lateral stepping to target glute med as pt. demonstrates pelvic drop with SLS and single limb exercises.  -        PT Plan    PT Plan Comments  progress as able; consider adding BOSU to squat, SL RDL? work on gait  -       User Key  (r) = Recorded By, (t) = Taken By, (c) = Cosigned By    Initials Name Provider Type     Funmilayo Yang, PT Physical Therapist            OP Exercises     Row Name 20 1400             Subjective Comments    Subjective Comments  Feels stiff because I didnt do anything over the weekend  -         Subjective Pain    Able to rate subjective pain?  yes  -      Pre-Treatment Pain Level  5  -         Total Minutes    09286 - PT Therapeutic Exercise Minutes  40  -         Exercise 1    Exercise Name 1  Nustep  -      Cueing 1  Verbal;Demo  -      Time 1  5 minutes  -         Exercise 2    Exercise Name 2  standing HS stretch at step  -      Reps 2  3  -      Time 2  20  -MH         Exercise 3    Exercise Name 3  bike seat 4  -      Time 3  5 min  -         Exercise 4    Exercise Name 4   "TKE standing with Black TB  -MH      Sets 4  2  -MH      Reps 4  10  -MH      Time 4  5  -MH         Exercise 5    Exercise Name 5  LAQ  -MH      Reps 5  20  -MH      Additional Comments  7#  -MH         Exercise 6    Exercise Name 6  8\" step up  -MH      Reps 6  20  -MH         Exercise 7    Exercise Name 7  Heel toe lifts standing  -MH      Cueing 7  Verbal  -MH      Reps 7  20  -MH         Exercise 8    Exercise Name 8  knee flexion stretch at step  -MH      Cueing 8  Verbal  -MH      Reps 8  5  -MH      Time 8  20  -MH         Exercise 9    Exercise Name 9  SLS on foam  -MH      Reps 9  3  -MH      Time 9  20  -MH         Exercise 10    Exercise Name 10  standing hamstring curl  -MH      Sets 10  2  -MH      Reps 10  10  -MH         Exercise 11    Exercise Name 11  HS stretch on steps  -MH      Reps 11  3  -MH      Time 11  20  -MH         Exercise 12    Exercise Name 12  4'' step down  -MH      Cueing 12  Verbal  -MH         Exercise 13    Exercise Name 13  Mini squats  -MH      Cueing 13  Verbal;Demo  -MH      Sets 13  2  -MH      Reps 13  10  -MH         Exercise 14    Exercise Name 14  Standing hip abd  -MH      Cueing 14  Verbal;Demo  -MH      Sets 14  2  -MH      Reps 14  10 B  -MH      Additional Comments  4#  -MH         Exercise 15    Exercise Name 15  leg press  -MH      Cueing 15  Verbal  -MH      Sets 15  2  -MH      Reps 15  10  -MH      Additional Comments  bi and uni (160 bi; 80/100 uni)  -MH         Exercise 16    Exercise Name 16  lateral stepping  -MH      Cueing 16  Verbal  -MH      Reps 16  3 laps  -MH      Additional Comments  RTB  -MH        User Key  (r) = Recorded By, (t) = Taken By, (c) = Cosigned By    Initials Name Provider Type    Funmilayo Huff, PT Physical Therapist                       PT OP Goals     Row Name 11/16/20 1400          PT Short Term Goals    STG Date to Achieve  10/23/20  -MH     STG 1  Pt will demonstrate understanding and compliance with initial HEP.  -MH     " STG 1 Progress  Met  -     STG 2  Pt will demonstrate improved R knee flexion from 94 deg to 100 deg or better.  -     STG 2 Progress  Met  -     STG 3  Pt will ambulate with appropriate gait mechanics using SC.  -     STG 3 Progress  Met  [facility]        Long Term Goals    LTG Date to Achieve  11/08/20  -     LTG 1  Pt will demonstrate improved AROM of R knee to 0-2-120.  -     LTG 1 Progress  Ongoing  -     LTG 2  Pt will demonstrate normalized gait mechanics without AD.  -     LTG 2 Progress  Ongoing;Progressing  -     LTG 3  Pt will ascend/descend 3 stairs reciprocally using one handrail.  -     LTG 3 Progress  Ongoing  -       User Key  (r) = Recorded By, (t) = Taken By, (c) = Cosigned By    Initials Name Provider Type     Funmilayo Yang PT Physical Therapist          Therapy Education  Given: Symptoms/condition management, Pain management, Posture/body mechanics, Edema management  Program: Reinforced  How Provided: Verbal  Provided to: Patient  Level of Understanding: Teach back education performed, Verbalized, Demonstrated              Time Calculation:   Start Time: 1435  Stop Time: 1515  Time Calculation (min): 40 min  Total Timed Code Minutes- PT: 40 minute(s)  Therapy Charges for Today     Code Description Service Date Service Provider Modifiers Qty    64893804138 HC PT THER PROC EA 15 MIN 11/16/2020 Funmilayo Yang, SHASHANK GP 3                    Funmilayo Yang PT  11/16/2020

## 2020-11-19 ENCOUNTER — HOSPITAL ENCOUNTER (OUTPATIENT)
Dept: PHYSICAL THERAPY | Facility: HOSPITAL | Age: 50
Setting detail: THERAPIES SERIES
Discharge: HOME | End: 2020-11-19

## 2020-11-19 DIAGNOSIS — M25.561 ACUTE PAIN OF RIGHT KNEE: ICD-10-CM

## 2020-11-19 DIAGNOSIS — Z47.89 ORTHOPEDIC AFTERCARE: ICD-10-CM

## 2020-11-19 DIAGNOSIS — Z96.651 S/P TKR (TOTAL KNEE REPLACEMENT), RIGHT: Primary | ICD-10-CM

## 2020-11-19 PROCEDURE — 97112 NEUROMUSCULAR REEDUCATION: CPT

## 2020-11-19 PROCEDURE — 97110 THERAPEUTIC EXERCISES: CPT

## 2020-11-19 NOTE — THERAPY TREATMENT NOTE
Outpatient Physical Therapy Ortho Treatment Note  [facility]     Patient Name: Funmilayo Concepcion  : 1970  MRN: [id number]  Today's Date: 2020      Visit Date: 2020    Visit Dx:    ICD-10-CM ICD-9-CM   1. S/P TKR (total knee replacement), right  Z96.651 V43.65   2. Orthopedic aftercare  Z47.89 V54.9   3. Acute pain of right knee  M25.561 719.46       Patient Active Problem List   Diagnosis   • S/P ACL reconstruction   • Pain and swelling of knee        No past medical history on file.     Past Surgical History:   Procedure Laterality Date   • KNEE ACL RECONSTRUCTION  2016                       PT Assessment/Plan     Row Name 20 1528          PT Assessment    Assessment Comments  Pt. continues to progress with strengthening; progressed several exercises with addition of unstable surace to improve proprioception and dynamic stability. Pt. continues with slight antalgic gait pattern but improves with cues.  -        PT Plan    PT Plan Comments  Continue to progress; consider  RD?  -       User Key  (r) = Recorded By, (t) = Taken By, (c) = Cosigned By    Initials Name Provider Type     TreyFunmilayo, PT Physical Therapist            OP Exercises     Row Name 20 1400             Subjective Comments    Subjective Comments  Doing good, no complaints  -         Subjective Pain    Able to rate subjective pain?  yes  -      Pre-Treatment Pain Level  4  -         Total Minutes    11044 - PT Therapeutic Exercise Minutes  32  -      96062 -  PT Neuromuscular Reeducation Minutes  10  -MH         Exercise 1    Exercise Name 1  Nustep  -      Cueing 1  Verbal;Demo  -      Time 1  5 minutes  -         Exercise 2    Exercise Name 2  standing HS stretch at step  -      Reps 2  3  -      Time 2  20  -MH         Exercise 3    Exercise Name 3  bike seat 4  -      Time 3  5 min  -         Exercise 4    Exercise Name 4  TKE standing with Black TB  -      Sets 4  2  -  "     Reps 4  10  -MH      Time 4  5  -MH         Exercise 5    Exercise Name 5  --  -MH      Reps 5  --  -MH         Exercise 6    Exercise Name 6  8\" step up  -MH      Reps 6  20  -MH      Additional Comments  with foam  -MH         Exercise 7    Exercise Name 7  Heel toe lifts standing  -MH      Cueing 7  Verbal  -MH      Reps 7  20  -MH         Exercise 8    Exercise Name 8  --  -MH      Cueing 8  --  -MH      Reps 8  --  -MH      Time 8  --  -MH         Exercise 9    Exercise Name 9  SLS on foam  -MH      Reps 9  3  -MH      Time 9  20  -MH         Exercise 10    Exercise Name 10  standing hamstring curl  -MH      Sets 10  2  -MH      Reps 10  10  -MH      Additional Comments  5#  -MH         Exercise 11    Exercise Name 11  HS stretch on steps  -MH      Reps 11  3  -MH      Time 11  20  -MH         Exercise 12    Exercise Name 12  4'' step down  -MH      Cueing 12  Verbal  -MH         Exercise 13    Exercise Name 13  Mini squats  -MH      Cueing 13  Verbal;Demo  -MH      Sets 13  2  -MH      Reps 13  10  -MH      Additional Comments  on BOSU  -MH         Exercise 14    Exercise Name 14  Standing hip abd  -MH      Cueing 14  Verbal;Demo  -MH      Sets 14  2  -MH      Reps 14  10 B  -MH      Additional Comments  5#  -MH         Exercise 15    Exercise Name 15  leg press  -MH      Cueing 15  Verbal  -MH      Sets 15  2  -MH      Reps 15  10  -MH      Additional Comments  bi 160# uni 80/90#  -MH         Exercise 16    Exercise Name 16  lateral stepping  -MH      Cueing 16  Verbal  -MH      Reps 16  3 laps  -MH      Additional Comments  GTB- band at ankles  -MH         Exercise 17    Exercise Name 17  monster walk  -MH      Cueing 17  Verbal  -MH      Reps 17  3 laps  -MH      Additional Comments  GTB - band at ankles  -MH         Exercise 18    Exercise Name 18  SLS on foam with trunk rotation  -MH      Cueing 18  Verbal  -MH      Sets 18  3  -MH      Reps 18  10e  -MH        User Key  (r) = Recorded By, (t) = " Taken By, (c) = Cosigned By    Initials Name Provider Type     Funmilayo Yang, PT Physical Therapist                       PT OP Goals     Row Name 11/19/20 1400          PT Short Term Goals    STG Date to Achieve  10/23/20  -     STG 1  Pt will demonstrate understanding and compliance with initial HEP.  -     STG 1 Progress  Met  -     STG 2  Pt will demonstrate improved R knee flexion from 94 deg to 100 deg or better.  -     STG 2 Progress  Met  -     STG 3  Pt will ambulate with appropriate gait mechanics using SC.  -     STG 3 Progress  Met  -        Long Term Goals    LTG Date to Achieve  11/08/20  -     LTG 1  Pt will demonstrate improved AROM of R knee to 0-2-120.  -     LTG 1 Progress  Ongoing  -     LTG 2  Pt will demonstrate normalized gait mechanics without AD.  -     LTG 2 Progress  Ongoing;Progressing  -     LTG 3  Pt will ascend/descend 3 stairs reciprocally using one handrail.  -     LTG 3 Progress  Ongoing  -       User Key  (r) = Recorded By, (t) = Taken By, (c) = Cosigned By    Initials Name Provider Type     Funmilayo Yang, PT Physical Therapist                         Time Calculation:   Start Time: 1446  Stop Time: 1528  Time Calculation (min): 42 min  Total Timed Code Minutes- PT: 42 minute(s)  Therapy Charges for Today     Code Description Service Date Service Provider Modifiers Qty    49692710764 HC PT THER PROC EA 15 MIN 11/19/2020 Funmilayo Yang, PT GP 2    48481546880 HC PT NEUROMUSC RE EDUCATION EA 15 MIN 11/19/2020 Funmilayo Yang, SHASHANK GP 1                    Funmilayo Yang PT  11/19/2020

## 2020-11-23 ENCOUNTER — HOSPITAL ENCOUNTER (OUTPATIENT)
Dept: PHYSICAL THERAPY | Facility: HOSPITAL | Age: 50
Setting detail: THERAPIES SERIES
Discharge: HOME | End: 2020-11-23

## 2020-11-23 DIAGNOSIS — Z47.89 ORTHOPEDIC AFTERCARE: ICD-10-CM

## 2020-11-23 DIAGNOSIS — M25.561 ACUTE PAIN OF RIGHT KNEE: ICD-10-CM

## 2020-11-23 DIAGNOSIS — Z96.651 S/P TKR (TOTAL KNEE REPLACEMENT), RIGHT: Primary | ICD-10-CM

## 2020-11-23 PROCEDURE — 97112 NEUROMUSCULAR REEDUCATION: CPT

## 2020-11-23 PROCEDURE — 97110 THERAPEUTIC EXERCISES: CPT

## 2020-11-23 NOTE — THERAPY TREATMENT NOTE
"    Outpatient Physical Therapy Ortho Treatment Note  [facility]     Patient Name: Funmilayo Concepcion  : 1970  MRN: [id number]  Today's Date: 2020      Visit Date: 2020    Visit Dx:    ICD-10-CM ICD-9-CM   1. S/P TKR (total knee replacement), right  Z96.651 V43.65   2. Orthopedic aftercare  Z47.89 V54.9   3. Acute pain of right knee  M25.561 719.46       Patient Active Problem List   Diagnosis   • S/P ACL reconstruction   • Pain and swelling of knee        No past medical history on file.     Past Surgical History:   Procedure Laterality Date   • KNEE ACL RECONSTRUCTION  2016                       PT Assessment/Plan     Row Name 20 1241          PT Assessment    Assessment Comments  Continue to progress strengthening. Ice massage pe-ansrine after exercises  -       User Key  (r) = Recorded By, (t) = Taken By, (c) = Cosigned By    Initials Name Provider Type    Gilberto Grady PTA Physical Therapy Assistant            OP Exercises     Row Name 20 1200             Subjective Comments    Subjective Comments  see the MD today  -         Subjective Pain    Able to rate subjective pain?  yes  -WS      Pre-Treatment Pain Level  2  -WS         Total Minutes    84681 - PT Therapeutic Exercise Minutes  32  -WS      93099 -  PT Neuromuscular Reeducation Minutes  10  -WS         Exercise 1    Exercise Name 1  Nustep  -      Cueing 1  Verbal;Demo  -WS      Time 1  5 minutes  -WS         Exercise 2    Exercise Name 2  standing HS stretch at step  -      Reps 2  3  -WS      Time 2  20  -WS         Exercise 3    Exercise Name 3  bike seat 4  -WS      Time 3  5 min  -WS         Exercise 4    Exercise Name 4  TKE standing with Black TB  -WS      Sets 4  2  -WS      Reps 4  10  -WS      Time 4  5  -WS         Exercise 6    Exercise Name 6  8\" step up  -WS      Reps 6  20  -WS      Additional Comments  with foam  -WS         Exercise 7    Exercise Name 7  Heel toe lifts standing  -WS      " Cueing 7  Verbal  -WS      Reps 7  20  -WS         Exercise 9    Exercise Name 9  SLS on foam  -WS      Reps 9  3  -WS      Time 9  20  -WS         Exercise 10    Exercise Name 10  standing hamstring curl  -WS      Sets 10  2  -WS      Reps 10  10  -WS      Additional Comments  5#  -WS         Exercise 11    Exercise Name 11  HS stretch on steps  -WS      Reps 11  3  -WS      Time 11  20  -WS         Exercise 12    Exercise Name 12  4'' step down  -WS      Cueing 12  Verbal  -WS         Exercise 13    Exercise Name 13  Mini squats  -WS      Cueing 13  Verbal;Demo  -WS      Sets 13  2  -WS      Reps 13  10  -WS      Additional Comments  BOSU  -WS         Exercise 14    Exercise Name 14  Standing hip abd  -WS      Cueing 14  Verbal;Demo  -WS      Sets 14  2  -WS      Reps 14  10 B  -WS      Additional Comments  5#  -WS         Exercise 15    Exercise Name 15  leg press  -WS      Cueing 15  Verbal  -WS      Sets 15  2  -WS      Reps 15  10  -WS      Additional Comments  philip 180#, uni 100  -WS         Exercise 16    Exercise Name 16  lateral stepping  -WS      Cueing 16  Verbal  -WS      Reps 16  3 laps  -WS      Additional Comments  GTB ankles  -WS         Exercise 17    Exercise Name 17  monster walk  -WS      Cueing 17  Verbal  -WS      Reps 17  3 laps  -WS      Additional Comments  GTB ankles  -WS         Exercise 18    Exercise Name 18  SLS on foam with trunk rotation  -WS      Cueing 18  Verbal  -WS      Sets 18  3  -WS      Reps 18  10e  -WS        User Key  (r) = Recorded By, (t) = Taken By, (c) = Cosigned By    Initials Name Provider Type    WS Gilberto Nur PTA Physical Therapy Assistant                       PT OP Goals     Row Name 11/23/20 1200          PT Short Term Goals    STG Date to Achieve  10/23/20  -WS     STG 1  Pt will demonstrate understanding and compliance with initial HEP.  -WS     STG 1 Progress  Met  -WS     STG 2  Pt will demonstrate improved R knee flexion from 94 deg to 100 deg or  better.  -     STG 2 Progress  Met  -     STG 3  Pt will ambulate with appropriate gait mechanics using SC.  -     STG 3 Progress  Met  -        Long Term Goals    LTG Date to Achieve  11/08/20  -WS     LTG 1  Pt will demonstrate improved AROM of R knee to 0-2-120.  -WS     LTG 1 Progress  Ongoing  -WS     LTG 2  Pt will demonstrate normalized gait mechanics without AD.  -WS     LTG 2 Progress  Ongoing;Progressing  -     LTG 3  Pt will ascend/descend 3 stairs reciprocally using one handrail.  -     LTG 3 Progress  Ongoing  -       User Key  (r) = Recorded By, (t) = Taken By, (c) = Cosigned By    Initials Name Provider Type    Gilberto Grady PTA Physical Therapy Assistant          Therapy Education  Given: HEP, Symptoms/condition management, Pain management, Posture/body mechanics  Program: Reinforced  How Provided: Verbal, Demonstration  Provided to: Patient  Level of Understanding: Teach back education performed              Time Calculation:   Start Time: 1200  Stop Time: 1242  Time Calculation (min): 42 min  Therapy Charges for Today     Code Description Service Date Service Provider Modifiers Qty    01073278048  PT NEUROMUSC RE EDUCATION EA 15 MIN 11/23/2020 Gilberto Nur PTA GP 1    [id number]  PT THER PROC EA 15 MIN 11/23/2020 Gilberto Nur PTA GP 2                    Gilberto Nur PTA  11/23/2020

## 2020-11-25 ENCOUNTER — HOSPITAL ENCOUNTER (OUTPATIENT)
Dept: PHYSICAL THERAPY | Facility: HOSPITAL | Age: 50
Setting detail: THERAPIES SERIES
Discharge: HOME | End: 2020-11-25

## 2020-11-25 DIAGNOSIS — Z96.651 S/P TKR (TOTAL KNEE REPLACEMENT), RIGHT: Primary | ICD-10-CM

## 2020-11-25 DIAGNOSIS — M25.561 ACUTE PAIN OF RIGHT KNEE: ICD-10-CM

## 2020-11-25 DIAGNOSIS — Z47.89 ORTHOPEDIC AFTERCARE: ICD-10-CM

## 2020-11-25 PROCEDURE — 97140 MANUAL THERAPY 1/> REGIONS: CPT

## 2020-11-25 PROCEDURE — 97110 THERAPEUTIC EXERCISES: CPT

## 2020-11-25 NOTE — THERAPY TREATMENT NOTE
Outpatient Physical Therapy Ortho Treatment Note  [facility]     Patient Name: Funmilayo Concepcion  : 1970  MRN: [id number]  Today's Date: 2020      Visit Date: 2020    Visit Dx:    ICD-10-CM ICD-9-CM   1. S/P TKR (total knee replacement), right  Z96.651 V43.65   2. Orthopedic aftercare  Z47.89 V54.9   3. Acute pain of right knee  M25.561 719.46       Patient Active Problem List   Diagnosis   • S/P ACL reconstruction   • Pain and swelling of knee        No past medical history on file.     Past Surgical History:   Procedure Laterality Date   • KNEE ACL RECONSTRUCTION  2016                       PT Assessment/Plan     Row Name 20 1238          PT Assessment    Assessment Comments  Pt. reports some improvement followin ice massage and states has been performing at home. Continued with current POC; progressed current exercises with increased weight. Pt. demonstrating improved balance with SLS + rotation on foam. Pt. fatigues with step ups and lateral stepping/monster walk. Performed STM to quad/VMO and pes anserine at end of session with pt. very tender and taut. Instructed pt. to perform self-massage at home to reduce stiffness.  -        PT Plan    PT Plan Comments  Continue to progress strengthening; assess response to manual  -       User Key  (r) = Recorded By, (t) = Taken By, (c) = Cosigned By    Initials Name Provider Type     TreyFunmilayo, PT Physical Therapist            OP Exercises     Row Name 20 1100             Subjective Comments    Subjective Comments  It is just so stiff  -         Subjective Pain    Able to rate subjective pain?  yes  -      Pre-Treatment Pain Level  2  -         Total Minutes    79760 - PT Therapeutic Exercise Minutes  36  -      97339 - PT Manual Therapy Minutes  8  -         Exercise 1    Exercise Name 1  Nustep  -      Cueing 1  Verbal;Demo  -      Time 1  5 minutes  -         Exercise 2    Exercise Name 2  --  -       "Reps 2  --  -MH      Time 2  --  -MH         Exercise 3    Exercise Name 3  bike seat 4  -MH      Time 3  5 min  -MH         Exercise 4    Exercise Name 4  --  -MH      Sets 4  --  -MH      Reps 4  --  -MH      Time 4  --  -MH         Exercise 6    Exercise Name 6  8\" step up  -MH      Reps 6  20  -MH      Additional Comments  with foam  -MH         Exercise 7    Exercise Name 7  Heel toe lifts standing  -MH      Cueing 7  Verbal  -MH      Reps 7  20  -MH         Exercise 9    Exercise Name 9  --  -MH      Reps 9  --  -MH      Time 9  --  -MH         Exercise 10    Exercise Name 10  standing hamstring curl  -MH      Sets 10  --  -MH      Reps 10  --  -MH      Additional Comments  held for time  -MH         Exercise 11    Exercise Name 11  HS stretch on steps  -MH      Reps 11  3  -MH      Time 11  20  -MH         Exercise 12    Exercise Name 12  4'' step down  -MH      Cueing 12  Verbal  -MH         Exercise 13    Exercise Name 13  Mini squats  -MH      Cueing 13  Verbal;Demo  -MH      Sets 13  2  -MH      Reps 13  10  -MH      Additional Comments  on BOSU  -MH         Exercise 14    Exercise Name 14  Standing hip abd  -MH      Cueing 14  --  -MH      Sets 14  --  -MH      Reps 14  --  -MH      Additional Comments  held for time  -MH         Exercise 15    Exercise Name 15  leg press  -MH      Cueing 15  Verbal  -MH      Sets 15  2  -MH      Reps 15  10  -MH      Additional Comments  180#/190# bi; 100## uni  -MH         Exercise 16    Exercise Name 16  lateral stepping  -MH      Cueing 16  Verbal  -MH      Reps 16  3 laps  -MH      Additional Comments  GTB at ankles  -MH         Exercise 17    Exercise Name 17  monster walk  -MH      Cueing 17  Verbal  -MH      Reps 17  3 laps  -MH      Additional Comments  GTB at ankles  -MH         Exercise 18    Exercise Name 18  SLS on foam with trunk rotation  -MH      Cueing 18  Verbal  -MH      Sets 18  3  -MH      Reps 18  10e  -MH      Additional Comments  w/ L2 med ball  " -        User Key  (r) = Recorded By, (t) = Taken By, (c) = Cosigned By    Initials Name Provider Type     Funmilayo Yang, PT Physical Therapist                      Manual Rx (last 36 hours)      Manual Treatments     Row Name 11/25/20 1100             Total Minutes    74780 - PT Manual Therapy Minutes  8  -         Manual Rx 1    Manual Rx 1 Location   R knee quad/VMO cross fiber STM  -      Manual Rx 1 Type  pes anserine   -      Manual Rx 1 Grade  instructued in self massage  -        User Key  (r) = Recorded By, (t) = Taken By, (c) = Cosigned By    Initials Name Provider Type     Funmilayo Yang, PT Physical Therapist          PT OP Goals     Row Name 11/25/20 1100          PT Short Term Goals    STG Date to Achieve  10/23/20  -     STG 1  Pt will demonstrate understanding and compliance with initial HEP.  -     STG 1 Progress  Met  -     STG 2  Pt will demonstrate improved R knee flexion from 94 deg to 100 deg or better.  -     STG 2 Progress  Met  -     STG 3  Pt will ambulate with appropriate gait mechanics using SC.  -     STG 3 Progress  Met  [facility]        Long Term Goals    LTG Date to Achieve  11/08/20  -     LTG 1  Pt will demonstrate improved AROM of R knee to 0-2-120.  -     LTG 1 Progress  Ongoing  -     LTG 2  Pt will demonstrate normalized gait mechanics without AD.  -     LTG 2 Progress  Ongoing;Progressing  -     LTG 3  Pt will ascend/descend 3 stairs reciprocally using one handrail.  -     LTG 3 Progress  Ongoing  -       User Key  (r) = Recorded By, (t) = Taken By, (c) = Cosigned By    Initials Name Provider Type     Funmilayo Yang, PT Physical Therapist          Therapy Education  Education Details: self massage at home and continue ice massage  Given: Symptoms/condition management, Pain management, Posture/body mechanics  Program: Reinforced  How Provided: Verbal, Demonstration  Provided to: Patient  Level of Understanding: Teach back education  performed              Time Calculation:   Start Time: 1148  Stop Time: 1232  Time Calculation (min): 44 min  Total Timed Code Minutes- PT: 44 minute(s)  Therapy Charges for Today     Code Description Service Date Service Provider Modifiers Qty    27102379665  PT MANUAL THERAPY EA 15 MIN 11/25/2020 Funmilayo Yang, PT GP 1    77751377937 HC PT THER PROC EA 15 MIN 11/25/2020 Funmilayo Yang, PT GP 2                    Funmilayo Yang PT  11/25/2020

## 2020-12-01 ENCOUNTER — HOSPITAL ENCOUNTER (OUTPATIENT)
Dept: PHYSICAL THERAPY | Facility: HOSPITAL | Age: 50
Setting detail: THERAPIES SERIES
Discharge: HOME | End: 2020-12-01

## 2020-12-01 DIAGNOSIS — M25.561 ACUTE PAIN OF RIGHT KNEE: ICD-10-CM

## 2020-12-01 DIAGNOSIS — Z96.651 S/P TKR (TOTAL KNEE REPLACEMENT), RIGHT: Primary | ICD-10-CM

## 2020-12-01 DIAGNOSIS — Z47.89 ORTHOPEDIC AFTERCARE: ICD-10-CM

## 2020-12-01 PROCEDURE — 97110 THERAPEUTIC EXERCISES: CPT

## 2020-12-01 PROCEDURE — 97140 MANUAL THERAPY 1/> REGIONS: CPT

## 2020-12-01 NOTE — THERAPY TREATMENT NOTE
Outpatient Physical Therapy Ortho Treatment Note  [facility]     Patient Name: Funmilayo Concepcion  : 1970  MRN: [id number]  Today's Date: 2020      Visit Date: 2020    Visit Dx:    ICD-10-CM ICD-9-CM   1. S/P TKR (total knee replacement), right  Z96.651 V43.65   2. Orthopedic aftercare  Z47.89 V54.9   3. Acute pain of right knee  M25.561 719.46       Patient Active Problem List   Diagnosis   • S/P ACL reconstruction   • Pain and swelling of knee        No past medical history on file.     Past Surgical History:   Procedure Laterality Date   • KNEE ACL RECONSTRUCTION  2016                       PT Assessment/Plan     Row Name 20 1402          PT Assessment    Assessment Comments  Pt. presents to therapy with reprots of increased pain and soreness this date as today was first day back to work. Pt. states she has been up and down all day and wearing work shoes. Due to return to work and increased soreness, held on progression of ther ex this date to allow for tissue healing and reduce pain levels. Instructed pt. to hold on exercises for 1 day and then continue strengthening 3x/week. Performed STM to quad/VMO and hamstrings as well as mobs to improve knee extension as pt. still lacks 4 degrees from full extension.  -        PT Plan    PT Plan Comments  Finalizze HEP over coming sessions to prepare for D/C.  -       User Key  (r) = Recorded By, (t) = Taken By, (c) = Cosigned By    Initials Name Provider Type     Funmilayo Yang, PT Physical Therapist          Modalities     Row Name 20 1300             Ice    Patient reports will apply ice at home to involved area  Yes  -        User Key  (r) = Recorded By, (t) = Taken By, (c) = Cosigned By    Initials Name Provider Type     Funmilayo Yang, PT Physical Therapist        OP Exercises     Row Name 20 1300             Subjective Comments    Subjective Comments  It is hurting today. I went back to work and have been in my  "work shoes and getting up and down on concrete floor.  -MH         Subjective Pain    Able to rate subjective pain?  yes  -MH      Pre-Treatment Pain Level  6  -MH         Total Minutes    05687 - PT Therapeutic Exercise Minutes  26  -MH      59984 - PT Manual Therapy Minutes  14  -MH         Exercise 1    Exercise Name 1  Nustep  -      Cueing 1  Verbal;Demo  -      Time 1  5 minutes  -         Exercise 3    Exercise Name 3  bike seat 4  -      Time 3  5 min  -         Exercise 6    Exercise Name 6  8\" step up  -MH      Reps 6  20  -      Additional Comments  with foam  -         Exercise 7    Exercise Name 7  Heel toe lifts standing  -      Cueing 7  Verbal  -      Reps 7  20  -         Exercise 10    Exercise Name 10  standing hamstring curl  -      Sets 10  2  -      Reps 10  10  -      Additional Comments  5#  -         Exercise 11    Exercise Name 11  HS stretch on steps  -      Reps 11  3  -      Time 11  20  -MH         Exercise 12    Exercise Name 12  4'' step down  -      Cueing 12  --  -MH      Additional Comments  held for time  -         Exercise 13    Exercise Name 13  Mini squats  -      Cueing 13  --  -      Sets 13  --  -      Reps 13  --  -MH      Additional Comments  held for time  -         Exercise 14    Exercise Name 14  Standing hip abd  -      Cueing 14  Verbal;Demo  -      Sets 14  2  -MH      Reps 14  10  -MH      Additional Comments  5#  -         Exercise 15    Exercise Name 15  leg press  -      Cueing 15  Verbal  -      Sets 15  2  -MH      Reps 15  10  -MH      Additional Comments  170# bi; 100# uni  -         Exercise 16    Exercise Name 16  lateral stepping  -      Cueing 16  Verbal  -      Reps 16  3 laps  -      Additional Comments  GTB at ankles  -         Exercise 17    Exercise Name 17  monster walk  -      Cueing 17  Verbal  -      Reps 17  3 laps  -MH      Additional Comments  GTB at ankles  -MH         " Exercise 18    Exercise Name 18  SLS on foam with trunk rotation  -      Cueing 18  --  -      Sets 18  --  -      Reps 18  --  -      Additional Comments  held for time  -        User Key  (r) = Recorded By, (t) = Taken By, (c) = Cosigned By    Initials Name Provider Type     TreyFunmilayo, PT Physical Therapist                      Manual Rx (last 36 hours)      Manual Treatments     Row Name 12/01/20 1300             Total Minutes    74128 - PT Manual Therapy Minutes  14  -         Manual Rx 1    Manual Rx 1 Location   R knee quad/VMO cross fiber STM  -      Manual Rx 1 Type  pes anserine   -      Manual Rx 1 Grade  instructued in self massage  -         Manual Rx 2    Manual Rx 2 Location  STM hamstrings  -      Manual Rx 2 Type  mombs for extension  -        User Key  (r) = Recorded By, (t) = Taken By, (c) = Cosigned By    Initials Name Provider Type     Funmilayo Yang, PT Physical Therapist          PT OP Goals     Row Name 12/01/20 1300          PT Short Term Goals    STG Date to Achieve  10/23/20  -     STG 1  Pt will demonstrate understanding and compliance with initial HEP.  -     STG 1 Progress  Met  -     STG 2  Pt will demonstrate improved R knee flexion from 94 deg to 100 deg or better.  -     STG 2 Progress  Met  -     STG 3  Pt will ambulate with appropriate gait mechanics using SC.  -     STG 3 Progress  Met  -        Long Term Goals    LTG Date to Achieve  11/08/20  -     LTG 1  Pt will demonstrate improved AROM of R knee to 0-2-120.  -     LTG 1 Progress  Ongoing  -     LTG 1 Progress Comments  lacking 4 degrees extension  -     LTG 2  Pt will demonstrate normalized gait mechanics without AD.  -     LTG 2 Progress  Ongoing;Progressing  -     LTG 2 Progress Comments  slightly antalgic   -     LTG 3  Pt will ascend/descend 3 stairs reciprocally using one handrail.  -     LTG 3 Progress  Met  -       User Key  (r) = Recorded By, (t) = Taken  By, (c) = Cosigned By    Initials Name Provider Type     Funmilayo Yang PT Physical Therapist          Therapy Education  Education Details: hold on strengthening exercises for 1 day and perform 3x/weekly; ice  Given: Symptoms/condition management, Pain management, Posture/body mechanics  Program: Reinforced  How Provided: Verbal, Demonstration  Provided to: Patient  Level of Understanding: Teach back education performed, Verbalized              Time Calculation:   Start Time: 1318  Stop Time: 1358  Time Calculation (min): 40 min  Total Timed Code Minutes- PT: 40 minute(s)  Therapy Charges for Today     Code Description Service Date Service Provider Modifiers Qty    [id number]  PT THER PROC EA 15 MIN 12/1/2020 Funmilayo Yang, PT GP 2    [id number]  PT MANUAL THERAPY EA 15 MIN 12/1/2020 Funmilayo Yang, PT GP 1                    Funmilayo Yang PT  12/1/2020

## 2020-12-03 ENCOUNTER — TRANSCRIBE ORDERS (OUTPATIENT)
Dept: PHYSICAL THERAPY | Facility: HOSPITAL | Age: 50
End: 2020-12-03

## 2020-12-03 DIAGNOSIS — Z96.651 S/P TOTAL KNEE REPLACEMENT, RIGHT: Primary | ICD-10-CM

## 2020-12-10 ENCOUNTER — APPOINTMENT (OUTPATIENT)
Dept: PHYSICAL THERAPY | Facility: HOSPITAL | Age: 50
End: 2020-12-10

## 2021-10-06 ENCOUNTER — TRANSCRIBE ORDERS (OUTPATIENT)
Dept: ADMINISTRATIVE | Facility: HOSPITAL | Age: 51
End: 2021-10-06

## 2021-10-06 DIAGNOSIS — M25.50 ARTHRALGIA, UNSPECIFIED JOINT: Primary | ICD-10-CM

## 2021-10-25 ENCOUNTER — TRANSCRIBE ORDERS (OUTPATIENT)
Dept: ADMINISTRATIVE | Facility: HOSPITAL | Age: 51
End: 2021-10-25

## 2021-10-25 ENCOUNTER — HOSPITAL ENCOUNTER (OUTPATIENT)
Dept: NUCLEAR MEDICINE | Facility: HOSPITAL | Age: 51
Discharge: HOME | End: 2021-10-25

## 2021-10-25 ENCOUNTER — LAB (OUTPATIENT)
Dept: LAB | Facility: HOSPITAL | Age: 51
End: 2021-10-25

## 2021-10-25 DIAGNOSIS — M25.50 ARTHRALGIA, UNSPECIFIED JOINT: ICD-10-CM

## 2021-10-25 DIAGNOSIS — M25.50 ARTHRALGIA, UNSPECIFIED JOINT: Primary | ICD-10-CM

## 2021-10-25 LAB
BASOPHILS # BLD AUTO: 0.04 10*3/MM3 (ref 0–0.2)
BASOPHILS NFR BLD AUTO: 0.8 % (ref 0–1.5)
CRP SERPL-MCNC: <0.3 MG/DL (ref 0–0.5)
DEPRECATED RDW RBC AUTO: 48.7 FL (ref 37–54)
EOSINOPHIL # BLD AUTO: 0.08 10*3/MM3 (ref 0–0.4)
EOSINOPHIL NFR BLD AUTO: 1.7 % (ref 0.3–6.2)
ERYTHROCYTE [DISTWIDTH] IN BLOOD BY AUTOMATED COUNT: 13.2 % (ref 12.3–15.4)
ERYTHROCYTE [SEDIMENTATION RATE] IN BLOOD: 17 MM/HR (ref 0–30)
HCT VFR BLD AUTO: 41.8 % (ref 34–46.6)
HGB BLD-MCNC: 13.3 G/DL (ref 12–15.9)
IMM GRANULOCYTES # BLD AUTO: 0.01 10*3/MM3 (ref 0–0.05)
IMM GRANULOCYTES NFR BLD AUTO: 0.2 % (ref 0–0.5)
LYMPHOCYTES # BLD AUTO: 2.49 10*3/MM3 (ref 0.7–3.1)
LYMPHOCYTES NFR BLD AUTO: 51.4 % (ref 19.6–45.3)
MCH RBC QN AUTO: 30.9 PG (ref 26.6–33)
MCHC RBC AUTO-ENTMCNC: 31.8 G/DL (ref 31.5–35.7)
MCV RBC AUTO: 97 FL (ref 79–97)
MONOCYTES # BLD AUTO: 0.47 10*3/MM3 (ref 0.1–0.9)
MONOCYTES NFR BLD AUTO: 9.7 % (ref 5–12)
NEUTROPHILS NFR BLD AUTO: 1.75 10*3/MM3 (ref 1.7–7)
NEUTROPHILS NFR BLD AUTO: 36.2 % (ref 42.7–76)
NRBC BLD AUTO-RTO: 0.2 /100 WBC (ref 0–0.2)
PLATELET # BLD AUTO: 318 10*3/MM3 (ref 140–450)
PMV BLD AUTO: 9.7 FL (ref 6–12)
RBC # BLD AUTO: 4.31 10*6/MM3 (ref 3.77–5.28)
WBC # BLD AUTO: 4.84 10*3/MM3 (ref 3.4–10.8)

## 2021-10-25 PROCEDURE — 85652 RBC SED RATE AUTOMATED: CPT

## 2021-10-25 PROCEDURE — 85025 COMPLETE CBC W/AUTO DIFF WBC: CPT

## 2021-10-25 PROCEDURE — 0 TECHNETIUM MEDRONATE KIT: Performed by: ORTHOPAEDIC SURGERY

## 2021-10-25 PROCEDURE — 86140 C-REACTIVE PROTEIN: CPT

## 2021-10-25 PROCEDURE — 78315 BONE IMAGING 3 PHASE: CPT

## 2021-10-25 PROCEDURE — A9503 TC99M MEDRONATE: HCPCS | Performed by: ORTHOPAEDIC SURGERY

## 2021-10-25 PROCEDURE — 36415 COLL VENOUS BLD VENIPUNCTURE: CPT

## 2021-10-25 RX ORDER — TC 99M MEDRONATE 20 MG/10ML
21.8 INJECTION, POWDER, LYOPHILIZED, FOR SOLUTION INTRAVENOUS
Status: COMPLETED | OUTPATIENT
Start: 2021-10-25 | End: 2021-10-25

## 2021-10-25 RX ADMIN — Medication 21.8 MILLICURIE: at 10:55

## 2021-11-23 ENCOUNTER — APPOINTMENT (OUTPATIENT)
Dept: NUCLEAR MEDICINE | Facility: HOSPITAL | Age: 51
End: 2021-11-23

## 2022-08-30 ENCOUNTER — HOSPITAL ENCOUNTER (OUTPATIENT)
Dept: GENERAL RADIOLOGY | Facility: HOSPITAL | Age: 52
Discharge: HOME | End: 2022-08-30

## 2022-08-30 ENCOUNTER — PRE-ADMISSION TESTING (OUTPATIENT)
Dept: PREADMISSION TESTING | Facility: HOSPITAL | Age: 52
End: 2022-08-30

## 2022-08-30 VITALS
DIASTOLIC BLOOD PRESSURE: 92 MMHG | HEART RATE: 77 BPM | SYSTOLIC BLOOD PRESSURE: 148 MMHG | RESPIRATION RATE: 18 BRPM | HEIGHT: 66 IN | TEMPERATURE: 98.1 F | WEIGHT: 193.6 LBS | OXYGEN SATURATION: 99 % | BODY MASS INDEX: 31.12 KG/M2

## 2022-08-30 LAB
ALBUMIN SERPL-MCNC: 3.8 G/DL (ref 3.5–5.2)
ALBUMIN/GLOB SERPL: 1.4 G/DL
ALP SERPL-CCNC: 116 U/L (ref 39–117)
ALT SERPL W P-5'-P-CCNC: 15 U/L (ref 1–33)
ANION GAP SERPL CALCULATED.3IONS-SCNC: 10.5 MMOL/L (ref 5–15)
AST SERPL-CCNC: 19 U/L (ref 1–32)
BILIRUB SERPL-MCNC: 0.3 MG/DL (ref 0–1.2)
BILIRUB UR QL STRIP: NEGATIVE
BUN SERPL-MCNC: 15 MG/DL (ref 6–20)
BUN/CREAT SERPL: 15.3 (ref 7–25)
CALCIUM SPEC-SCNC: 9 MG/DL (ref 8.6–10.5)
CHLORIDE SERPL-SCNC: 105 MMOL/L (ref 98–107)
CLARITY UR: CLEAR
CO2 SERPL-SCNC: 24.5 MMOL/L (ref 22–29)
COLOR UR: YELLOW
CREAT SERPL-MCNC: 0.98 MG/DL (ref 0.57–1)
CRP SERPL-MCNC: 0.32 MG/DL (ref 0–0.5)
DEPRECATED RDW RBC AUTO: 44.3 FL (ref 37–54)
EGFRCR SERPLBLD CKD-EPI 2021: 69.2 ML/MIN/1.73
ERYTHROCYTE [DISTWIDTH] IN BLOOD BY AUTOMATED COUNT: 12.9 % (ref 12.3–15.4)
ERYTHROCYTE [SEDIMENTATION RATE] IN BLOOD: 19 MM/HR (ref 0–30)
GLOBULIN UR ELPH-MCNC: 2.7 GM/DL
GLUCOSE SERPL-MCNC: 94 MG/DL (ref 65–99)
GLUCOSE UR STRIP-MCNC: NEGATIVE MG/DL
HCT VFR BLD AUTO: 37.3 % (ref 34–46.6)
HGB BLD-MCNC: 12.4 G/DL (ref 12–15.9)
HGB UR QL STRIP.AUTO: NEGATIVE
INR PPP: 0.98 (ref 0.9–1.1)
KETONES UR QL STRIP: ABNORMAL
LEUKOCYTE ESTERASE UR QL STRIP.AUTO: NEGATIVE
MCH RBC QN AUTO: 31.3 PG (ref 26.6–33)
MCHC RBC AUTO-ENTMCNC: 33.2 G/DL (ref 31.5–35.7)
MCV RBC AUTO: 94.2 FL (ref 79–97)
NITRITE UR QL STRIP: NEGATIVE
PH UR STRIP.AUTO: 6 [PH] (ref 5–8)
PLATELET # BLD AUTO: 349 10*3/MM3 (ref 140–450)
PMV BLD AUTO: 8.7 FL (ref 6–12)
POTASSIUM SERPL-SCNC: 3.4 MMOL/L (ref 3.5–5.2)
PROT SERPL-MCNC: 6.5 G/DL (ref 6–8.5)
PROT UR QL STRIP: ABNORMAL
PROTHROMBIN TIME: 12.9 SECONDS (ref 11.7–14.2)
QT INTERVAL: 408 MS
RBC # BLD AUTO: 3.96 10*6/MM3 (ref 3.77–5.28)
SODIUM SERPL-SCNC: 140 MMOL/L (ref 136–145)
SP GR UR STRIP: >=1.03 (ref 1–1.03)
UROBILINOGEN UR QL STRIP: ABNORMAL
WBC NRBC COR # BLD: 4.77 10*3/MM3 (ref 3.4–10.8)

## 2022-08-30 PROCEDURE — 85610 PROTHROMBIN TIME: CPT

## 2022-08-30 PROCEDURE — 36415 COLL VENOUS BLD VENIPUNCTURE: CPT

## 2022-08-30 PROCEDURE — 81003 URINALYSIS AUTO W/O SCOPE: CPT

## 2022-08-30 PROCEDURE — 85652 RBC SED RATE AUTOMATED: CPT

## 2022-08-30 PROCEDURE — 86140 C-REACTIVE PROTEIN: CPT

## 2022-08-30 PROCEDURE — 87086 URINE CULTURE/COLONY COUNT: CPT

## 2022-08-30 PROCEDURE — 85027 COMPLETE CBC AUTOMATED: CPT

## 2022-08-30 PROCEDURE — 80053 COMPREHEN METABOLIC PANEL: CPT

## 2022-08-30 PROCEDURE — 93005 ELECTROCARDIOGRAM TRACING: CPT

## 2022-08-30 PROCEDURE — 73560 X-RAY EXAM OF KNEE 1 OR 2: CPT

## 2022-08-30 PROCEDURE — 93010 ELECTROCARDIOGRAM REPORT: CPT | Performed by: INTERNAL MEDICINE

## 2022-08-30 PROCEDURE — 71046 X-RAY EXAM CHEST 2 VIEWS: CPT

## 2022-08-30 RX ORDER — ACETAMINOPHEN 500 MG
500 TABLET ORAL EVERY 6 HOURS PRN
COMMUNITY

## 2022-08-30 RX ORDER — AMLODIPINE BESYLATE 5 MG/1
5 TABLET ORAL NIGHTLY
COMMUNITY

## 2022-08-30 RX ORDER — HYDROCHLOROTHIAZIDE 25 MG/1
12.5 TABLET ORAL NIGHTLY
COMMUNITY

## 2022-08-30 ASSESSMENT — KOOS JR
KOOS JR SCORE: 47.487
KOOS JR SCORE: 16

## 2022-08-31 LAB — BACTERIA SPEC AEROBE CULT: NO GROWTH

## 2022-09-08 ENCOUNTER — APPOINTMENT (OUTPATIENT)
Dept: GENERAL RADIOLOGY | Facility: HOSPITAL | Age: 52
End: 2022-09-08

## 2022-09-08 ENCOUNTER — ANESTHESIA (OUTPATIENT)
Dept: PERIOP | Facility: HOSPITAL | Age: 52
End: 2022-09-08

## 2022-09-08 ENCOUNTER — ANESTHESIA EVENT (OUTPATIENT)
Dept: PERIOP | Facility: HOSPITAL | Age: 52
End: 2022-09-08

## 2022-09-08 ENCOUNTER — HOSPITAL ENCOUNTER (INPATIENT)
Facility: HOSPITAL | Age: 52
LOS: 1 days | Discharge: HOME HEALTH | End: 2022-09-09
Attending: ORTHOPAEDIC SURGERY | Admitting: ORTHOPAEDIC SURGERY

## 2022-09-08 DIAGNOSIS — Z96.659 MECHANICAL LOOSENING OF PROSTHETIC KNEE, INITIAL ENCOUNTER: Primary | ICD-10-CM

## 2022-09-08 DIAGNOSIS — T84.038A MECHANICAL LOOSENING OF PROSTHETIC KNEE, INITIAL ENCOUNTER: Primary | ICD-10-CM

## 2022-09-08 PROCEDURE — 25010000002 DEXAMETHASONE PER 1 MG: Performed by: NURSE ANESTHETIST, CERTIFIED REGISTERED

## 2022-09-08 PROCEDURE — G0378 HOSPITAL OBSERVATION PER HR: HCPCS

## 2022-09-08 PROCEDURE — 0SPC0JZ REMOVAL OF SYNTHETIC SUBSTITUTE FROM RIGHT KNEE JOINT, OPEN APPROACH: ICD-10-PCS | Performed by: ORTHOPAEDIC SURGERY

## 2022-09-08 PROCEDURE — 25010000002 KETOROLAC TROMETHAMINE PER 15 MG: Performed by: ORTHOPAEDIC SURGERY

## 2022-09-08 PROCEDURE — 25010000002 MORPHINE PER 10 MG: Performed by: PHYSICIAN ASSISTANT

## 2022-09-08 PROCEDURE — 25010000002 EPINEPHRINE 1 MG/ML SOLUTION 30 ML VIAL: Performed by: ORTHOPAEDIC SURGERY

## 2022-09-08 PROCEDURE — C1713 ANCHOR/SCREW BN/BN,TIS/BN: HCPCS | Performed by: ORTHOPAEDIC SURGERY

## 2022-09-08 PROCEDURE — 25010000002 FENTANYL CITRATE (PF) 50 MCG/ML SOLUTION: Performed by: NURSE ANESTHETIST, CERTIFIED REGISTERED

## 2022-09-08 PROCEDURE — 25010000002 MIDAZOLAM PER 1 MG: Performed by: ANESTHESIOLOGY

## 2022-09-08 PROCEDURE — 25010000002 CEFAZOLIN IN DEXTROSE 2-4 GM/100ML-% SOLUTION: Performed by: ORTHOPAEDIC SURGERY

## 2022-09-08 PROCEDURE — 25010000002 HYDROMORPHONE PER 4 MG: Performed by: ANESTHESIOLOGY

## 2022-09-08 PROCEDURE — 25010000002 FENTANYL CITRATE (PF) 50 MCG/ML SOLUTION: Performed by: ANESTHESIOLOGY

## 2022-09-08 PROCEDURE — 25010000002 MORPHINE PER 10 MG: Performed by: ORTHOPAEDIC SURGERY

## 2022-09-08 PROCEDURE — 25010000002 NEOSTIGMINE 5 MG/10ML SOLUTION: Performed by: ANESTHESIOLOGY

## 2022-09-08 PROCEDURE — 25010000002 PROPOFOL 10 MG/ML EMULSION: Performed by: NURSE ANESTHETIST, CERTIFIED REGISTERED

## 2022-09-08 PROCEDURE — 25010000002 HYDROMORPHONE PER 4 MG: Performed by: NURSE ANESTHETIST, CERTIFIED REGISTERED

## 2022-09-08 PROCEDURE — C1776 JOINT DEVICE (IMPLANTABLE): HCPCS | Performed by: ORTHOPAEDIC SURGERY

## 2022-09-08 PROCEDURE — 25010000002 ONDANSETRON PER 1 MG: Performed by: ANESTHESIOLOGY

## 2022-09-08 PROCEDURE — 25010000002 ROPIVACAINE PER 1 MG: Performed by: ORTHOPAEDIC SURGERY

## 2022-09-08 PROCEDURE — 25010000002 DEXAMETHASONE PER 1 MG: Performed by: ANESTHESIOLOGY

## 2022-09-08 PROCEDURE — 76942 ECHO GUIDE FOR BIOPSY: CPT | Performed by: ORTHOPAEDIC SURGERY

## 2022-09-08 PROCEDURE — 0SRC0J9 REPLACEMENT OF RIGHT KNEE JOINT WITH SYNTHETIC SUBSTITUTE, CEMENTED, OPEN APPROACH: ICD-10-PCS | Performed by: ORTHOPAEDIC SURGERY

## 2022-09-08 PROCEDURE — 73560 X-RAY EXAM OF KNEE 1 OR 2: CPT

## 2022-09-08 DEVICE — BAR TIB ASCENT/MAXIM PRI INTERLOK: Type: IMPLANTABLE DEVICE | Site: KNEE | Status: FUNCTIONAL

## 2022-09-08 DEVICE — SCRW TI 5.0MM W ANCHR NO2: Type: IMPLANTABLE DEVICE | Site: KNEE | Status: FUNCTIONAL

## 2022-09-08 DEVICE — CMT BONE R 1X40: Type: IMPLANTABLE DEVICE | Site: KNEE | Status: FUNCTIONAL

## 2022-09-08 DEVICE — IMPLANTABLE DEVICE: Type: IMPLANTABLE DEVICE | Site: KNEE | Status: FUNCTIONAL

## 2022-09-08 RX ORDER — PROMETHAZINE HYDROCHLORIDE 25 MG/1
25 TABLET ORAL ONCE AS NEEDED
Status: DISCONTINUED | OUTPATIENT
Start: 2022-09-08 | End: 2022-09-08 | Stop reason: HOSPADM

## 2022-09-08 RX ORDER — DIPHENHYDRAMINE HCL 25 MG
25 CAPSULE ORAL EVERY 6 HOURS PRN
Status: DISCONTINUED | OUTPATIENT
Start: 2022-09-08 | End: 2022-09-09 | Stop reason: HOSPADM

## 2022-09-08 RX ORDER — CELECOXIB 200 MG/1
200 CAPSULE ORAL ONCE
Status: COMPLETED | OUTPATIENT
Start: 2022-09-08 | End: 2022-09-08

## 2022-09-08 RX ORDER — SODIUM CHLORIDE 450 MG/100ML
100 INJECTION, SOLUTION INTRAVENOUS CONTINUOUS
Status: DISCONTINUED | OUTPATIENT
Start: 2022-09-08 | End: 2022-09-09 | Stop reason: HOSPADM

## 2022-09-08 RX ORDER — AMLODIPINE BESYLATE 5 MG/1
5 TABLET ORAL NIGHTLY
Status: DISCONTINUED | OUTPATIENT
Start: 2022-09-08 | End: 2022-09-09 | Stop reason: HOSPADM

## 2022-09-08 RX ORDER — CELECOXIB 200 MG/1
200 CAPSULE ORAL ONCE
Status: DISCONTINUED | OUTPATIENT
Start: 2022-09-08 | End: 2022-09-08 | Stop reason: HOSPADM

## 2022-09-08 RX ORDER — MAGNESIUM HYDROXIDE 1200 MG/15ML
LIQUID ORAL AS NEEDED
Status: DISCONTINUED | OUTPATIENT
Start: 2022-09-08 | End: 2022-09-08 | Stop reason: HOSPADM

## 2022-09-08 RX ORDER — HYDROCHLOROTHIAZIDE 12.5 MG/1
12.5 TABLET ORAL NIGHTLY
Status: DISCONTINUED | OUTPATIENT
Start: 2022-09-08 | End: 2022-09-09 | Stop reason: HOSPADM

## 2022-09-08 RX ORDER — ASPIRIN 325 MG
325 TABLET, DELAYED RELEASE (ENTERIC COATED) ORAL EVERY 12 HOURS SCHEDULED
Status: DISCONTINUED | OUTPATIENT
Start: 2022-09-09 | End: 2022-09-09 | Stop reason: HOSPADM

## 2022-09-08 RX ORDER — CHOLECALCIFEROL (VITAMIN D3) 125 MCG
5 CAPSULE ORAL NIGHTLY PRN
Status: DISCONTINUED | OUTPATIENT
Start: 2022-09-08 | End: 2022-09-09 | Stop reason: HOSPADM

## 2022-09-08 RX ORDER — OXYCODONE HYDROCHLORIDE AND ACETAMINOPHEN 5; 325 MG/1; MG/1
1-2 TABLET ORAL EVERY 4 HOURS PRN
Qty: 60 TABLET | Refills: 0 | Status: SHIPPED | OUTPATIENT
Start: 2022-09-08

## 2022-09-08 RX ORDER — CEFAZOLIN SODIUM 2 G/100ML
2 INJECTION, SOLUTION INTRAVENOUS ONCE
Status: COMPLETED | OUTPATIENT
Start: 2022-09-08 | End: 2022-09-08

## 2022-09-08 RX ORDER — DIPHENHYDRAMINE HCL 25 MG
25 CAPSULE ORAL
Status: DISCONTINUED | OUTPATIENT
Start: 2022-09-08 | End: 2022-09-08 | Stop reason: HOSPADM

## 2022-09-08 RX ORDER — EPHEDRINE SULFATE 50 MG/ML
5 INJECTION, SOLUTION INTRAVENOUS ONCE AS NEEDED
Status: DISCONTINUED | OUTPATIENT
Start: 2022-09-08 | End: 2022-09-08 | Stop reason: HOSPADM

## 2022-09-08 RX ORDER — ROCURONIUM BROMIDE 10 MG/ML
INJECTION, SOLUTION INTRAVENOUS AS NEEDED
Status: DISCONTINUED | OUTPATIENT
Start: 2022-09-08 | End: 2022-09-08 | Stop reason: SURG

## 2022-09-08 RX ORDER — ACETAMINOPHEN 500 MG
1000 TABLET ORAL ONCE
Status: COMPLETED | OUTPATIENT
Start: 2022-09-08 | End: 2022-09-08

## 2022-09-08 RX ORDER — DIAZEPAM 5 MG/1
5 TABLET ORAL EVERY 6 HOURS PRN
Status: DISCONTINUED | OUTPATIENT
Start: 2022-09-08 | End: 2022-09-09 | Stop reason: HOSPADM

## 2022-09-08 RX ORDER — FERROUS SULFATE 325(65) MG
325 TABLET ORAL
Status: DISCONTINUED | OUTPATIENT
Start: 2022-09-09 | End: 2022-09-09 | Stop reason: HOSPADM

## 2022-09-08 RX ORDER — HYDRALAZINE HYDROCHLORIDE 20 MG/ML
5 INJECTION INTRAMUSCULAR; INTRAVENOUS
Status: DISCONTINUED | OUTPATIENT
Start: 2022-09-08 | End: 2022-09-08 | Stop reason: HOSPADM

## 2022-09-08 RX ORDER — ASPIRIN 325 MG
325 TABLET ORAL 2 TIMES DAILY WITH MEALS
Qty: 60 TABLET | Refills: 0 | Status: SHIPPED | OUTPATIENT
Start: 2022-09-08

## 2022-09-08 RX ORDER — DIPHENHYDRAMINE HYDROCHLORIDE 50 MG/ML
12.5 INJECTION INTRAMUSCULAR; INTRAVENOUS EVERY 6 HOURS PRN
Status: DISCONTINUED | OUTPATIENT
Start: 2022-09-08 | End: 2022-09-09 | Stop reason: HOSPADM

## 2022-09-08 RX ORDER — NEOSTIGMINE METHYLSULFATE 0.5 MG/ML
INJECTION, SOLUTION INTRAVENOUS AS NEEDED
Status: DISCONTINUED | OUTPATIENT
Start: 2022-09-08 | End: 2022-09-08 | Stop reason: SURG

## 2022-09-08 RX ORDER — FLUMAZENIL 0.1 MG/ML
0.2 INJECTION INTRAVENOUS AS NEEDED
Status: DISCONTINUED | OUTPATIENT
Start: 2022-09-08 | End: 2022-09-08 | Stop reason: HOSPADM

## 2022-09-08 RX ORDER — PROMETHAZINE HYDROCHLORIDE 25 MG/1
25 SUPPOSITORY RECTAL ONCE AS NEEDED
Status: DISCONTINUED | OUTPATIENT
Start: 2022-09-08 | End: 2022-09-08 | Stop reason: HOSPADM

## 2022-09-08 RX ORDER — SODIUM CHLORIDE 0.9 % (FLUSH) 0.9 %
10 SYRINGE (ML) INJECTION EVERY 12 HOURS SCHEDULED
Status: DISCONTINUED | OUTPATIENT
Start: 2022-09-08 | End: 2022-09-09 | Stop reason: HOSPADM

## 2022-09-08 RX ORDER — PROPOFOL 10 MG/ML
VIAL (ML) INTRAVENOUS AS NEEDED
Status: DISCONTINUED | OUTPATIENT
Start: 2022-09-08 | End: 2022-09-08 | Stop reason: SURG

## 2022-09-08 RX ORDER — DOCUSATE SODIUM 100 MG/1
100 CAPSULE, LIQUID FILLED ORAL 2 TIMES DAILY PRN
Status: DISCONTINUED | OUTPATIENT
Start: 2022-09-08 | End: 2022-09-09 | Stop reason: HOSPADM

## 2022-09-08 RX ORDER — ONDANSETRON 4 MG/1
4 TABLET, FILM COATED ORAL EVERY 6 HOURS PRN
Status: DISCONTINUED | OUTPATIENT
Start: 2022-09-08 | End: 2022-09-09 | Stop reason: HOSPADM

## 2022-09-08 RX ORDER — OXYCODONE HYDROCHLORIDE AND ACETAMINOPHEN 5; 325 MG/1; MG/1
1 TABLET ORAL EVERY 4 HOURS PRN
Status: DISCONTINUED | OUTPATIENT
Start: 2022-09-08 | End: 2022-09-09 | Stop reason: HOSPADM

## 2022-09-08 RX ORDER — GLYCOPYRROLATE 0.2 MG/ML
INJECTION INTRAMUSCULAR; INTRAVENOUS AS NEEDED
Status: DISCONTINUED | OUTPATIENT
Start: 2022-09-08 | End: 2022-09-08 | Stop reason: SURG

## 2022-09-08 RX ORDER — SODIUM CHLORIDE 0.9 % (FLUSH) 0.9 %
10 SYRINGE (ML) INJECTION EVERY 12 HOURS SCHEDULED
Status: DISCONTINUED | OUTPATIENT
Start: 2022-09-08 | End: 2022-09-08 | Stop reason: HOSPADM

## 2022-09-08 RX ORDER — ACETAMINOPHEN 325 MG/1
325 TABLET ORAL EVERY 4 HOURS PRN
Status: DISCONTINUED | OUTPATIENT
Start: 2022-09-08 | End: 2022-09-09 | Stop reason: HOSPADM

## 2022-09-08 RX ORDER — FENTANYL CITRATE 50 UG/ML
50 INJECTION, SOLUTION INTRAMUSCULAR; INTRAVENOUS
Status: DISCONTINUED | OUTPATIENT
Start: 2022-09-08 | End: 2022-09-08 | Stop reason: HOSPADM

## 2022-09-08 RX ORDER — HYDROMORPHONE HYDROCHLORIDE 1 MG/ML
0.5 INJECTION, SOLUTION INTRAMUSCULAR; INTRAVENOUS; SUBCUTANEOUS
Status: DISCONTINUED | OUTPATIENT
Start: 2022-09-08 | End: 2022-09-08 | Stop reason: HOSPADM

## 2022-09-08 RX ORDER — ONDANSETRON 2 MG/ML
INJECTION INTRAMUSCULAR; INTRAVENOUS AS NEEDED
Status: DISCONTINUED | OUTPATIENT
Start: 2022-09-08 | End: 2022-09-08 | Stop reason: SURG

## 2022-09-08 RX ORDER — MORPHINE SULFATE 4 MG/ML
4 INJECTION, SOLUTION INTRAMUSCULAR; INTRAVENOUS
Status: DISCONTINUED | OUTPATIENT
Start: 2022-09-08 | End: 2022-09-09 | Stop reason: HOSPADM

## 2022-09-08 RX ORDER — CLINDAMYCIN PHOSPHATE 900 MG/50ML
900 INJECTION INTRAVENOUS EVERY 8 HOURS
Status: COMPLETED | OUTPATIENT
Start: 2022-09-08 | End: 2022-09-09

## 2022-09-08 RX ORDER — FENTANYL CITRATE 50 UG/ML
50 INJECTION, SOLUTION INTRAMUSCULAR; INTRAVENOUS ONCE
Status: COMPLETED | OUTPATIENT
Start: 2022-09-08 | End: 2022-09-08

## 2022-09-08 RX ORDER — HYDROMORPHONE HCL 110MG/55ML
PATIENT CONTROLLED ANALGESIA SYRINGE INTRAVENOUS AS NEEDED
Status: DISCONTINUED | OUTPATIENT
Start: 2022-09-08 | End: 2022-09-08 | Stop reason: SURG

## 2022-09-08 RX ORDER — MIDAZOLAM HYDROCHLORIDE 1 MG/ML
1 INJECTION INTRAMUSCULAR; INTRAVENOUS
Status: DISCONTINUED | OUTPATIENT
Start: 2022-09-08 | End: 2022-09-08 | Stop reason: HOSPADM

## 2022-09-08 RX ORDER — OXYCODONE AND ACETAMINOPHEN 7.5; 325 MG/1; MG/1
1 TABLET ORAL EVERY 4 HOURS PRN
Status: DISCONTINUED | OUTPATIENT
Start: 2022-09-08 | End: 2022-09-08 | Stop reason: HOSPADM

## 2022-09-08 RX ORDER — ONDANSETRON 2 MG/ML
4 INJECTION INTRAMUSCULAR; INTRAVENOUS ONCE AS NEEDED
Status: DISCONTINUED | OUTPATIENT
Start: 2022-09-08 | End: 2022-09-08 | Stop reason: HOSPADM

## 2022-09-08 RX ORDER — TRANEXAMIC ACID 100 MG/ML
INJECTION, SOLUTION INTRAVENOUS AS NEEDED
Status: DISCONTINUED | OUTPATIENT
Start: 2022-09-08 | End: 2022-09-08 | Stop reason: SURG

## 2022-09-08 RX ORDER — FENTANYL CITRATE 50 UG/ML
INJECTION, SOLUTION INTRAMUSCULAR; INTRAVENOUS AS NEEDED
Status: DISCONTINUED | OUTPATIENT
Start: 2022-09-08 | End: 2022-09-08 | Stop reason: SURG

## 2022-09-08 RX ORDER — BUPIVACAINE HYDROCHLORIDE 5 MG/ML
INJECTION, SOLUTION EPIDURAL; INTRACAUDAL
Status: COMPLETED | OUTPATIENT
Start: 2022-09-08 | End: 2022-09-08

## 2022-09-08 RX ORDER — NALOXONE HCL 0.4 MG/ML
0.2 VIAL (ML) INJECTION AS NEEDED
Status: DISCONTINUED | OUTPATIENT
Start: 2022-09-08 | End: 2022-09-08 | Stop reason: HOSPADM

## 2022-09-08 RX ORDER — DOCUSATE SODIUM 250 MG
250 CAPSULE ORAL 2 TIMES DAILY PRN
Qty: 30 CAPSULE | Refills: 1 | Status: SHIPPED | OUTPATIENT
Start: 2022-09-08

## 2022-09-08 RX ORDER — OXYCODONE HYDROCHLORIDE AND ACETAMINOPHEN 5; 325 MG/1; MG/1
2 TABLET ORAL EVERY 4 HOURS PRN
Status: DISCONTINUED | OUTPATIENT
Start: 2022-09-08 | End: 2022-09-09 | Stop reason: HOSPADM

## 2022-09-08 RX ORDER — SODIUM CHLORIDE 0.9 % (FLUSH) 0.9 %
10 SYRINGE (ML) INJECTION AS NEEDED
Status: DISCONTINUED | OUTPATIENT
Start: 2022-09-08 | End: 2022-09-08 | Stop reason: HOSPADM

## 2022-09-08 RX ORDER — HYDROCODONE BITARTRATE AND ACETAMINOPHEN 5; 325 MG/1; MG/1
1 TABLET ORAL ONCE AS NEEDED
Status: DISCONTINUED | OUTPATIENT
Start: 2022-09-08 | End: 2022-09-08 | Stop reason: HOSPADM

## 2022-09-08 RX ORDER — ACETAMINOPHEN 500 MG
1000 TABLET ORAL ONCE
Status: DISCONTINUED | OUTPATIENT
Start: 2022-09-08 | End: 2022-09-08 | Stop reason: HOSPADM

## 2022-09-08 RX ORDER — CEFAZOLIN SODIUM 2 G/100ML
2 INJECTION, SOLUTION INTRAVENOUS ONCE
Status: DISCONTINUED | OUTPATIENT
Start: 2022-09-08 | End: 2022-09-08 | Stop reason: HOSPADM

## 2022-09-08 RX ORDER — PROMETHAZINE HYDROCHLORIDE 12.5 MG/1
12.5 TABLET ORAL EVERY 6 HOURS PRN
Status: DISCONTINUED | OUTPATIENT
Start: 2022-09-08 | End: 2022-09-09 | Stop reason: HOSPADM

## 2022-09-08 RX ORDER — LIDOCAINE HYDROCHLORIDE 20 MG/ML
INJECTION, SOLUTION INFILTRATION; PERINEURAL AS NEEDED
Status: DISCONTINUED | OUTPATIENT
Start: 2022-09-08 | End: 2022-09-08 | Stop reason: SURG

## 2022-09-08 RX ORDER — ONDANSETRON 2 MG/ML
4 INJECTION INTRAMUSCULAR; INTRAVENOUS EVERY 6 HOURS PRN
Status: DISCONTINUED | OUTPATIENT
Start: 2022-09-08 | End: 2022-09-09 | Stop reason: HOSPADM

## 2022-09-08 RX ORDER — DEXAMETHASONE SODIUM PHOSPHATE 4 MG/ML
INJECTION, SOLUTION INTRA-ARTICULAR; INTRALESIONAL; INTRAMUSCULAR; INTRAVENOUS; SOFT TISSUE
Status: COMPLETED | OUTPATIENT
Start: 2022-09-08 | End: 2022-09-08

## 2022-09-08 RX ORDER — MIDAZOLAM HYDROCHLORIDE 1 MG/ML
2 INJECTION INTRAMUSCULAR; INTRAVENOUS ONCE
Status: COMPLETED | OUTPATIENT
Start: 2022-09-08 | End: 2022-09-08

## 2022-09-08 RX ORDER — ACETAMINOPHEN 500 MG
500 TABLET ORAL EVERY 6 HOURS PRN
Status: DISCONTINUED | OUTPATIENT
Start: 2022-09-08 | End: 2022-09-09 | Stop reason: HOSPADM

## 2022-09-08 RX ORDER — POVIDONE-IODINE 10 MG/ML
SOLUTION TOPICAL
Status: COMPLETED | OUTPATIENT
Start: 2022-09-08 | End: 2022-09-08

## 2022-09-08 RX ORDER — DEXAMETHASONE SODIUM PHOSPHATE 10 MG/ML
INJECTION INTRAMUSCULAR; INTRAVENOUS AS NEEDED
Status: DISCONTINUED | OUTPATIENT
Start: 2022-09-08 | End: 2022-09-08 | Stop reason: SURG

## 2022-09-08 RX ORDER — NALOXONE HCL 0.4 MG/ML
0.4 VIAL (ML) INJECTION
Status: DISCONTINUED | OUTPATIENT
Start: 2022-09-08 | End: 2022-09-09 | Stop reason: HOSPADM

## 2022-09-08 RX ORDER — DIPHENHYDRAMINE HYDROCHLORIDE 50 MG/ML
12.5 INJECTION INTRAMUSCULAR; INTRAVENOUS
Status: DISCONTINUED | OUTPATIENT
Start: 2022-09-08 | End: 2022-09-08 | Stop reason: HOSPADM

## 2022-09-08 RX ORDER — SODIUM CHLORIDE, SODIUM LACTATE, POTASSIUM CHLORIDE, CALCIUM CHLORIDE 600; 310; 30; 20 MG/100ML; MG/100ML; MG/100ML; MG/100ML
9 INJECTION, SOLUTION INTRAVENOUS CONTINUOUS PRN
Status: DISCONTINUED | OUTPATIENT
Start: 2022-09-08 | End: 2022-09-09 | Stop reason: HOSPADM

## 2022-09-08 RX ORDER — LABETALOL HYDROCHLORIDE 5 MG/ML
5 INJECTION, SOLUTION INTRAVENOUS
Status: DISCONTINUED | OUTPATIENT
Start: 2022-09-08 | End: 2022-09-08 | Stop reason: HOSPADM

## 2022-09-08 RX ORDER — SODIUM CHLORIDE 0.9 % (FLUSH) 0.9 %
1-10 SYRINGE (ML) INJECTION AS NEEDED
Status: DISCONTINUED | OUTPATIENT
Start: 2022-09-08 | End: 2022-09-09 | Stop reason: HOSPADM

## 2022-09-08 RX ADMIN — NEOSTIGMINE METHYLSULFATE 2 MG: 0.5 INJECTION INTRAVENOUS at 15:53

## 2022-09-08 RX ADMIN — MIDAZOLAM HYDROCHLORIDE 2 MG: 1 INJECTION, SOLUTION INTRAMUSCULAR; INTRAVENOUS at 12:41

## 2022-09-08 RX ADMIN — ACETAMINOPHEN 1000 MG: 500 TABLET, FILM COATED ORAL at 11:10

## 2022-09-08 RX ADMIN — HYDROMORPHONE HYDROCHLORIDE 0.5 MG: 2 INJECTION, SOLUTION INTRAMUSCULAR; INTRAVENOUS; SUBCUTANEOUS at 15:53

## 2022-09-08 RX ADMIN — OXYCODONE AND ACETAMINOPHEN 2 TABLET: 5; 325 TABLET ORAL at 23:07

## 2022-09-08 RX ADMIN — BUPIVACAINE HYDROCHLORIDE 30 ML: 5 INJECTION, SOLUTION EPIDURAL; INTRACAUDAL; PERINEURAL at 12:59

## 2022-09-08 RX ADMIN — MUPIROCIN 1 APPLICATION: 20 OINTMENT TOPICAL at 20:16

## 2022-09-08 RX ADMIN — DEXAMETHASONE SODIUM PHOSPHATE 4 MG: 4 INJECTION, SOLUTION INTRAMUSCULAR; INTRAVENOUS at 12:59

## 2022-09-08 RX ADMIN — CEFAZOLIN SODIUM 2 G: 2 INJECTION, SOLUTION INTRAVENOUS at 14:02

## 2022-09-08 RX ADMIN — LIDOCAINE HYDROCHLORIDE 60 MG: 20 INJECTION, SOLUTION INFILTRATION; PERINEURAL at 14:13

## 2022-09-08 RX ADMIN — FENTANYL CITRATE 50 MCG: 50 INJECTION INTRAMUSCULAR; INTRAVENOUS at 12:41

## 2022-09-08 RX ADMIN — HYDROMORPHONE HYDROCHLORIDE 0.5 MG: 1 INJECTION, SOLUTION INTRAMUSCULAR; INTRAVENOUS; SUBCUTANEOUS at 17:19

## 2022-09-08 RX ADMIN — HYDROMORPHONE HYDROCHLORIDE 0.5 MG: 2 INJECTION, SOLUTION INTRAMUSCULAR; INTRAVENOUS; SUBCUTANEOUS at 15:46

## 2022-09-08 RX ADMIN — GLYCOPYRROLATE 0.4 MG: 0.2 INJECTION INTRAMUSCULAR; INTRAVENOUS at 15:53

## 2022-09-08 RX ADMIN — ONDANSETRON 4 MG: 2 INJECTION INTRAMUSCULAR; INTRAVENOUS at 15:50

## 2022-09-08 RX ADMIN — FENTANYL CITRATE 50 MCG: 0.05 INJECTION, SOLUTION INTRAMUSCULAR; INTRAVENOUS at 14:32

## 2022-09-08 RX ADMIN — FENTANYL CITRATE 50 MCG: 50 INJECTION INTRAMUSCULAR; INTRAVENOUS at 16:34

## 2022-09-08 RX ADMIN — SODIUM CHLORIDE 100 ML/HR: 4.5 INJECTION, SOLUTION INTRAVENOUS at 20:06

## 2022-09-08 RX ADMIN — PROPOFOL 150 MG: 10 INJECTION, EMULSION INTRAVENOUS at 14:13

## 2022-09-08 RX ADMIN — Medication 10 ML: at 20:43

## 2022-09-08 RX ADMIN — FENTANYL CITRATE 50 MCG: 0.05 INJECTION, SOLUTION INTRAMUSCULAR; INTRAVENOUS at 14:27

## 2022-09-08 RX ADMIN — TRANEXAMIC ACID 1000 MG: 1 INJECTION, SOLUTION INTRAVENOUS at 14:27

## 2022-09-08 RX ADMIN — POVIDONE-IODINE: 10 SOLUTION TOPICAL at 12:40

## 2022-09-08 RX ADMIN — DEXAMETHASONE SODIUM PHOSPHATE 8 MG: 10 INJECTION INTRAMUSCULAR; INTRAVENOUS at 14:32

## 2022-09-08 RX ADMIN — CLINDAMYCIN PHOSPHATE 900 MG: 900 INJECTION, SOLUTION INTRAVENOUS at 20:06

## 2022-09-08 RX ADMIN — SODIUM CHLORIDE, POTASSIUM CHLORIDE, SODIUM LACTATE AND CALCIUM CHLORIDE: 600; 310; 30; 20 INJECTION, SOLUTION INTRAVENOUS at 13:56

## 2022-09-08 RX ADMIN — CELECOXIB 200 MG: 200 CAPSULE ORAL at 11:10

## 2022-09-08 RX ADMIN — OXYCODONE HYDROCHLORIDE AND ACETAMINOPHEN 1 TABLET: 7.5; 325 TABLET ORAL at 17:27

## 2022-09-08 RX ADMIN — MORPHINE SULFATE 4 MG: 4 INJECTION, SOLUTION INTRAMUSCULAR; INTRAVENOUS at 20:16

## 2022-09-08 RX ADMIN — ROCURONIUM BROMIDE 40 MG: 50 INJECTION INTRAVENOUS at 14:13

## 2022-09-08 NOTE — ANESTHESIA POSTPROCEDURE EVALUATION
Patient: Funmilayo Concepcion    Procedure Summary     Date: 09/08/22 Room / Location:  [facility] [address] [place] [address] [facility]    Anesthesia Start: 1405 Anesthesia Stop: 1618    Procedure: TOTAL KNEE ARTHROPLASTY REVISION (Right Knee) Diagnosis:     Surgeons: Kevin Green MD Provider: Patric Carbajal MD    Anesthesia Type: general with block ASA Status: 2          Anesthesia Type: general with block    Vitals  Vitals Value Taken Time   /71 09/08/22 1745   Temp 36.6 °C (97.9 °F) 09/08/22 1617   Pulse 70 09/08/22 1746   Resp 14 09/08/22 1745   SpO2 99 % 09/08/22 1746   Vitals shown include unvalidated device data.        Post Anesthesia Care and Evaluation    Patient location during evaluation: bedside  Patient participation: complete - patient participated  Level of consciousness: awake and alert  Pain management: adequate    Airway patency: patent  Anesthetic complications: No anesthetic complications  PONV Status: controlled  Cardiovascular status: blood pressure returned to baseline and acceptable  Respiratory status: acceptable  Hydration status: acceptable

## 2022-09-08 NOTE — ANESTHESIA PROCEDURE NOTES
Peripheral Block      Patient location during procedure: pre-op  Reason for block: at surgeon's request and post-op pain management  Performed by  Anesthesiologist: Patric Carbajal MD  Preanesthetic Checklist  Completed: patient identified, IV checked, site marked, risks and benefits discussed, surgical consent, monitors and equipment checked, pre-op evaluation and timeout performed  Prep:  Pt Position: sitting  Sterile barriers:cap, gloves, sterile barriers and mask  Prep: ChloraPrep  Patient monitoring: blood pressure monitoring, continuous pulse oximetry and EKG  Procedure    Sedation: yes    Guidance:ultrasound guided    ULTRASOUND INTERPRETATION.  Using ultrasound guidance a 21 G gauge needle was placed in close proximity to the femoral nerve, at which point, under ultrasound guidance anesthetic was injected in the area of the nerve and spread of the anesthesia was seen on ultrasound in close proximity thereto.  There were no abnormalities seen on ultrasound; a digital image was taken; and the patient tolerated the procedure with no complications. Images:still images obtained, printed/placed on chart    Laterality:right  Block Type:adductor canal block  Injection Technique:single-shot  Needle Type:echogenic  Needle Gauge:21 G  Resistance on Injection: none    Medications Used: bupivacaine (PF) (MARCAINE) 0.5 % injection, 30 mL  dexamethasone (DECADRON) injection, 4 mg      Post Assessment  Patient Tolerance:comfortable throughout block  Complications:no

## 2022-09-08 NOTE — H&P
Orthopaedic Surgery History and Physical    Patient Name:  Funmilayo Concepcion  YOB: 1970  Age: 52 y.o.  Medical Records Number:  [id number]    Date of Admission:  9/8/2022 10:11 AM    Chief Complaint:  Aseptic loosening of prosthetic knee (HCC) [T84.038A, Z96.659]    Funmilayo Concepcion is a 52 y.o. female who presents c/o severe right knee pain.  The pain has been on and off since her tka over a year ago.  Her pain is worsening to the point where the pain is becoming disabling.  The pain is a constant dull ache with occasional sharp, stabbing pain.  Her infection work up was negative, however her allergy testing was positive for nickel and chromium, but negative for titanium. The patient has failed conservative treatment and would like to proceed with right total knee arthroplasty.    /97 (BP Location: Right arm)   Pulse (!) 132   Temp 98.6 °F (37 °C) (Oral)   Resp 18   SpO2 99%     Past Medical History:    Past Medical History:   Diagnosis Date   • Arthritis    • Hypertension    • Right knee pain        Past Surgical History:   Past Surgical History:   Procedure Laterality Date   • ACHILLES TENDON REPAIR Left     X2   • FOOT SURGERY Right     HEEL SPURS   • HYSTERECTOMY     • JOINT REPLACEMENT Right     KNEE ARTHROPLASTY   • KNEE ACL RECONSTRUCTION  07/2016       Social History:    Social History     Socioeconomic History   • Marital status: Single   Tobacco Use   • Smoking status: Never Smoker   • Smokeless tobacco: Never Used   Vaping Use   • Vaping Use: Never used   Substance and Sexual Activity   • Alcohol use: No   • Drug use: No   • Sexual activity: Defer       Family History:    Family History   Problem Relation Age of Onset   • Hypertension Other    • Diabetes Other    • Throat cancer Other    • Lung cancer Other    • Ovarian cancer Other    • Prostate cancer Other    • Malig Hyperthermia Neg Hx        Current Medications:  Scheduled Meds:acetaminophen, 1,000 mg, Oral, Once  ceFAZolin, 2  g, Intravenous, Once  ceFAZolin, 2 g, Intravenous, Once  celecoxib, 200 mg, Oral, Once  fentaNYL citrate (PF), 50 mcg, Intravenous, Once  midazolam, 2 mg, Intravenous, Once  Orthopedic surgery custom cocktail, , Injection, Once  sodium chloride, 10 mL, Intravenous, Q12H      Continuous Infusions:lactated ringers, 9 mL/hr      PRN Meds:.lactated ringers  •  midazolam  •  sodium chloride    Current Facility-Administered Medications:   •  acetaminophen (TYLENOL) tablet 1,000 mg, 1,000 mg, Oral, Once, Kevin Green MD  •  ceFAZolin in dextrose (ANCEF) IVPB solution 2 g, 2 g, Intravenous, Once, Kevin Green MD  •  ceFAZolin in dextrose (ANCEF) IVPB solution 2 g, 2 g, Intravenous, Once, Kevin Green MD  •  celecoxib (CeleBREX) capsule 200 mg, 200 mg, Oral, Once, Kevin Green MD  •  fentaNYL citrate (PF) (SUBLIMAZE) injection 50 mcg, 50 mcg, Intravenous, Once, Patric Carbajal MD  •  lactated ringers infusion, 9 mL/hr, Intravenous, Continuous PRN, Patric Carbajal MD  •  midazolam (VERSED) injection 1 mg, 1 mg, Intravenous, Q10 Min PRN, Patric Carbajal MD  •  midazolam (VERSED) injection 2 mg, 2 mg, Intravenous, Once, Patric Carbajal MD  •  ropivacaine 0.5 % 50 mL, Morphine 5 mg, ketorolac 30 mg, EPINEPHrine 1 mg/mL 0.3 mg in sodium chloride 0.9 % 50 mL solution, , Injection, Once, Kevin Green MD  •  sodium chloride 0.9 % flush 10 mL, 10 mL, Intravenous, Q12H, Patric Carbajal MD  •  sodium chloride 0.9 % flush 10 mL, 10 mL, Intravenous, PRN, Patric Carbajal MD    Allergies:    Allergies   Allergen Reactions   • Nickel Itching     PAIN       Review of Systems:   HEENT: Patient denies any headaches, vision changes, change in hearing, or tinnitus, Patient denies any rhinorrhea,epistaxis, sinus pain, mouth or dental problems, sore throat or hoarseness, or dysphagia  Pulmonary: Patient denies any cough, congestion, SOA, or wheezing  Cardiovascular: Patient denies any chest pain, dyspnea, palpitations, weakness,  intolerance of exercise, varicosities, swelling of extremities, known murmur  Gastrointestinal:  Patient denies nausea, vomiting, diarrhea, constipation, loss  of appetite, change in appetite, dysphagia, gas, heartburn, melena, change in bowel habits, use of laxatives or other drugs to alter the function of the gastrointestinal tract.  Genital/Urinary: Patient denies dysuria, change in color of urine, change in frequency of urination, pain with urgency, incontinence, retention, or nocturia.  Musculoskeletal: Patient denies increased warmth; redness; or swelling of joints; limitation of function; deformity; crepitation: pain in a joint or an extremity, the neck, or the back, especially with movement.  Neurological: Patient denies dizziness, tremor, ataxia, difficulty in speaking, change in speech, paresthesia, loss of sensation, seizures, syncope, changes in memory.  Endocrine system: Patient denies tremors, palpitations, intolerance of heat or cold, polyuria, polydipsia, polyphagia, diaphoresis, exophthalmos, or goiter.  Psychological: Patient denies thoughts/plans or harming self or other; depression,  insomnia, night terrors, radha, memory loss, disorientation.  Skin: Patient denies any bruising, rashes, discoloration, pruritus, wounds, ulcers, decubiti, changes in the hair or nails  Hematopoietic: Patient denies history of spontaneous or excessive bleeding, epistaxis, hematuria, melena, fatigue, enlarged or tender lymph nodes, pallor, history of anemia.      Physical Exam:   Awake, A&O x3, affect normal, no acute distress  Ambulating with a limp due to knee pain  Knee ROM is limited due to pain (5-115)  Strength is 4/5 in the quad, hamstring and calf  Cap refill is normal, Sensation intact    Card:  RR, HD Stable  Pulm:  Regular breathing, no S.O.A  Abd:  Soft, NT, ND    Lab Results (last 24 hours)     ** No results found for the last 24 hours. **          XR Chest PA & Lateral, XR Knee 1 or 2 View  Right    Result Date: 8/30/2022  Narrative: XR CHEST PA AND LATERAL-, XR KNEE 1 OR 2 VW RIGHT-  08/30/2022  HISTORY: Preop. Knee replacement. Hypertension.  PA AND LATERAL CHEST  Heart size is within normal limits. Lungs appear free of acute infiltrates. Bones and soft tissues are unremarkable.      Impression: No acute process.  RIGHT KNEE 2 VIEWS  Distal femoral and proximal tibia components of the right knee prosthesis are well-seated. There is some patellofemoral joint space narrowing. Surgical screws are seen in the distal femur and proximal tibia possibly from an old cruciate ligament repair.  No acute fractures or dislocations are seen.  IMPRESSION: 1. Postoperative changes of the right knee as described. 2. No acute bony abnormality is seen.  This report was finalized on 8/30/2022 3:39 PM by Dr. Raymond Lowe M.D.        Assessment:  End-stage Primary Right Knee Osteoarthritis    Plan:  Patient's pain is becoming disabling, despite extensive conservative treatment.  Radiographs reveal good implant position and alignment.  Allergy testing was positive for nickel and chromium, but negative for titanium.  The risks of surgery, including, but not limited to, heart attack, stroke, dying, DVT, nerve injury, vascular injury, arthrofibrosis and infection were discussed.  The alternatives and benefits were also discussed.  All questions answered and the patient wishes to proceed with right total knee arthroplasty revision    Kevin Muñoz PA-C  [facility]  [address] [place] [address]  [phone]    9/8/2022    CC: Peter Warren MD, Kevin Green MD

## 2022-09-08 NOTE — DISCHARGE SUMMARY
Orthopaedic Surgery Discharge Summary    Patient Name:  Funmilayo Concepcion  YOB: 1970  Age: 52 y.o.  Medical Records Number:  [id number]    Date of Admission:  9/8/2022  Date of Discharge:  9/9/2022    Primary Discharge Diagnosis:  Aseptic loosening of prosthetic knee (HCC) [T84.038A, Z96.659]    Secondary Discharge Diagnosis:    Problems Addressed this Visit        Musculoskeletal and Injuries    Aseptic loosening of prosthetic knee (HCC) - Primary    Relevant Medications    oxyCODONE-acetaminophen (PERCOCET) 5-325 MG per tablet      Diagnoses       Codes Comments    Mechanical loosening of prosthetic knee, initial encounter (HCC)    -  Primary ICD-10-CM: T84.038A, Z96.659  ICD-9-CM: 996.41, V43.65           Procedures Performed:  Right Total Knee Arthroplasty      Hospital Course:    Funmilayo Concepcion is a 52 y.o.  female who underwent successful right revision tka on 9/8/2022.  Funmilayo Concepcion was started on Aspirin 325 mg po twice daily post-operatively for DVT prophylaxis.  On post-op day 1 the patients dressing was changed and their incision was clean, with no signs of infection and their calf was soft, with no signs of DVT.  The patient progressed well with physical therapy and the patients hemoglobin remained stable. On post-operative day 1 the patient was felt ready for discharge.     Vitals:  Vitals:    09/08/22 1710 09/08/22 1715 09/08/22 1730 09/08/22 1745   BP:  127/80 130/79 113/71   BP Location:   Left arm Left arm   Patient Position:   Lying Lying   Pulse: 68 70 92 68   Resp:  16 14 14   Temp:       TempSrc:       SpO2: 100% 100% 99% 100%       Discharge Medications:      Discharge Medications      New Medications      Instructions Start Date   aspirin 325 MG tablet  Commonly known as: Javier Aspirin   325 mg, Oral, 2 Times Daily With Meals      docusate sodium 250 MG capsule  Commonly known as: COLACE   250 mg, Oral, 2 Times Daily PRN      oxyCODONE-acetaminophen 5-325 MG per  tablet  Commonly known as: PERCOCET   1-2 tablets, Oral, Every 4 Hours PRN         Continue These Medications      Instructions Start Date   acetaminophen 500 MG tablet  Commonly known as: TYLENOL   500 mg, Oral, Every 6 Hours PRN      amLODIPine 5 MG tablet  Commonly known as: NORVASC   5 mg, Oral, Nightly      hydroCHLOROthiazide 25 MG tablet  Commonly known as: HYDRODIURIL   12.5 mg, Oral, Nightly             Pain Medications:  Percocet 5/325 mg 1-2 po q 4-6 hours prn pain    DVT Prophylaxis:  Enteric Coated Aspirin 325 mg po twice daily for 2 weeks, then one daily for 4 weeks      Total Knee Joint Replacement Discharge Instructions:    I. ACTIVITIES:  1. Exercises:  ? Complete exercise program as taught by the hospital physical therapist 2 times per day  ? Exercise program will be advanced by the physical therapist  ? During the day be up ambulating every 2 hours (while awake) for short distances  ? Complete the ankle pump exercises at least 10 times per hour (while awake)  ? Elevate legs most of the day the first week post operatively and thereafter elevate legs when in bed and for at least 30 minutes during the day. Caution must be taken to avoid pillow placement under the bend of the knee as this can led to flexion contractures of the knee.  ? Use cold packs 20-30 minutes approximately 5 times per day. This should be done before and after completing your exercises and at any time you are experiencing pain/ stiffness in your operative extremity.      2. Activities of Daily Living:  ? No tub baths, hot tubs, or swimming pools for 4 weeks  ? May shower and let water run over the incision on post-operative day #5 if no drainage. Do not scrub or rub the incision. Simply let the water run over the incision and pat dry.    II. Restrictions  ? Do not cross legs or kneel  ? Your surgeon will discuss with you when you will be able to drive again.  ? Weight bearing is as tolerated  ? First week stay inside on even  terrain. May go up and down stairs one stair at a time utilizing the hand rail  ? After one week, you may venture outside.    III. Precautions:  ? Everyone that comes near you should wash their hands  ? No elective dental, genital-urinary, or colon procedures or surgical procedures for 12 weeks after surgery unless absolutely necessary.  ?  If dental work or surgical procedure is deemed absolutely necessary, you will need to contact your surgeon as you will need to take antibiotics 1 hour prior to any dental work (including teeth cleanings).  ? Please discuss with your surgeon prophylactic antibiotics as the length of time this intervention will be necessary for you varies with each patient’s health history and situation.  ? Avoid sick people. If you must be around someone who is ill, they should wear a mask.  ? Avoid visits to the Emergency Room or Urgent Care unless you are having a life threatening event.   ? If ordered stockings are to be placed on in the morning and removed at night. Monitor the stockings to ensure that any swelling is not causing the stockings to become too tight. In this case, remove stockings immediately.    IV. INCISION CARE:  ? Wash your hands prior to dressing changes  ? Change the dressing as needed to keep incision clean and dry. Utilize dry gauze and paper tape. Avoid touching the side of the gauze that goes against the incision with your hands.  ? No creams or ointments to the incision  ? May remove dressing once the incision is free of drainage  ? Do not touch or pick at the incision  ? Check incision every day and notify surgeon immediately if any of the following signs or symptoms are noted:  o Increase in redness  o Increase in swelling around the incision and of the entire extremity  o Increase in pain  o Drainage oozing from the incision  o Pulling apart of the edges of the incision  o Increase in overall body temperature (greater than 100.5 degrees)  ? Your surgeon will  instruct you regarding suture or staple removal    V. Medications:   1. Anticoagulants: You will be discharged on an anticoagulant. This is a prophylactic medication that helps prevent blood clots during your post-operative period. The type and length of dosage varies based on your individual needs, procedure performed, and surgeon’s preference.  ? While taking the anticoagulant, you should avoid taking any additional aspirin, ibuprofen (Advil or Motrin), Aleve (Naprosyn) or other non-steroidal anti-inflammatory medications.   ? Notify surgeon immediately if any chace bleeding is noted in the urine, stool, emesis, or from the nose or the incision. Blood in the stool will often appear as black rather than red. Blood in urine may appear as pink. Blood in emesis may appear as brown/black like coffee grounds.  ? You will need to apply pressure for longer periods of time to any cuts or abrasions to stop bleeding  ? Avoid alcohol while taking anticoagulants    2. Stool Softeners: You will be at greater risk of constipation after surgery due to being less mobile and the pain medications.   ? Take stool softeners as instructed by your surgeon while on pain medications. Over the counter Colace 100 mg 1-2 capsules twice daily.   ? If stools become too loose or too frequent, please decreases the dosage or stop the stool softener.  ? If constipation occurs despite use of stool softeners, you are to continue the stool softeners and add a laxative (Milk of Magnesia 1 ounce daily as needed)  ? Drink plenty of fluids, and eat fruits and vegetables during your recovery time    3. Pain Medications utilized after surgery are narcotics and the law requires that the following information be given to all patients that are prescribed narcotics:  ? CLASSIFICATION: Pain medications are called Opioids and are narcotics  ? LEGALITIES: It is illegal to share narcotics with others and to drive within 24 hours of taking narcotics  ? POTENTIAL  SIDE EFFECTS: Potential side effects of opioids include: nausea, vomiting, itching, dizziness, drowsiness, dry mouth, constipation, and difficulty urinating.  ? POTENTIAL ADVERSE EFFECTS:   o Opioid tolerance can develop with use of pain medications and this simply means that it requires more and more of the medication to control pain; however, this is seen more in patients that use opioids for longer periods of time.  o Opioid dependence can develop with use of Opioids and this simply means that to stop the medication can cause withdrawal symptoms; however, this is seen with patients that use Opioids for longer periods of time.  o Opioid addiction can develop with use of Opioids and the incidence of this is very unlikely in patients who take the medications as ordered and stop the medications as instructed.  o Opioid overdose can be dangerous, but is unlikely when the medication is taken as ordered and stopped when ordered. It is important not to mix opioids with alcohol or with and type of sedative such as Benadryl as this can lead to over sedation and respiratory difficulty.  ? DOSAGE:   o Pain medications will need to be taken consistently for the first week to decrease pain and promote adequate pain relief and participation in physical therapy.  o After the initial surgical pain begins to resolve, you may begin to decrease the pain medication. By the end of 6-8 weeks, you should be off of pain medications.  o Refills will not be given by the office during evening hours, on weekends, or after 6-8 weeks post-op.  o To seek refills on pain medications during the initial 6 week post-operative period, you must call the office 48 hours in advance to request the refill. The office will then notify you when to  the prescription. DO NOT wait until you are out of the medication to request a refill.    V. FOLLOW-UP VISITS:  ? You will need to follow up in the office with your surgeon in 3 weeks. Please call this  number [phone] to schedule this appointment.  If you have any concerns or suspected complications prior to your follow up visit, please call your surgeons office. Do not wait until your appointment time if you suspect complications. These will need to be addressed in the office promptly.    Kevin Muñoz PA-C  [facility]  [address] [place] [id number]  [phone]    9/8/2022    CC:Peter Warren MD:Kevin Green MD

## 2022-09-08 NOTE — ANESTHESIA PREPROCEDURE EVALUATION
Anesthesia Evaluation     Patient summary reviewed   NPO Solid Status: > 6 hours  NPO Liquid Status: > 2 hours           Airway   Mallampati: II  TM distance: >3 FB  Neck ROM: full  Dental          Pulmonary     breath sounds clear to auscultation  (-) COPD, asthma, sleep apnea, not a smoker  Cardiovascular     Rhythm: regular  Rate: normal    (+) hypertension,   (-) dysrhythmias, angina, CHF      Neuro/Psych  (-) seizures, CVA  GI/Hepatic/Renal/Endo    (+) obesity,     (-) GERD, liver disease, no renal disease, diabetes, no thyroid disorder    Musculoskeletal     Abdominal    Substance History      OB/GYN          Other                      Anesthesia Plan    ASA 2     general with block       Anesthetic plan, risks, benefits, and alternatives have been provided, discussed and informed consent has been obtained with: patient.        CODE STATUS:

## 2022-09-09 VITALS
RESPIRATION RATE: 16 BRPM | OXYGEN SATURATION: 94 % | SYSTOLIC BLOOD PRESSURE: 127 MMHG | TEMPERATURE: 96.6 F | HEART RATE: 67 BPM | DIASTOLIC BLOOD PRESSURE: 81 MMHG

## 2022-09-09 PROBLEM — T84.038A MECHANICAL LOOSENING OF PROSTHETIC KNEE, INITIAL ENCOUNTER (HCC): Status: ACTIVE | Noted: 2022-09-09

## 2022-09-09 PROBLEM — Z96.659 MECHANICAL LOOSENING OF PROSTHETIC KNEE, INITIAL ENCOUNTER (HCC): Status: ACTIVE | Noted: 2022-09-09

## 2022-09-09 LAB
ANION GAP SERPL CALCULATED.3IONS-SCNC: 11.2 MMOL/L (ref 5–15)
BUN SERPL-MCNC: 11 MG/DL (ref 6–20)
BUN/CREAT SERPL: 11.2 (ref 7–25)
CALCIUM SPEC-SCNC: 8.6 MG/DL (ref 8.6–10.5)
CHLORIDE SERPL-SCNC: 104 MMOL/L (ref 98–107)
CO2 SERPL-SCNC: 20.8 MMOL/L (ref 22–29)
CREAT SERPL-MCNC: 0.98 MG/DL (ref 0.57–1)
DEPRECATED RDW RBC AUTO: 40.9 FL (ref 37–54)
EGFRCR SERPLBLD CKD-EPI 2021: 69.2 ML/MIN/1.73
ERYTHROCYTE [DISTWIDTH] IN BLOOD BY AUTOMATED COUNT: 12.2 % (ref 12.3–15.4)
GLUCOSE SERPL-MCNC: 111 MG/DL (ref 65–99)
HCT VFR BLD AUTO: 37.2 % (ref 34–46.6)
HGB BLD-MCNC: 12.8 G/DL (ref 12–15.9)
MCH RBC QN AUTO: 31.1 PG (ref 26.6–33)
MCHC RBC AUTO-ENTMCNC: 34.4 G/DL (ref 31.5–35.7)
MCV RBC AUTO: 90.5 FL (ref 79–97)
PLATELET # BLD AUTO: 294 10*3/MM3 (ref 140–450)
PMV BLD AUTO: 9.6 FL (ref 6–12)
POTASSIUM SERPL-SCNC: 4.7 MMOL/L (ref 3.5–5.2)
RBC # BLD AUTO: 4.11 10*6/MM3 (ref 3.77–5.28)
SODIUM SERPL-SCNC: 136 MMOL/L (ref 136–145)
WBC NRBC COR # BLD: 10.55 10*3/MM3 (ref 3.4–10.8)

## 2022-09-09 PROCEDURE — 97161 PT EVAL LOW COMPLEX 20 MIN: CPT

## 2022-09-09 PROCEDURE — 85027 COMPLETE CBC AUTOMATED: CPT | Performed by: PHYSICIAN ASSISTANT

## 2022-09-09 PROCEDURE — 97116 GAIT TRAINING THERAPY: CPT

## 2022-09-09 PROCEDURE — 80048 BASIC METABOLIC PNL TOTAL CA: CPT | Performed by: PHYSICIAN ASSISTANT

## 2022-09-09 RX ADMIN — Medication 10 ML: at 09:58

## 2022-09-09 RX ADMIN — OXYCODONE AND ACETAMINOPHEN 2 TABLET: 5; 325 TABLET ORAL at 05:17

## 2022-09-09 RX ADMIN — OXYCODONE AND ACETAMINOPHEN 2 TABLET: 5; 325 TABLET ORAL at 09:55

## 2022-09-09 RX ADMIN — FERROUS SULFATE TAB 325 MG (65 MG ELEMENTAL FE) 325 MG: 325 (65 FE) TAB at 09:55

## 2022-09-09 RX ADMIN — MUPIROCIN 1 APPLICATION: 20 OINTMENT TOPICAL at 09:58

## 2022-09-09 RX ADMIN — ASPIRIN 325 MG: 325 TABLET, COATED ORAL at 09:55

## 2022-09-09 RX ADMIN — CLINDAMYCIN PHOSPHATE 900 MG: 900 INJECTION, SOLUTION INTRAVENOUS at 05:18

## 2022-09-09 NOTE — PLAN OF CARE
Goal Outcome Evaluation:  Plan of Care Reviewed With: patient, mother           Outcome Evaluation: Pt is a 53 y/o F POD #1 s/p R TKAR. Pt reports she is I with mobility prior to admission with no AD, lives alone, and has 3 GILLIAN with 1 HR. Pt reports 8/10 pain but agreeable to PT eval. Pt presents to PT with post-op pain, limited ROM and decreased strength. Pt completed bed mobility and STS transfers this visit with supervision. Pt ambulated 150' c RW requiring SBA and 3 steps requiring CGA. Pt completed TKA protocol and PT reviewed body mechanics and stair navigation. PT recommends home with assist and HHPT. Pt plans to D/C today. Pt will benefit from skilled PT to address strength, ROM, and functional mobility.

## 2022-09-09 NOTE — PLAN OF CARE
Goal Outcome Evaluation:      VSS- Pt still sleepy from anesthesia but is oriented. POD 0 of a Rt total knee revision. Dressing C/D/I. Neurovascular checks wdl. Pt is not complaining of nausea or vomiting at this time.

## 2022-09-09 NOTE — PLAN OF CARE
Goal Outcome Evaluation:            VSS- Pt alert and oriented. POD of a Rt. Total knee revision. Pt ambulated with walker. Dressing C/D/I. Neurovascular checks wdl. Pt eating, drinking and voiding well.

## 2022-09-09 NOTE — PROGRESS NOTES
Continued Stay Note  [facility]     Patient Name: Funmilayo Concepcion  MRN: [id number]  Today's Date: 9/9/2022    Admit Date: 9/8/2022     Discharge Plan     Row Name 09/09/22 1514       Plan    Plan [facility]    Patient/Family in Agreement with Plan yes    Plan Comments Spoke with pt, verified correct information on facesheet and explained the role of CCP. Pt would like to d/c home with [facility], referral given to [facility] who states they are able to accept. Plan will be home with HH and family support, no other needs identified at this time.    Final Discharge Disposition Code 06 - home with home health care    Final Note [facility]                Discharge Codes    No documentation.               Expected Discharge Date and Time     Expected Discharge Date Expected Discharge Time    Sep 9, 2022             Martine Lofton RN

## 2022-09-09 NOTE — PLAN OF CARE
Goal Outcome Evaluation:           Progress: no change  Outcome Evaluation: Pt complains of pain rating 9-10 on pain scale , pain medication administered and pt noted to be resting comforably in between recieving pain medication. Pt states she would like to wait to get up to the bathroom with PT in the AM. Pt voiding via purewick throughout the night. VSS at this time, will continue to monitor.

## 2022-09-09 NOTE — PROGRESS NOTES
Orthopaedic Surgery  Progress Note  9/9/2022    Patients Name:  Funmilayo Concepcion  YOB: 1970  Age: 52 y.o.  Medical Records Number:  [id number]  Date of Admission: 9/8/2022    No complaints except appropriate pain and stiffness in the right knee    Vitals:  Vitals:    09/08/22 1845 09/08/22 2252 09/09/22 0237 09/09/22 0526   BP: 135/84 119/76 139/85 149/89   BP Location:  Left arm Left arm Left arm   Patient Position:  Lying Lying Lying   Pulse: 72 85 80 72   Resp: 16 16 16 16   Temp: 97.3 °F (36.3 °C) 96.6 °F (35.9 °C) 96.1 °F (35.6 °C) 96.6 °F (35.9 °C)   TempSrc: Oral Oral Oral Oral   SpO2: 100% 96% 96% 98%       RLE:  NVI, calf nontender, sensation intact  No signs of DVT    Incision: clean, no signs of infection    Lab Results (last 24 hours)     Procedure Component Value Units Date/Time    CBC (No Diff) [[id number]]  (Abnormal) Collected: 09/09/22 0501    Specimen: Blood Updated: 09/09/22 0558     WBC 10.55 10*3/mm3      RBC 4.11 10*6/mm3      Hemoglobin 12.8 g/dL      Hematocrit 37.2 %      MCV 90.5 fL      MCH 31.1 pg      MCHC 34.4 g/dL      RDW 12.2 %      RDW-SD 40.9 fl      MPV 9.6 fL      Platelets 294 10*3/mm3     Basic Metabolic Panel [[id number]] Updated: 09/09/22 0548    Specimen: Blood           XR Knee 1 or 2 View Right    Result Date: 9/8/2022  Narrative: XR KNEE 1 OR 2 VW RIGHT-  INDICATIONS: Postoperative evaluation.  TECHNIQUE: Frontal and lateral views of the right knee  COMPARISON: 08/30/2022  FINDINGS:   Intact appearing knee arthroplasty hardware is seen with adjacent surgical soft tissue gas, overlying skin staples. No acute fracture is identified.       Impression:  Postsurgical changes.    This report was finalized on 9/8/2022 4:39 PM by Dr. Zane Bonilla M.D.      XR Chest PA & Lateral, XR Knee 1 or 2 View Right    Result Date: 8/30/2022  Narrative: XR CHEST PA AND LATERAL-, XR KNEE 1 OR 2 VW RIGHT-  08/30/2022  HISTORY: Preop. Knee replacement. Hypertension.  PA AND  LATERAL CHEST  Heart size is within normal limits. Lungs appear free of acute infiltrates. Bones and soft tissues are unremarkable.      Impression: No acute process.  RIGHT KNEE 2 VIEWS  Distal femoral and proximal tibia components of the right knee prosthesis are well-seated. There is some patellofemoral joint space narrowing. Surgical screws are seen in the distal femur and proximal tibia possibly from an old cruciate ligament repair.  No acute fractures or dislocations are seen.  IMPRESSION: 1. Postoperative changes of the right knee as described. 2. No acute bony abnormality is seen.  This report was finalized on 8/30/2022 3:39 PM by Dr. Raymond Lowe M.D.      Peripheral Block    Result Date: 9/8/2022  Narrative: Patric Carbajal MD     9/8/2022  1:00 PM Peripheral Block Patient location during procedure: pre-op Reason for block: at surgeon's request and post-op pain management Performed by Anesthesiologist: Patric Carbajal MD Preanesthetic Checklist Completed: patient identified, IV checked, site marked, risks and benefits discussed, surgical consent, monitors and equipment checked, pre-op evaluation and timeout performed Prep: Pt Position: sitting Sterile barriers:cap, gloves, sterile barriers and mask Prep: ChloraPrep Patient monitoring: blood pressure monitoring, continuous pulse oximetry and EKG Procedure Sedation: yes Guidance:ultrasound guided ULTRASOUND INTERPRETATION.  Using ultrasound guidance a 21 G gauge needle was placed in close proximity to the femoral nerve, at which point, under ultrasound guidance anesthetic was injected in the area of the nerve and spread of the anesthesia was seen on ultrasound in close proximity thereto.  There were no abnormalities seen on ultrasound; a digital image was taken; and the patient tolerated the procedure with no complications. Images:still images obtained, printed/placed on chart Laterality:right Block Type:adductor canal block Injection  Technique:single-shot Needle Type:echogenic Needle Gauge:21 G Resistance on Injection: none Medications Used: bupivacaine (PF) (MARCAINE) 0.5 % injection, 30 mL dexamethasone (DECADRON) injection, 4 mg Post Assessment Patient Tolerance:comfortable throughout block Complications:no       Assesment/Plan:    Procedures:  Right TKA Revision  Postoperative Day: 1  Weightbearing Status:  WBAT with walker  DVT Prophylaxis:  ASA for DVT prophylaxis    Disposition:  Home with home health after PT today, if comfortable and mobilizing safely    Kevin Muñoz  [facility]  [address] [place] [address]  [phone]    9/9/2022

## 2022-09-09 NOTE — OP NOTE
Orthopaedic Surgery Operative Note    Patient Name:  Funmilayo Concepcion  YOB: 1970  Age: 52 y.o.  Medical Records Number:  [id number]    Date of Procedure:  9/8/2022    Pre-operative Diagnosis:  Aseptic Loosening/Allergic Reaction Right Total Knee Arthroplasty    Post-operative Diagnosis:  Aseptic Loosening/Allergic Reaction Right Total Knee Arthroplasty    Procedure Performed:  Right Total Knee Arthroplasty Revision- Femoral Component    Implants:  Biomet Vanguard TKA system,  60 All Titanium Femoral Component, 67 Tibial Tray, 31 3-Peg Patella, 12 Posterior Lipped E1 Polyethylene Insert    Surgeon:  Kevin Green M.D.    Assistant: Ilsa Muñoz/Britt Reyez (who was present during the critical portions of the case, thereby decreasing operative time and patient morbidity)    Anesthetic Type:  General    Estimated Blood Loss:  250cc's    Specimens:   Order Name Source Comment Collection Info Order Time   BASIC METABOLIC PANEL    9/8/2022 10:06 PM   CBC (NO DIFF)   Collected By: Key Verduzco 9/8/2022 10:06 PM       No Complications      Indications for Procedure:  Funmilayo Concepcion is a 52 y.o. female suffering from an allergic reaction to her right total knee arthroplasty.  The patients pain is becoming disabling, despite extensive conservative care, including NSAIDS, therapy and bracing.  Infection work up is negative and allergy testing was positive for nickel and chromium, but negative for titanium. The risks, benefits and alternatives were discussed and the patient wishes to proceed with right total knee arthroplasty revision of all components.      Procedure Performed:    After informed consent was obtained, the correct patient identified, the correct operative side marked by the operative surgeon and pre-operative IV antibiotics given (prior to tourniquet inflation,) the patient was taken to the operating room and placed supine on the operating table.  After general anesthesia was induced, a  surgical time out was performed and 1 gm of Tranxemic Acid given, a well padded tourniquet was placed and the patient's right lower extremity was prepped with ChloraPrep and draped in a sterile fashion.    A midline incision was made overlying the right knee and we sharply dissected down to expose the parapatellar retinaculum.  A muscle sparing, mid-vastus parapatellar arthrotomy was performed and we elevated the soft tissue both medially and laterally.  The medial and lateal gutters were reestablished.  We everted the patella and cleared all soft tissue surrounding the patellar component.  We thoroughly inspected the implant/cement/bone interfaces and there were no signs of implant loosening.  We protected the patella with the patella protector and turned our attention to the femur and the tibia.    We gained exposure of the femoral and tibial components and there were no gross signs of tibial loosening. Using a microsagittal saw and Joana osteotomes, we carefully removed the femoral component.  Care was taken to remove the implants with as little bone loss as possible.  We then removed all soft tissue debris from the bony surfaces and copiously irrigated the knee.  We sized the femur to be a 60, and using the four in one cut block, we freshened up our anterior/posterior/anterior and posterior chamfer cuts.  We were able to preserve excellent bone stock, so we trialed with a 60 mm femoral component and had excellent fit.  We then removed soft tissue and bony debris from the posterior aspect of the knee.    We placed our trial implants and had excellent fit, range of motion, stability and ligament balance, throughout a complete arc of motion with a 12 lipped polyethylene liner.  We removed our trial implants, copiously irrigated the knee, then cemented/impacted our implants in place using Biomet cement.  Once the cement cured, we trialed again with the 10, 12 and 14 mm AS, std and lipped polyethylene liners.  The 12  "lipped gave us the best range of motion, stability and ligament balance, throughout a complete arc of motion.  We removed the trials, copiously irrigated the knee, gave IV antibiotics and local anesthetic, then placed our permanent polyethylene liner.  We placed a 1/8\" hemovac drain, then closed the arthrotomy with #1 Vicryl pop-off sutures.  The subcutaneous tissue was closed with 2-0 vicryl pop-off sutures.  The skin was closed with staples.  We placed a sterile dressing of Xeroform, 4x4's, abdominal pads, cast padding and an Ace Wrap.  The patient was then awakened from general anesthesia and taken to the recovery room in stable condition.    The patient will be started on Aspirin 325 mg twice daily for DVT prophylaxis.  IV antibiotics will be discontinued within 24 hours of surgery.  Immediately prior to surgery, there were no acute Thromboembolic nor Cardiovascular risk factors.  An updated Medical Reconciliation form is on the chart.    Kevin Muñoz PA-C  [facility]  [address] [place], [place] [address] [phone]    9/9/2022  "

## 2022-09-09 NOTE — DISCHARGE PLACEMENT REQUEST
"Kasia Toribio (52 y.o. Female)             Date of Birth   1970    Social Security Number       Address   [address] [place] [address]    Home Phone   [phone]    MRN   [id number]       Amish   Non-Mandaen    Marital Status   Single                            Admission Date   9/8/22    Admission Type   Elective    Admitting Provider   Kevin Green MD    Attending Provider   Kevin Green MD    Department, Room/Bed   [address], \[facility]\""/       Discharge Date       Discharge Disposition   Home or Self Care    Discharge Destination                               Attending Provider: Kevin Green MD    Allergies: Nickel    Isolation: None   Infection: None   Code Status: CPR   Advance Care Planning Activity    Ht: 166.4 cm (65.5\")   Wt: 87.8 kg (193 lb 9.6 oz)    Admission Cmt: None   Principal Problem: None                Active Insurance as of 9/8/2022     Primary Coverage     Payor Plan Insurance Group Employer/Plan Group    [facility] CCN OPTUM      Payor Plan Address Payor Plan Phone Number Payor Plan Fax Number Effective Dates    [address] [phone]  8/26/2020 - None Entered    [facility] [id number]       Subscriber Name Subscriber Birth Date Member ID       KASIA TORIBIO 1970 [id number]                 Emergency Contacts      (Rel.) Home Phone Work Phone Mobile Phone    CRISTÓBAL TORIBIO -- -- [phone]        "

## 2022-09-09 NOTE — THERAPY EVALUATION
Patient Name: Funmilayo Concepcion  : 1970    MRN: [id number]                              Today's Date: 2022       Admit Date: 2022    Visit Dx:     ICD-10-CM ICD-9-CM   1. Mechanical loosening of prosthetic knee, initial encounter (Formerly [facility])  T84.038A 996.41    Z96.659 V43.65     Patient Active Problem List   Diagnosis   • S/P ACL reconstruction   • Pain and swelling of knee   • Aseptic loosening of prosthetic knee (Formerly [facility])     Past Medical History:   Diagnosis Date   • Arthritis    • Hypertension    • Right knee pain      Past Surgical History:   Procedure Laterality Date   • ACHILLES TENDON REPAIR Left     X2   • FOOT SURGERY Right     HEEL SPURS   • HYSTERECTOMY     • JOINT REPLACEMENT Right     KNEE ARTHROPLASTY   • KNEE ACL RECONSTRUCTION  2016      General Information     Row Name 22          Physical Therapy Time and Intention    Document Type evaluation  -CS     Mode of Treatment individual therapy;physical therapy  -     Row Name 22          General Information    Patient Profile Reviewed yes  -CS     Prior Level of Function independent:;all household mobility;gait;transfer;bed mobility  -CS     Existing Precautions/Restrictions fall;right;weight bearing;other (see comments)  R LE WBAT  -CS     Barriers to Rehab none identified  -CS     Row Name 22          Living Environment    People in Home alone  -CS     Row Name 22          Home Main Entrance    Number of Stairs, Main Entrance three  -CS     Stair Railings, Main Entrance railings safe and in good condition  -CS     Row Name 22          Stairs Within Home, Primary    Number of Stairs, Within Home, Primary none  -CS     Row Name 22          Cognition    Orientation Status (Cognition) oriented x 4  -CS     Row Name 22          Safety Issues, Functional Mobility    Impairments Affecting Function (Mobility) pain;range of motion (ROM);strength  -CS           User Key  (r) =  Recorded By, (t) = Taken By, (c) = Cosigned By    Initials Name Provider Type    CS Yu Walker, PT Physical Therapist               Mobility     Row Name 09/09/22 0941          Bed Mobility    Bed Mobility supine-sit;sit-supine  -CS     Supine-Sit [place] (Bed Mobility) supervision  -CS     Sit-Supine [place] (Bed Mobility) supervision  -CS     Assistive Device (Bed Mobility) head of bed elevated  -CS     Row Name 09/09/22 0941          Sit-Stand Transfer    Sit-Stand [place] (Transfers) supervision  -CS     Assistive Device (Sit-Stand Transfers) other (see comments)  no AD  -CS     Row Name 09/09/22 0941          Gait/Stairs (Locomotion)    [place] Level (Gait) standby assist  -CS     Assistive Device (Gait) walker, front-wheeled  -CS     Distance in Feet (Gait) 250'  -CS     Deviations/Abnormal Patterns (Gait) gait speed decreased;stride length decreased  -CS     [place] Level (Stairs) contact guard;verbal cues  -CS     Assistive Device (Stairs) other (see comments)  HR  -CS     Handrail Location (Stairs) right side (ascending);left side (descending)  -CS     Ascending Technique (Stairs) step-to-step  -CS     Descending Technique (Stairs) step-to-step  -CS     Row Name 09/09/22 0941          Mobility    Extremity Weight-bearing Status right lower extremity  -CS     Right Lower Extremity (Weight-bearing Status) weight-bearing as tolerated (WBAT)  -CS           User Key  (r) = Recorded By, (t) = Taken By, (c) = Cosigned By    Initials Name Provider Type    CS Yu Walker, PT Physical Therapist               Obj/Interventions     Row Name 09/09/22 0942          Range of Motion Comprehensive    General Range of Motion bilateral lower extremity ROM WFL  -CS     Comment, General Range of Motion R LE limited secondary to post-op  -CS     Row Name 09/09/22 0942          Strength Comprehensive (MMT)    General Manual Muscle Testing (MMT) Assessment no strength deficits identified  -CS      Comment, General Manual Muscle Testing (MMT) Assessment R LE limited secondary to post-op  -CS     Row Name 09/09/22 0942          Motor Skills    Therapeutic Exercise --  10 reps R TKA protocol  -     Row Name 09/09/22 0942          Balance    Balance Assessment sitting static balance;standing static balance;standing dynamic balance  -CS     Static Sitting Balance independent  -CS     Position, Sitting Balance unsupported;sitting edge of bed  -CS     Static Standing Balance standby assist  -CS     Dynamic Standing Balance standby assist  -CS     Position/Device Used, Standing Balance walker, front-wheeled  -CS           User Key  (r) = Recorded By, (t) = Taken By, (c) = Cosigned By    Initials Name Provider Type    CS Yu Walker, PT Physical Therapist               Goals/Plan     Row Name 09/09/22 0948          Bed Mobility Goal 1 (PT)    Activity/Assistive Device (Bed Mobility Goal 1, PT) sit to supine;supine to sit  -CS     [place] Level/Cues Needed (Bed Mobility Goal 1, PT) modified independence  -CS     Time Frame (Bed Mobility Goal 1, PT) 1 week  -CS     Row Name 09/09/22 0948          Transfer Goal 1 (PT)    Activity/Assistive Device (Transfer Goal 1, PT) sit-to-stand/stand-to-sit  -CS     [place] Level/Cues Needed (Transfer Goal 1, PT) modified independence  -CS     Time Frame (Transfer Goal 1, PT) 1 week  -     Row Name 09/09/22 0948          Gait Training Goal 1 (PT)    Activity/Assistive Device (Gait Training Goal 1, PT) gait (walking locomotion);assistive device use  -CS     [place] Level (Gait Training Goal 1, PT) modified independence  -CS     Distance (Gait Training Goal 1, PT) 200'  -CS     Time Frame (Gait Training Goal 1, PT) 1 week  -CS           User Key  (r) = Recorded By, (t) = Taken By, (c) = Cosigned By    Initials Name Provider Type    CS Yu Walker, PT Physical Therapist               Clinical Impression     Row Name 09/09/22 0943          Pain    Pretreatment  Pain Rating 8/10  -CS     Posttreatment Pain Rating 8/10  -CS     Pain Intervention(s) Rest;Repositioned;Ambulation/increased activity;Cold applied  -CS     Row Name 09/09/22 0943          Plan of Care Review    Plan of Care Reviewed With patient;mother  -CS     Outcome Evaluation Pt is a 51 y/o F POD #1 s/p R TKAR. Pt reports she is I with mobility prior to admission with no AD, lives alone, and has 3 GILLIAN with 1 HR. Pt reports 8/10 pain but agreeable to PT eval. Pt presents to PT with post-op pain, limited ROM and decreased strength. Pt completed bed mobility and STS transfers this visit with supervision. Pt ambulated 150' c RW requiring SBA and 3 steps requiring CGA. Pt completed TKA protocol and PT reviewed body mechanics and stair navigation. PT recommends home with assist and HHPT. Pt plans to D/C today. Pt will benefit from skilled PT to address strength, ROM, and functional mobility.  -CS     Row Name 09/09/22 0943          Therapy Assessment/Plan (PT)    Patient/Family Therapy Goals Statement (PT) to return home  -CS     Rehab Potential (PT) good, to achieve stated therapy goals  -CS     Criteria for Skilled Interventions Met (PT) yes;meets criteria  -CS     Therapy Frequency (PT) daily  -CS     Row Name 09/09/22 0943          Positioning and Restraints    Pre-Treatment Position in bed  -CS     Post Treatment Position bed  -CS     In Bed supine;call light within reach;encouraged to call for assist;with family/caregiver  -CS           User Key  (r) = Recorded By, (t) = Taken By, (c) = Cosigned By    Initials Name Provider Type    CS Yu Walker, PT Physical Therapist               Outcome Measures     Row Name 09/09/22 0948 09/09/22 0752       How much help from another person do you currently need...    Turning from your back to your side while in flat bed without using bedrails? 4  -CS 3  -JF    Moving from lying on back to sitting on the side of a flat bed without bedrails? 3  -CS 3  -JF    Moving to  and from a bed to a chair (including a wheelchair)? 4  -CS 3  -JF    Standing up from a chair using your arms (e.g., wheelchair, bedside chair)? 4  -CS 3  -JF    Climbing 3-5 steps with a railing? 3  -CS 2  -JF    To walk in hospital room? 4  -CS 2  -JF    AM-PAC 6 Clicks Score (PT) 22  -CS 16  -JF    Highest level of mobility 7 --> Walked 25 feet or more  - 5 --> Static standing  -JF    Row Name 09/09/22 0948          Functional Assessment    Outcome Measure Options AM-PAC 6 Clicks Basic Mobility (PT)  -           User Key  (r) = Recorded By, (t) = Taken By, (c) = Cosigned By    Initials Name Provider Type    Gabi Bennett, RN Registered Nurse    Yu Urrutia, PT Physical Therapist                             Physical Therapy Education                 Title: PT OT SLP Therapies (Done)     Topic: Physical Therapy (Done)     Point: Mobility training (Done)     Learning Progress Summary           Patient Acceptance, E,TB, VU,DU by  at 9/9/2022 0949                   Point: Home exercise program (Done)     Learning Progress Summary           Patient Acceptance, E,TB, VU,DU by  at 9/9/2022 0949                   Point: Body mechanics (Done)     Learning Progress Summary           Patient Acceptance, E,TB, VU,DU by  at 9/9/2022 0949                   Point: Precautions (Done)     Learning Progress Summary           Patient Acceptance, E,TB, VU,DU by  at 9/9/2022 0949                               User Key     Initials Effective Dates Name Provider Type Discipline     07/25/22 -  Yu Walker, PT Physical Therapist PT              PT Recommendation and Plan     Plan of Care Reviewed With: patient, mother  Outcome Evaluation: Pt is a 53 y/o F POD #1 s/p R TKAR. Pt reports she is I with mobility prior to admission with no AD, lives alone, and has 3 GILLIAN with 1 HR. Pt reports 8/10 pain but agreeable to PT eval. Pt presents to PT with post-op pain, limited ROM and decreased strength. Pt completed bed  mobility and STS transfers this visit with supervision. Pt ambulated 150' c RW requiring SBA and 3 steps requiring CGA. Pt completed TKA protocol and PT reviewed body mechanics and stair navigation. PT recommends home with assist and HHPT. Pt plans to D/C today. Pt will benefit from skilled PT to address strength, ROM, and functional mobility.     Time Calculation:    PT Charges     Row Name 09/09/22 0949             Time Calculation    Start Time 0838  -CS      Stop Time 0855  -CS      Time Calculation (min) 17 min  -CS      PT Received On 09/09/22  -CS      PT - Next Appointment 09/10/22  -      PT Goal Re-Cert Due Date 09/16/22  -CS              Time Calculation- PT    Total Timed Code Minutes- PT 15 minute(s)  -CS              Timed Charges    57557 - PT Therapeutic Exercise Minutes 5  -CS      61451 - Gait Training Minutes  10  -CS              Total Minutes    Timed Charges Total Minutes 15  -CS       Total Minutes 15  -CS            User Key  (r) = Recorded By, (t) = Taken By, (c) = Cosigned By    Initials Name Provider Type    CS Clayton Walker, PT Physical Therapist              Therapy Charges for Today     Code Description Service Date Service Provider Modifiers Qty    98490022865 HC GAIT TRAINING EA 15 MIN 9/9/2022 Clayton Walker, PT GP 1    [id number] HC PT EVAL LOW COMPLEXITY 2 9/9/2022 Clayton Walker, PT GP 1          PT G-Codes  Outcome Measure Options: AM-PAC 6 Clicks Basic Mobility (PT)  AM-PAC 6 Clicks Score (PT): 22    CLAYTON WALKER PT  9/9/2022

## 2022-09-30 ENCOUNTER — TRANSCRIBE ORDERS (OUTPATIENT)
Dept: PHYSICAL THERAPY | Facility: HOSPITAL | Age: 52
End: 2022-09-30

## 2022-09-30 DIAGNOSIS — Z96.651 S/P TKR (TOTAL KNEE REPLACEMENT), RIGHT: Primary | ICD-10-CM

## 2022-10-05 ENCOUNTER — HOSPITAL ENCOUNTER (OUTPATIENT)
Dept: PHYSICAL THERAPY | Facility: HOSPITAL | Age: 52
Setting detail: THERAPIES SERIES
Discharge: HOME | End: 2022-10-05

## 2022-10-05 DIAGNOSIS — Z96.651 S/P REVISION OF TOTAL KNEE, RIGHT: Primary | ICD-10-CM

## 2022-10-05 DIAGNOSIS — Z47.89 ORTHOPEDIC AFTERCARE: ICD-10-CM

## 2022-10-05 PROCEDURE — 97110 THERAPEUTIC EXERCISES: CPT

## 2022-10-05 PROCEDURE — 97161 PT EVAL LOW COMPLEX 20 MIN: CPT

## 2022-10-05 NOTE — THERAPY EVALUATION
Outpatient Physical Therapy Ortho Initial Evaluation  [facility]     Patient Name: Funmilayo Concepcion  : 1970  MRN: [id number]  Today's Date: 10/5/2022      Visit Date: 10/05/2022    Patient Active Problem List   Diagnosis   • S/P ACL reconstruction   • Pain and swelling of knee   • Aseptic loosening of prosthetic knee (HCC)   • Mechanical loosening of prosthetic knee, initial encounter ([facility])        Past Medical History:   Diagnosis Date   • Arthritis    • Hypertension    • Right knee pain         Past Surgical History:   Procedure Laterality Date   • ACHILLES TENDON REPAIR Left     X2   • FOOT SURGERY Right     HEEL SPURS   • HYSTERECTOMY     • JOINT REPLACEMENT Right     KNEE ARTHROPLASTY   • KNEE ACL RECONSTRUCTION  2016   • TOTAL KNEE ARTHROPLASTY REVISION Right 2022    Procedure: TOTAL KNEE ARTHROPLASTY REVISION;  Surgeon: Kevin Green MD;  [place] [facility] [place] [facility] [place]  Service: Orthopedics;  Laterality: Right;       Visit Dx:     ICD-10-CM ICD-9-CM   1. S/P revision of total knee, right  Z96.651 V43.65   2. Orthopedic aftercare  Z47.89 V54.9          Patient History     Row Name 10/05/22 1000             History    Chief Complaint Pain  -ANDREW      Type of Pain Knee pain  -ANDREW      Brief Description of Current Complaint Funmilayo Concepcion is a 52 y.o. female who presents to physical therapy today with primary compliant of R knee pain.  Patient is s/p R TKA, revision on 22. She previously underwent R TKR on 10/9/20 with complication and required further surgical intervention. She arrives to clinic ambulating with antalgic gait and no longer using AD. Patient completed HH one week ago. Funmilayo Concepcion reports difficulty/increased pain with bending knee, walking, stairs, getting in/out of car/shower, and sleep. Pain relieving factors include medication, ice, elevation, and resting. PMH includes sx ACL/MCL reconstruction , R TKR 10/9/2020, L Achilles tendon repair x 2, and HTN. Funmilayo Concepcion  primary goal for attending skilled physical therapy is to get in/out of bed with less pain and return to previous functional ability. Patient reports she plays softball recreationally.  -ANDREW      Previous treatment for THIS PROBLEM Rehabilitation;Surgery  -ANDREW      Surgery Date: 09/08/22  -ANDREW      Patient/Caregiver Goals Relieve pain;Return to prior level of function;Improve mobility;Improve strength;Know what to do to help the symptoms  -ANDREW      Occupation/sports/leisure activities works in HR  -ANDREW      What clinical tests have you had for this problem? X-ray  -ANDREW      Results of Clinical Tests see epic  -ANDREW      Are you or can you be pregnant No  -ANDREW              Pain     Pain Location Knee  -ANDREW      Pain at Present 6  -ANDREW      Pain at Best 3  -ANDREW      Pain at Worst 6  -ANDREW      Pain Frequency Constant/continuous  -ANDREW      Pain Description Shooting;Aching  -ANDREW      What Performance Factors Make the Current Problem(s) WORSE? bending knee, walking, stairs, getting in/out of car/shower, and sleep  -ANDREW      What Performance Factors Make the Current Problem(s) BETTER? medication, ice, elevation, resting  -ANDREW      Is your sleep disturbed? Yes  -ANDREW              Fall Risk Assessment    Any falls in the past year: No  -ANDREW              Services    Prior Rehab/Home Health Experiences Yes  -ANDREW      Are you currently receiving Home Health services No  -ANDREW      Do you plan to receive Home Health services in the near future No  -ANDREW              Daily Activities    Primary Language English  -ANDREW      Pt Participated in POC and Goals Yes  -ANDREW              Safety    Are you being hurt, hit, or frightened by anyone at home or in your life? No  -ANDREW      Are you being neglected by a caregiver No  -ANDREW      Have you had any of the following issues with N/A  -ADNREW            User Key  (r) = Recorded By, (t) = Taken By, (c) = Cosigned By    Initials Name Provider Type    Fernanda Gilliam, PT Physical Therapist                 PT Ortho      Row Name 10/05/22 1000       Special Tests/Palpation    Special Tests/Palpation Knee  -ANDREW       Knee Palpation    Knee Palpation? Yes  -ANDREW    Medial Joint Line Right:;Tender  -ANDREW    Posterior Joint Line Right:;Tender  -ANDREW       Patellar Accessory Motions    Patellar Accessory Motions Tested? Yes  -ANDREW    Superior glide WNL  -ANDREW    Inferior glide WNL  -ANDREW    Medial glide WNL  -ANDREW    Lateral glide WNL  -ANDREW       General ROM    RT Lower Ext Rt Knee Extension/Flexion  -ANDREW    LT Lower Ext Lt Knee Extension/Flexion  -ANDREW       Right Lower Ext    Rt Knee Extension/Flexion AROM lacking 4-92  -ANDREW    Rt Knee Extension/Flexion PROM lacking 2-100  -ANDREW       Left Lower Ext    Lt Knee Extension/Flexion AROM 3-0-143  -ANDREW       MMT (Manual Muscle Testing)    Rt Lower Ext Rt Hip Flexion;Rt Knee Extension;Rt Knee Flexion;Rt Ankle Dorsiflexion  -ANDREW    Lt Lower Ext Lt Hip Flexion;Lt Knee Extension;Lt Knee Flexion;Lt Ankle Dorsiflexion  -ANDREW       MMT Right Lower Ext    Rt Hip Flexion MMT, Gross Movement (4/5) good  -ANDREW    Rt Knee Extension MMT, Gross Movement (4/5) good  -ANDREW    Rt Knee Flexion MMT, Gross Movement (4-/5) good minus  -ANDREW    Rt Ankle Dorsiflexion MMT, Gross Movement (4+/5) good plus  -ANDREW       MMT Left Lower Ext    Lt Hip Flexion MMT, Gross Movement (4+/5) good plus  -ANDREW    Lt Knee Extension MMT, Gross Movement (5/5) normal  -ANDREW    Lt Knee Flexion MMT, Gross Movement (5/5) normal  -ANDREW    Lt Ankle Dorsiflexion MMT, Gross Movement (5/5) normal  -ANDREW       Sensation    Sensation WNL? WNL  -ANDREW       Flexibility    Flexibility Tested? Lower Extremity  -ANDREW       Lower Extremity Flexibility    Hamstrings Right:;Moderately limited  -ANDREW    Quadriceps Right:;Moderately limited  -ANDREW    Gastrocnemius Right:;Moderately limited  -ANDREW       Gait/Stairs (Locomotion)    Comment, (Gait/Stairs) antalgic gait, decreased mobility of R knee, limited heel strike  -ANDREW          User Key  (r) = Recorded By, (t) = Taken By, (c) = Cosigned By     Initials Name Provider Type    Fernanda Gilliam, SHASHANK Physical Therapist                            Therapy Education  Education Details: Educated on anatomy/pathology, evaluation findings, therapy expectations, importance of ROM interventions, POC and HEP  Given: HEP, Symptoms/condition management, Pain management  Program: New  How Provided: Verbal, Demonstration, Written  Provided to: Patient  Level of Understanding: Verbalized, Demonstrated  60087 - PT Self Care/Mgmt Minutes: 5      PT OP Goals     Row Name 10/05/22 1000          PT Short Term Goals    STG Date to Achieve 11/04/22  -     STG 1 Pt will be independent with initial HEP to improve strength/ROM and decrease pain.  -     STG 1 Progress New  -     STG 2 Pt will demonstrate improved R knee flexion AROM from 100 deg to 110 deg or better.  -     STG 2 Progress New  -     STG 3 Pt will be able to ascend/descend stairs in a reciprocal pattern with</= 3/10 pain.  -     STG 3 Progress New  [facility]            Long Term Goals    LTG Date to Achieve 12/04/22  -     LTG 1 Pt will be independent with advance HEP to improve strength/ROM and decrease pain.  -     LTG 1 Progress New  -     LTG 2 Pt will demonstrate normalized gait mechanics without AD.  -     LTG 2 Progress New  -     LTG 3 Pt will improve KOS score to 75% to show improved quality of life.  -     LTG 3 Progress New  -     LTG 4 Pt will improve R knee AROM from lacking 4-92 to at least 0-120 deg to improve ability to navigate stairs.  -     LTG 4 Progress New  -     LTG 5 Pt will improve B LE strength to at least 5/5.  -     LTG 5 Progress New  [facility]            Time Calculation    PT Goal Re-Cert Due Date 01/03/23  -           User Key  (r) = Recorded By, (t) = Taken By, (c) = Cosigned By    Initials Name Provider Type    Fernanda Gilliam, SHASHANK Physical Therapist                 PT Assessment/Plan     Row Name 10/05/22 1000          PT Assessment     Functional Limitations Decreased safety during functional activities;Impaired gait;Limitation in home management;Limitations in community activities;Limitations in functional capacity and performance;Performance in leisure activities;Performance in sport activities;Performance in work activities  -ANDREW     Impairments Balance;Edema;Endurance;Gait;Impaired flexibility;Joint mobility;Locomotion;Muscle strength;Pain;Poor body mechanics;Range of motion;Sensation  -ANDREW     Assessment Comments Funmilayo Concepcion is a 52 y.o. female referred to physical therapy for s/p R TKA, revision on 9/8/22. She previously underwent R TKR on 10/9/20 with complication and required further surgical intervention. Patient reports she completed HH one week ago. She presents with a stable clinical presentation, along with comorbidities of sx ACL/MCL reconstruction 2019, R TKR 10/9/2020, L Achilles tendon repair x 2, and HTN and no remarkable personal factors that may impact her progress in the plan of care. Pt presents today with expected post operative edema, healing incision, and TTP over posterior/medial joint line. She also presents with decreased muscle strength, antalgic gait without use of AD and AROM of lacking 4-92 degrees of motion. Her signs and symptoms are consistent with referring diagnosis. The previous impairments limit her ability to stand from a low chair, navigate stairs with reciprocal pattern, walk with normal gait mechanics, and sleep without pain. Patients KOS outcome measure score of 47% ability, where 100% indicates no disability.. Pt will benefit from skilled PT to address the previous impairments and return to PLOF.  -ANDREW     Please refer to paper survey for additional self-reported information Yes  -ANDREW     Rehab Potential Good  -ANDREW     Patient/caregiver participated in establishment of treatment plan and goals Yes  -ANDREW     Patient would benefit from skilled therapy intervention Yes  -ANDREW            PT Plan    PT Frequency  2x/week  -ANDREW     Predicted Duration of Therapy Intervention (PT) 10-12 visits  -ANDREW     Planned CPT's? PT EVAL LOW COMPLEXITY: 23089;PT RE-EVAL: 80182;PT THER PROC EA 15 MIN: 70428;PT THER ACT EA 15 MIN: 84359;PT MANUAL THERAPY EA 15 MIN: 96333;PT NEUROMUSC RE-EDUCATION EA 15 MIN: 93380;PT GAIT TRAINING EA 15 MIN: 70501;PT SELF CARE/HOME MGMT/TRAIN EA 15: 68947;PT HOT OR COLD PACK TREAT MCARE  -ANDREW     PT Plan Comments review response to initial evaluation, warm up on nustep, manual for ROM, consider HR, HS curl, exaggerated walking/kolton kolton, standing hip abd/ext  -ANDREW           User Key  (r) = Recorded By, (t) = Taken By, (c) = Cosigned By    Initials Name Provider Type    Fernanda Gilliam, PT Physical Therapist                   OP Exercises     Row Name 10/05/22 1000             Subjective Comments    Subjective Comments eval  -ANDREW              Total Minutes    55162 - PT Therapeutic Exercise Minutes 25  -ANDREW              Exercise 1    Exercise Name 1 Nustep  -ANDREW      Time 1 5 mins  -ANDREW      Additional Comments L5  -ANDREW              Exercise 2    Exercise Name 2 standing HS stretch  -ANDREW      Cueing 2 Verbal;Demo  -ANDREW      Reps 2 3  -ANDREW      Time 2 20s  -ANDREW              Exercise 3    Exercise Name 3 standing gastroc stretch  -ANDREW      Cueing 3 Verbal;Demo  -ANDREW      Reps 3 3  -ANDREW      Time 3 20s  -ANDREW              Exercise 4    Exercise Name 4 standing knee flexion stretch  -ANDREW      Cueing 4 Verbal;Demo  -ANDREW      Sets 4 1  -ANDREW      Reps 4 10  -ANDREW      Time 4 10s  -ANDREW              Exercise 5    Exercise Name 5 supine knee flexion stretch  -ANDREW      Cueing 5 Verbal;Tactile  -ANDREW      Sets 5 1  -ANDREW      Reps 5 10  -ANDREW      Time 5 10s  -ANDREW      Additional Comments strap  -ANDREW              Exercise 6    Exercise Name 6 supine QS  -ANDREW      Cueing 6 Verbal;Tactile  -ANDREW      Sets 6 1  -ANDREW      Reps 6 10  -ANDREW      Time 6 5s  -ANDREW              Exercise 7    Exercise Name 7 SLR  -ANDREW      Cueing 7 Verbal;Tactile  -ANDREW      Sets 7  2  -ANDREW      Reps 7 10  -ANDREW              Exercise 8    Exercise Name 8 LAQ  -ANDREW      Cueing 8 Verbal;Demo  -ANDREW      Sets 8 2  -ANDREW      Reps 8 10  -ANDREW            User Key  (r) = Recorded By, (t) = Taken By, (c) = Cosigned By    Initials Name Provider Type    Fernanda Gilliam, PT Physical Therapist                                        Time Calculation:     Start Time: 1050  Stop Time: 1130  Time Calculation (min): 40 min  Timed Charges  55752 - PT Therapeutic Exercise Minutes: 25  10848 - PT Self Care/Mgmt Minutes: 5  Untimed Charges  PT Eval/Re-eval Minutes: 10  Total Minutes  Timed Charges Total Minutes: 30  Untimed Charges Total Minutes: 10   Total Minutes: 40     Therapy Charges for Today     Code Description Service Date Service Provider Modifiers Qty    00957337011 HC PT THER PROC EA 15 MIN 10/5/2022 Fernanda Calderon, PT GP 2    95418213789 HC PT EVAL LOW COMPLEXITY 1 10/5/2022 Fernanda Calderon, PT GP 1                    Fernanda Calderon, PT  10/5/2022

## 2022-10-07 ENCOUNTER — HOSPITAL ENCOUNTER (OUTPATIENT)
Dept: PHYSICAL THERAPY | Facility: HOSPITAL | Age: 52
Setting detail: THERAPIES SERIES
Discharge: HOME | End: 2022-10-07

## 2022-10-07 DIAGNOSIS — Z47.89 ORTHOPEDIC AFTERCARE: ICD-10-CM

## 2022-10-07 DIAGNOSIS — Z96.651 S/P REVISION OF TOTAL KNEE, RIGHT: Primary | ICD-10-CM

## 2022-10-07 PROCEDURE — 97110 THERAPEUTIC EXERCISES: CPT

## 2022-10-07 NOTE — THERAPY TREATMENT NOTE
Outpatient Physical Therapy Ortho Treatment Note  [facility]     Patient Name: Funmilayo Concepcion  : 1970  MRN: [id number]  Today's Date: 10/7/2022      Visit Date: 10/07/2022    Visit Dx:    ICD-10-CM ICD-9-CM   1. S/P revision of total knee, right  Z96.651 V43.65   2. Orthopedic aftercare  Z47.89 V54.9       Patient Active Problem List   Diagnosis   • S/P ACL reconstruction   • Pain and swelling of knee   • Aseptic loosening of prosthetic knee (HCC)   • Mechanical loosening of prosthetic knee, initial encounter ([facility])        Past Medical History:   Diagnosis Date   • Arthritis    • Hypertension    • Right knee pain         Past Surgical History:   Procedure Laterality Date   • ACHILLES TENDON REPAIR Left     X2   • FOOT SURGERY Right     HEEL SPURS   • HYSTERECTOMY     • JOINT REPLACEMENT Right     KNEE ARTHROPLASTY   • KNEE ACL RECONSTRUCTION  2016   • TOTAL KNEE ARTHROPLASTY REVISION Right 2022    Procedure: TOTAL KNEE ARTHROPLASTY REVISION;  Surgeon: Kevin Green MD;  Location: [facility] OR [facility] [place];  Service: Orthopedics;  Laterality: Right;        PT Ortho     Row Name 10/07/22 1100       Right Lower Ext    Rt Knee Extension/Flexion AROM lacking 2-108  AAROM  -ANDREW          User Key  (r) = Recorded By, (t) = Taken By, (c) = Cosigned By    Initials Name Provider Type    Fernanda Gilliam, PT Physical Therapist                             PT Assessment/Plan     Row Name 10/07/22 1100          PT Assessment    Assessment Comments Ms. Concepcion returns to PT for first f/u and she is now 4 weeks and 1 day s/p R TKR, revision and arrives ambulating without AD and demonstrating mild antalgic gait. She also expresses 6/10 pain along medial aspect of R knee. Reviewed s/s of DVT/infection due to patient history. She verbalized understanding. Reviewed initial HEP with addition of SL hip abd, STS, HR and HS curls. Patient without complaint of increased knee pain throughout session. Updated HEP  accordingly, reviewed changes with patient and provided handout. Ms. Concepcion remains a candidate for skilled PT.  -ANDREW            PT Plan    PT Plan Comments review response to last session, gait train and potentially end on bike next session  -ANDREW           User Key  (r) = Recorded By, (t) = Taken By, (c) = Cosigned By    Initials Name Provider Type    Fernanda Gilliam, PT Physical Therapist                   OP Exercises     Row Name 10/07/22 1000             Subjective Comments    Subjective Comments I am doing okay. Feeling stiff today  -ANDREW              Subjective Pain    Able to rate subjective pain? yes  -ANDREW      Pre-Treatment Pain Level 6  -ANDREW              Total Minutes    99698 - PT Therapeutic Exercise Minutes 38  -ANDREW              Exercise 1    Exercise Name 1 Nustep  -ANDREW      Time 1 5 mins  -ANDREW      Additional Comments L5  -ANDREW              Exercise 2    Exercise Name 2 standing HS stretch  -ANDREW      Cueing 2 Verbal;Demo  -ANDREW      Reps 2 3  -ANDREW      Time 2 20s  -ANDREW      Additional Comments stairs  -ANDREW              Exercise 3    Exercise Name 3 standing gastroc stretch  -ANDREW      Cueing 3 Verbal;Demo  -ANDREW      Reps 3 3  -ANDREW      Time 3 20s  -ANDREW      Additional Comments stairs  -ANDREW              Exercise 4    Exercise Name 4 standing knee flexion stretch  -ANDREW      Cueing 4 Verbal;Demo  -ANDREW      Sets 4 1  -ANDREW      Reps 4 10  -ANDREW      Time 4 10s  -ANDREW      Additional Comments stairs  -ANDREW              Exercise 5    Exercise Name 5 supine knee flexion stretch  -ANDREW      Cueing 5 Verbal;Tactile  -ANDREW      Sets 5 1  -ANDREW      Reps 5 10  -ANDREW      Time 5 10s  -ANDREW      Additional Comments strap  -ANDREW              Exercise 6    Exercise Name 6 supine QS  -ANDREW      Cueing 6 Verbal;Tactile  -ANDREW      Sets 6 1  -ANDREW      Reps 6 15  -ANDREW      Time 6 5s  -ANDREW              Exercise 7    Exercise Name 7 SLR  -ANDREW      Cueing 7 Verbal;Tactile  -ANDREW      Sets 7 2  -ANDREW      Reps 7 10  -ANDREW              Exercise 8    Exercise Name 8 LAQ   -ANDREW      Cueing 8 Verbal;Demo  -ANDREW      Sets 8 2  -ANDREW      Reps 8 10  -ANDREW              Exercise 9    Exercise Name 9 SL hip abd  -ANDREW      Cueing 9 Verbal;Tactile  -ANDREW      Sets 9 2  -ANDREW      Reps 9 10  -ANDREW              Exercise 10    Exercise Name 10 standing HR  -ANDREW      Cueing 10 Verbal;Demo  -ANDREW      Sets 10 1  -ANDREW      Reps 10 20  -ANDREW              Exercise 11    Exercise Name 11 standing HS curls  -ANDREW      Cueing 11 Verbal;Demo  -ANDREW      Sets 11 2  -ANDREW      Reps 11 10  -ANDREW              Exercise 12    Exercise Name 12 STS  -ANDREW      Cueing 12 Verbal;Demo  -ANDREW      Reps 12 2x10  -ANDREW      Time 12 low table mat, no UE support  -ANDREW      Additional Comments staggered feet for increased load through RLE  -ANDREW            User Key  (r) = Recorded By, (t) = Taken By, (c) = Cosigned By    Initials Name Provider Type    Fernanda Gilliam, PT Physical Therapist                              PT OP Goals     Row Name 10/07/22 1000          PT Short Term Goals    STG Date to Achieve 11/04/22  -ANDREW     STG 1 Pt will be independent with initial HEP to improve strength/ROM and decrease pain.  -ANDREW     STG 1 Progress Ongoing  -ANDREW     STG 2 Pt will demonstrate improved R knee flexion AROM from 100 deg to 110 deg or better.  -     STG 2 Progress Ongoing  -ANDREW     STG 3 Pt will be able to ascend/descend stairs in a reciprocal pattern with</= 3/10 pain.  -     STG 3 Progress Ongoing  -ANDREW            Long Term Goals    LTG Date to Achieve 12/04/22  -ANDREW     LTG 1 Pt will be independent with advance HEP to improve strength/ROM and decrease pain.  -     LTG 1 Progress Ongoing  -ANDREW     LTG 2 Pt will demonstrate normalized gait mechanics without AD.  -     LTG 2 Progress Ongoing  -ANDREW     LTG 3 Pt will improve KOS score to 75% to show improved quality of life.  -     LTG 3 Progress Ongoing  -ANDREW     LTG 4 Pt will improve R knee AROM from lacking 4-92 to at least 0-120 deg to improve ability to navigate stairs.  -     LTG 4  Progress Ongoing  -ANDREW     LTG 5 Pt will improve B LE strength to at least 5/5.  -ANDREW     LTG 5 Progress Ongoing  -ANDREW           User Key  (r) = Recorded By, (t) = Taken By, (c) = Cosigned By    Initials Name Provider Type    Fernanda Gilliam, PT Physical Therapist                Therapy Education  Education Details: updated HEP  Given: HEP, Symptoms/condition management, Pain management  Program: Reinforced, Progressed  How Provided: Verbal, Demonstration, Written  Provided to: Patient  Level of Understanding: Verbalized, Demonstrated              Time Calculation:   Start Time: 1047  Stop Time: 1125  Time Calculation (min): 38 min  Timed Charges  80492 - PT Therapeutic Exercise Minutes: 38  Total Minutes  Timed Charges Total Minutes: 38   Total Minutes: 38  Therapy Charges for Today     Code Description Service Date Service Provider Modifiers Qty    59412227346 HC PT THER PROC EA 15 MIN 10/7/2022 Fernanda Calderon, PT GP 3                    Fernanda Calderon, PT  10/7/2022

## 2022-10-10 ENCOUNTER — APPOINTMENT (OUTPATIENT)
Dept: PHYSICAL THERAPY | Facility: HOSPITAL | Age: 52
End: 2022-10-10

## 2022-10-13 ENCOUNTER — HOSPITAL ENCOUNTER (OUTPATIENT)
Dept: PHYSICAL THERAPY | Facility: HOSPITAL | Age: 52
Setting detail: THERAPIES SERIES
Discharge: HOME | End: 2022-10-13

## 2022-10-13 DIAGNOSIS — Z96.651 S/P REVISION OF TOTAL KNEE, RIGHT: Primary | ICD-10-CM

## 2022-10-13 DIAGNOSIS — M25.561 ACUTE PAIN OF RIGHT KNEE: ICD-10-CM

## 2022-10-13 DIAGNOSIS — Z96.651 S/P TKR (TOTAL KNEE REPLACEMENT), RIGHT: ICD-10-CM

## 2022-10-13 DIAGNOSIS — Z47.89 ORTHOPEDIC AFTERCARE: ICD-10-CM

## 2022-10-13 PROCEDURE — 97110 THERAPEUTIC EXERCISES: CPT

## 2022-10-13 NOTE — THERAPY TREATMENT NOTE
Outpatient Physical Therapy Ortho Treatment Note  [facility]     Patient Name: Funmilayo Concepcion  : 1970  MRN: [id number]  Today's Date: 10/13/2022      Visit Date: 10/13/2022    Visit Dx:    ICD-10-CM ICD-9-CM   1. S/P revision of total knee, right  Z96.651 V43.65   2. Orthopedic aftercare  Z47.89 V54.9   3. S/P TKR (total knee replacement), right  Z96.651 V43.65   4. Acute pain of right knee  M25.561 719.46       Patient Active Problem List   Diagnosis   • S/P ACL reconstruction   • Pain and swelling of knee   • Aseptic loosening of prosthetic knee (HCC)   • Mechanical loosening of prosthetic knee, initial encounter ([facility])        Past Medical History:   Diagnosis Date   • Arthritis    • Hypertension    • Right knee pain         Past Surgical History:   Procedure Laterality Date   • ACHILLES TENDON REPAIR Left     X2   • FOOT SURGERY Right     HEEL SPURS   • HYSTERECTOMY     • JOINT REPLACEMENT Right     KNEE ARTHROPLASTY   • KNEE ACL RECONSTRUCTION  2016   • TOTAL KNEE ARTHROPLASTY REVISION Right 2022    Procedure: TOTAL KNEE ARTHROPLASTY REVISION;  Surgeon: Kevin Green MD;  Location: [facility] OR [facility];  Service: Orthopedics;  Laterality: Right;                        PT Assessment/Plan     Row Name 10/13/22 1000          PT Assessment    Assessment Comments Ms. Concepcion reports continued stiffness R knee however pain overall is improving. She reports good compliance with HEP and demonstrates some improvement in R knee flexion AROM. Progressed therex to include recumbent bike (reciprocal) and step ups front and side with good tolerance. Discussed continued pain manageent strategies and encouraged continued compliance with HEP, pt reports understanding and remains an excellent candidate for skilled PT.  -RS        PT Plan    PT Plan Comments Consider resistance during LAQ/SAQ, progress standing/gait activities, consider leg press  -RS           User Key  (r) = Recorded By, (t) = Taken By, (c) =  Cosigned By    Initials Name Provider Type    RS Mary Beth Ballard, PT Physical Therapist                 Modalities     Row Name 10/13/22 1000             Ice    Ice Applied Yes  -RS      Location R knee  -RS      PT Ice Rx Minutes 10  -RS      Ice S/P Rx Yes  -RS            User Key  (r) = Recorded By, (t) = Taken By, (c) = Cosigned By    Initials Name Provider Type    RS Mary Beth Ballard, PT Physical Therapist               OP Exercises     Row Name 10/13/22 1000             Subjective Comments    Subjective Comments Pt reports  soreness thsi AM but overall slowly getting better  -RS         Subjective Pain    Able to rate subjective pain? yes  -RS      Pre-Treatment Pain Level 5  -RS         Total Minutes    25273 - PT Therapeutic Exercise Minutes 40  -RS         Exercise 1    Exercise Name 1 Nustep  -RS      Time 1 5 mins  -RS      Additional Comments L5  -RS         Exercise 2    Exercise Name 2 standing HS stretch  -RS      Cueing 2 Verbal;Demo  -RS      Reps 2 3  -RS      Time 2 20s  -RS         Exercise 3    Exercise Name 3 --  -RS      Cueing 3 --  -RS      Reps 3 --  -RS      Time 3 --  -RS         Exercise 4    Exercise Name 4 standing knee flexion stretch  -RS      Cueing 4 Verbal;Demo  -RS      Sets 4 1  -RS      Reps 4 10  -RS      Time 4 10s  -RS         Exercise 5    Exercise Name 5 seated knee flex  -RS      Cueing 5 Verbal;Tactile  -RS      Sets 5 1  -RS      Reps 5 10  -RS      Time 5 10s  -RS         Exercise 6    Exercise Name 6 supine QS  -RS      Cueing 6 Verbal;Tactile  -RS      Sets 6 1  -RS      Reps 6 15  -RS      Time 6 5s  -RS         Exercise 7    Exercise Name 7 SLR  -RS      Cueing 7 Verbal;Tactile  -RS      Sets 7 2  -RS      Reps 7 10  -RS         Exercise 8    Exercise Name 8 LAQ/SAQ  -RS      Cueing 8 Verbal;Demo  -RS      Sets 8 2  -RS      Reps 8 10  -RS         Exercise 9    Exercise Name 9 SL hip abd  -RS      Cueing 9 Verbal;Tactile  -RS      Sets 9 2  -RS      Reps 9 10   -RS         Exercise 10    Exercise Name 10 standing HR  -RS      Cueing 10 Verbal;Demo  -RS      Sets 10 1  -RS      Reps 10 20  -RS         Exercise 11    Exercise Name 11 standing HS curls  -RS      Cueing 11 Verbal;Demo  -RS      Sets 11 1  -RS      Reps 11 15  -RS      Additional Comments 2#  -RS         Exercise 12    Exercise Name 12 STS  -RS      Cueing 12 Verbal;Demo  -RS      Reps 12 2x10  -RS      Time 12 low table mat, no UE support  -RS      Additional Comments staggered feet for increased load through RLE  -RS         Exercise 13    Exercise Name 13 step up  -RS      Cueing 13 Verbal;Demo  -RS      Sets 13 2  -RS      Reps 13 10  -RS      Time 13 front and side  -RS      Additional Comments 6 inch  -RS         Exercise 14    Exercise Name 14 rec bike at end  -RS      Cueing 14 Verbal  -RS      Time 14 5 min  -RS      Additional Comments reciprocal fwd  -RS            User Key  (r) = Recorded By, (t) = Taken By, (c) = Cosigned By    Initials Name Provider Type    RS Mary Beth Ballard, PT Physical Therapist                              PT OP Goals     Row Name 10/13/22 1000          PT Short Term Goals    STG Date to Achieve 11/04/22  -RS     STG 1 Pt will be independent with initial HEP to improve strength/ROM and decrease pain.  -RS     STG 1 Progress Met  -RS     STG 2 Pt will demonstrate improved R knee flexion AROM from 100 deg to 110 deg or better.  -RS     STG 2 Progress Ongoing  -RS     STG 3 Pt will be able to ascend/descend stairs in a reciprocal pattern with</= 3/10 pain.  -RS     STG 3 Progress Ongoing  -RS        Long Term Goals    LTG Date to Achieve 12/04/22  -RS     LTG 1 Pt will be independent with advance HEP to improve strength/ROM and decrease pain.  -RS     LTG 1 Progress Ongoing  -RS     LTG 2 Pt will demonstrate normalized gait mechanics without AD.  -RS     LTG 2 Progress Ongoing  -RS     LTG 3 Pt will improve KOS score to 75% to show improved quality of life.  -RS     LTG 3  Progress Ongoing  -RS     LTG 4 Pt will improve R knee AROM from lacking 4-92 to at least 0-120 deg to improve ability to navigate stairs.  -RS     LTG 4 Progress Ongoing  -RS     LTG 5 Pt will improve B LE strength to at least 5/5.  -RS     LTG 5 Progress Ongoing  -RS           User Key  (r) = Recorded By, (t) = Taken By, (c) = Cosigned By    Initials Name Provider Type    RS Mary Beth Ballard, PT Physical Therapist                Therapy Education  Education Details: HEP, gait  Given: HEP, Symptoms/condition management  Program: Reinforced  How Provided: Verbal, Demonstration  Provided to: Patient  Level of Understanding: Verbalized              Time Calculation:   Start Time: 1000  Stop Time: 1055  Time Calculation (min): 55 min  Timed Charges  29917 - PT Therapeutic Exercise Minutes: 40  Untimed Charges  PT Ice Rx Minutes: 10  Total Minutes  Timed Charges Total Minutes: 40  Untimed Charges Total Minutes: 10   Total Minutes: 50  Therapy Charges for Today     Code Description Service Date Service Provider Modifiers Qty    85758484823 HC PT THER PROC EA 15 MIN 10/13/2022 Mary Beth Ballard, PT GP 3    29773752559 HC PT HOT OR COLD PACK TREAT MCARE 10/13/2022 Mary Beth Ballard, PT GP 1                    Mary Beth Ballard PT  10/13/2022

## 2022-10-18 ENCOUNTER — HOSPITAL ENCOUNTER (OUTPATIENT)
Dept: PHYSICAL THERAPY | Facility: HOSPITAL | Age: 52
Setting detail: THERAPIES SERIES
Discharge: HOME | End: 2022-10-18

## 2022-10-18 ENCOUNTER — APPOINTMENT (OUTPATIENT)
Dept: PHYSICAL THERAPY | Facility: HOSPITAL | Age: 52
End: 2022-10-18

## 2022-10-18 DIAGNOSIS — Z96.651 S/P TKR (TOTAL KNEE REPLACEMENT), RIGHT: ICD-10-CM

## 2022-10-18 DIAGNOSIS — M25.561 ACUTE PAIN OF RIGHT KNEE: ICD-10-CM

## 2022-10-18 DIAGNOSIS — Z96.651 S/P REVISION OF TOTAL KNEE, RIGHT: Primary | ICD-10-CM

## 2022-10-18 DIAGNOSIS — Z47.89 ORTHOPEDIC AFTERCARE: ICD-10-CM

## 2022-10-18 PROCEDURE — 97110 THERAPEUTIC EXERCISES: CPT | Performed by: PHYSICAL THERAPIST

## 2022-10-18 NOTE — THERAPY TREATMENT NOTE
Outpatient Physical Therapy Ortho Treatment Note  [facility]     Patient Name: Funmilayo Concepcion  : 1970  MRN: [id number]  Today's Date: 10/18/2022      Visit Date: 10/18/2022    Visit Dx:    ICD-10-CM ICD-9-CM   1. S/P revision of total knee, right  Z96.651 V43.65   2. Orthopedic aftercare  Z47.89 V54.9   3. S/P TKR (total knee replacement), right  Z96.651 V43.65   4. Acute pain of right knee  M25.561 719.46       Patient Active Problem List   Diagnosis   • S/P ACL reconstruction   • Pain and swelling of knee   • Aseptic loosening of prosthetic knee (HCC)   • Mechanical loosening of prosthetic knee, initial encounter ([facility])        Past Medical History:   Diagnosis Date   • Arthritis    • Hypertension    • Right knee pain         Past Surgical History:   Procedure Laterality Date   • ACHILLES TENDON REPAIR Left     X2   • FOOT SURGERY Right     HEEL SPURS   • HYSTERECTOMY     • JOINT REPLACEMENT Right     KNEE ARTHROPLASTY   • KNEE ACL RECONSTRUCTION  2016   • TOTAL KNEE ARTHROPLASTY REVISION Right 2022    Procedure: TOTAL KNEE ARTHROPLASTY REVISION;  Surgeon: Kevin Green MD;  [place] [facility] [place] [facility]  Service: Orthopedics;  Laterality: Right;                        PT Assessment/Plan     Row Name 10/18/22 1124          PT Assessment    Assessment Comments Ms. Concepcion is 5 weeks, 5 days s/p R TKA revision.  She continues to reports stiffness/soreness of her R knee. We reviewed physiology of healing and appropriate time frames.  She ambulates with nearing normal heel to toe gait pattern, no AD.  We were able to progress her functional strengthening today and increased resistance on several exercises. Instructed her in trial of ice cup massage to the R medial knee. She remains a good candidate for skilled physical therapy  -GJ        PT Plan    PT Plan Comments assess response to new exercises, add lateral steppign, monster walk F/B, ? mini forward lunge. step down  -GJ           User Key  (r) =  Recorded By, (t) = Taken By, (c) = Cosigned By    Initials Name Provider Type    Zane Balderas, PT Physical Therapist                   OP Exercises     Row Name 10/18/22 1051 10/18/22 1000          Subjective Comments    Subjective Comments -- I'm still stiff and sore  -GJ        Total Minutes    77908 - PT Therapeutic Exercise Minutes 39  -GJ --        Exercise 1    Exercise Name 1 -- Nustep  -GJ     Time 1 -- 5 mins  -GJ        Exercise 7    Exercise Name 7 -- SLR  -GJ     Cueing 7 -- Verbal;Tactile  -GJ     Sets 7 -- 2  -GJ     Reps 7 -- 15  -GJ        Exercise 8    Exercise Name 8 -- LAQ/SAQ  -GJ     Cueing 8 -- Verbal;Demo  -GJ     Sets 8 -- 2  -GJ     Reps 8 -- 15  -GJ     Time 8 -- 4#  -GJ        Exercise 10    Exercise Name 10 -- standing HR  -GJ     Cueing 10 -- Verbal;Demo  -GJ     Reps 10 -- 20  -GJ        Exercise 11    Exercise Name 11 -- standing HS curls  -GJ     Cueing 11 -- Verbal;Demo  -GJ     Sets 11 -- 2  -GJ     Reps 11 -- 15  -GJ     Time 11 -- B  -GJ     Additional Comments -- 2#, blue pad  -GJ        Exercise 12    Exercise Name 12 -- STS  -GJ     Cueing 12 -- Verbal;Demo  -GJ     Reps 12 -- 2x10  -GJ     Time 12 -- low table mat, no UE support  -GJ        Exercise 13    Exercise Name 13 -- step up  -GJ     Cueing 13 -- Verbal;Demo  -GJ     Sets 13 -- 2  -GJ     Reps 13 -- 15  -GJ     Time 13 -- forward, lateral, transverse  -GJ     Additional Comments -- 6 inch, no hands  -GJ        Exercise 15    Exercise Name 15 -- leg press, RLE  -GJ     Cueing 15 -- Verbal;Demo  -GJ     Sets 15 -- 2  -GJ     Reps 15 -- 15  -GJ     Time 15 -- 80#  -GJ           User Key  (r) = Recorded By, (t) = Taken By, (c) = Cosigned By    Initials Name Provider Type    Zane Balderas, PT Physical Therapist                              PT OP Goals     Row Name 10/18/22 1000          PT Short Term Goals    STG Date to Achieve 11/04/22  -GJ     STG 1 Pt will be independent with initial HEP to improve  strength/ROM and decrease pain.  -GJ     STG 1 Progress Met  -GJ     STG 2 Pt will demonstrate improved R knee flexion AROM from 100 deg to 110 deg or better.  -GJ     STG 2 Progress Ongoing  -GJ     STG 3 Pt will be able to ascend/descend stairs in a reciprocal pattern with</= 3/10 pain.  -GJ     STG 3 Progress Ongoing  -GJ        Long Term Goals    LTG Date to Achieve 12/04/22  -GJ     LTG 1 Pt will be independent with advance HEP to improve strength/ROM and decrease pain.  -GJ     LTG 1 Progress Ongoing  -GJ     LTG 2 Pt will demonstrate normalized gait mechanics without AD.  -GJ     LTG 2 Progress Ongoing  -GJ     LTG 3 Pt will improve KOS score to 75% to show improved quality of life.  -GJ     LTG 3 Progress Ongoing  -GJ     LTG 4 Pt will improve R knee AROM from lacking 4-92 to at least 0-120 deg to improve ability to navigate stairs.  -GJ     LTG 4 Progress Ongoing  -GJ     LTG 5 Pt will improve B LE strength to at least 5/5.  -GJ     LTG 5 Progress Ongoing  -GJ           User Key  (r) = Recorded By, (t) = Taken By, (c) = Cosigned By    Initials Name Provider Type    Zane Balderas, PT Physical Therapist                Therapy Education  Education Details: educated in ice cup massage ot R medial knee, 5 min. BID for 2-4 days, discussed physiology of healing, particularly following multiple knee surgeries,  Given: HEP, Symptoms/condition management, Pain management, Posture/body mechanics, Mobility training, Edema management  Program: New, Reinforced, Progressed  How Provided: Verbal, Demonstration  Provided to: Patient  Level of Understanding: Teach back education performed, Verbalized, Demonstrated              Time Calculation:   Start Time: 1051  Stop Time: 1130  Time Calculation (min): 39 min  Timed Charges  75019 - PT Therapeutic Exercise Minutes: 39  Total Minutes  Timed Charges Total Minutes: 39   Total Minutes: 39  Therapy Charges for Today     Code Description Service Date Service Provider  Modifiers Qty    [id number]  PT THER PROC EA 15 MIN 10/18/2022 Zane Kingsley, PT GP 3                    Zane Kingsley, PT  10/18/2022

## 2022-10-20 ENCOUNTER — HOSPITAL ENCOUNTER (OUTPATIENT)
Dept: PHYSICAL THERAPY | Facility: HOSPITAL | Age: 52
Setting detail: THERAPIES SERIES
Discharge: HOME | End: 2022-10-20

## 2022-10-20 DIAGNOSIS — Z47.89 ORTHOPEDIC AFTERCARE: ICD-10-CM

## 2022-10-20 DIAGNOSIS — M25.561 ACUTE PAIN OF RIGHT KNEE: ICD-10-CM

## 2022-10-20 DIAGNOSIS — Z96.651 S/P REVISION OF TOTAL KNEE, RIGHT: Primary | ICD-10-CM

## 2022-10-20 DIAGNOSIS — Z96.651 S/P TKR (TOTAL KNEE REPLACEMENT), RIGHT: ICD-10-CM

## 2022-10-20 PROCEDURE — 97110 THERAPEUTIC EXERCISES: CPT | Performed by: PHYSICAL THERAPIST

## 2022-10-20 NOTE — THERAPY TREATMENT NOTE
Outpatient Physical Therapy Ortho Treatment Note  [facility]     Patient Name: Funmilayo Concepcion  : 1970  MRN: [id number]  Today's Date: 10/20/2022      Visit Date: 10/20/2022    Visit Dx:    ICD-10-CM ICD-9-CM   1. S/P revision of total knee, right  Z96.651 V43.65   2. Orthopedic aftercare  Z47.89 V54.9   3. S/P TKR (total knee replacement), right  Z96.651 V43.65   4. Acute pain of right knee  M25.561 719.46       Patient Active Problem List   Diagnosis   • S/P ACL reconstruction   • Pain and swelling of knee   • Aseptic loosening of prosthetic knee (HCC)   • Mechanical loosening of prosthetic knee, initial encounter ([facility])        Past Medical History:   Diagnosis Date   • Arthritis    • Hypertension    • Right knee pain         Past Surgical History:   Procedure Laterality Date   • ACHILLES TENDON REPAIR Left     X2   • FOOT SURGERY Right     HEEL SPURS   • HYSTERECTOMY     • JOINT REPLACEMENT Right     KNEE ARTHROPLASTY   • KNEE ACL RECONSTRUCTION  2016   • TOTAL KNEE ARTHROPLASTY REVISION Right 2022    Procedure: TOTAL KNEE ARTHROPLASTY REVISION;  Surgeon: Kevin Green MD;  Location: [facility] OR [facility] [place];  Service: Orthopedics;  Laterality: Right;                        PT Assessment/Plan     Row Name 10/20/22 1042          PT Assessment    Assessment Comments Ms. Concepcion is 6 weeks s/p R TKA revision. She repors some soreness/increased swelling after last session. We did progress her strengthening/functional mobility activities today, working on hip girdle/LE strength and balance, incorporting multiple planes of mobility. She will perform ROM/stretching 1-2 x's per day, strengthening only once every other day (has been doing it 2-3 x's per day, which may be leading to incresed soreness/swelling).  Ms. Concepcion continues to progress toward all functional goals and remains a good candidate for skilled physical therapy.  -GJ        PT Plan    PT Plan Comments assess response to previous session and  tirp to [employer].  Contiinue to work on LE strength/balance, functional mobility, assess response to pulling back on strengthening at home.  Reassess ROM, add step downs  -GJ           User Key  (r) = Recorded By, (t) = Taken By, (c) = Cosigned By    Initials Name Provider Type    Zane Balderas, PT Physical Therapist                   OP Exercises     Row Name 10/20/22 1001 10/20/22 1000          Subjective Comments    Subjective Comments -- I did ok for a little bit after last session, then the swelling felt like it was coming back. I did go to a few store after last session too.  -GJ        Total Minutes    30987 - PT Therapeutic Exercise Minutes 43  -GJ --        Exercise 1    Exercise Name 1 -- Nustep  -GJ     Time 1 -- 5 mins  -GJ        Exercise 10    Exercise Name 10 -- standing HR  -GJ     Cueing 10 -- Verbal;Demo  -GJ     Reps 10 -- 20  -GJ     Time 10 -- blue pad  -GJ        Exercise 11    Exercise Name 11 -- standing HS curls  -GJ     Cueing 11 -- Verbal;Demo  -GJ     Sets 11 -- 2  -GJ     Reps 11 -- 15  -GJ     Time 11 -- B  -GJ     Additional Comments -- 2#, blue pad, no hands  -GJ        Exercise 12    Exercise Name 12 -- STS  -GJ     Cueing 12 -- Verbal;Demo  -GJ     Reps 12 -- 2x10  -GJ     Time 12 -- low table mat, no UE support  -GJ        Exercise 13    Exercise Name 13 -- step up  -GJ     Cueing 13 -- Verbal;Demo  -GJ     Sets 13 -- 2  -GJ     Reps 13 -- 15  -GJ     Time 13 -- forward, lateral, transverse  -GJ     Additional Comments -- 6 inch, no hands  -GJ        Exercise 15    Exercise Name 15 -- leg press, RLE  -GJ     Cueing 15 -- Verbal;Demo  -GJ     Sets 15 -- 2  -GJ     Reps 15 -- 15  -GJ     Time 15 -- 80#  -GJ        Exercise 16    Exercise Name 16 -- lateral stepping  -GJ     Cueing 16 -- Verbal  -GJ     Reps 16 -- 3 laps  -GJ     Time 16 -- RTB  -GJ        Exercise 17    Exercise Name 17 -- monster wal, F/B  -GJ     Cueing 17 -- Verbal;Demo  -GJ     Reps 17 -- 3 laps  -GJ      Time 17 -- RTB  -GJ           User Key  (r) = Recorded By, (t) = Taken By, (c) = Cosigned By    Initials Name Provider Type    Zane Balderas, PT Physical Therapist                              PT OP Goals     Row Name 10/20/22 1000          PT Short Term Goals    STG Date to Achieve 11/04/22  -GJ     STG 1 Pt will be independent with initial HEP to improve strength/ROM and decrease pain.  -GJ     STG 1 Progress Met  -GJ     STG 2 Pt will demonstrate improved R knee flexion AROM from 100 deg to 110 deg or better.  -GJ     STG 2 Progress Ongoing  -GJ     STG 3 Pt will be able to ascend/descend stairs in a reciprocal pattern with</= 3/10 pain.  -GJ     STG 3 Progress Ongoing  -GJ        Long Term Goals    LTG Date to Achieve 12/04/22  -GJ     LTG 1 Pt will be independent with advance HEP to improve strength/ROM and decrease pain.  -GJ     LTG 1 Progress Ongoing  -GJ     LTG 2 Pt will demonstrate normalized gait mechanics without AD.  -GJ     LTG 2 Progress Ongoing  -GJ     LTG 3 Pt will improve KOS score to 75% to show improved quality of life.  -GJ     LTG 3 Progress Ongoing  -GJ     LTG 4 Pt will improve R knee AROM from lacking 4-92 to at least 0-120 deg to improve ability to navigate stairs.  -GJ     LTG 4 Progress Ongoing  -GJ     LTG 5 Pt will improve B LE strength to at least 5/5.  -GJ     LTG 5 Progress Ongoing  -GJ           User Key  (r) = Recorded By, (t) = Taken By, (c) = Cosigned By    Initials Name Provider Type    Zane Balderas, PT Physical Therapist                Therapy Education  Education Details: discussed activitymodifications, use of ice, on her trip take multiple rest breaks to get up and walk around. Discussed allowing tissue to recover, stretches/ROM 1-2 x's per day, strengthening once every other day.  Given: HEP, Symptoms/condition management, Pain management, Posture/body mechanics, Mobility training, Edema management  Program: New, Reinforced, Progressed  How Provided: Verbal,  Demonstration  Provided to: Patient  Level of Understanding: Teach back education performed, Verbalized, Demonstrated              Time Calculation:   Start Time: 1002  Stop Time: 1047  Time Calculation (min): 45 min  Timed Charges  96262 - PT Therapeutic Exercise Minutes: 43  Total Minutes  Timed Charges Total Minutes: 43   Total Minutes: 43  Therapy Charges for Today     Code Description Service Date Service Provider Modifiers Qty    98466298394 HC PT THER PROC EA 15 MIN 10/20/2022 Zane Kingsley, PT GP 3                    Zane Kingsley, PT  10/20/2022

## 2022-10-26 ENCOUNTER — HOSPITAL ENCOUNTER (OUTPATIENT)
Dept: PHYSICAL THERAPY | Facility: HOSPITAL | Age: 52
Setting detail: THERAPIES SERIES
Discharge: HOME | End: 2022-10-26

## 2022-10-26 DIAGNOSIS — M25.561 ACUTE PAIN OF RIGHT KNEE: ICD-10-CM

## 2022-10-26 DIAGNOSIS — Z96.651 S/P REVISION OF TOTAL KNEE, RIGHT: Primary | ICD-10-CM

## 2022-10-26 DIAGNOSIS — Z47.89 ORTHOPEDIC AFTERCARE: ICD-10-CM

## 2022-10-26 DIAGNOSIS — Z96.651 S/P TKR (TOTAL KNEE REPLACEMENT), RIGHT: ICD-10-CM

## 2022-10-26 PROCEDURE — 97110 THERAPEUTIC EXERCISES: CPT

## 2022-10-26 NOTE — THERAPY TREATMENT NOTE
Outpatient Physical Therapy Ortho Treatment Note  [facility]     Patient Name: Funmilayo Concepcion  : 1970  MRN: [id number]  Today's Date: 10/26/2022      Visit Date: 10/26/2022    Visit Dx:    ICD-10-CM ICD-9-CM   1. S/P revision of total knee, right  Z96.651 V43.65   2. Orthopedic aftercare  Z47.89 V54.9   3. S/P TKR (total knee replacement), right  Z96.651 V43.65   4. Acute pain of right knee  M25.561 719.46       Patient Active Problem List   Diagnosis   • S/P ACL reconstruction   • Pain and swelling of knee   • Aseptic loosening of prosthetic knee (HCC)   • Mechanical loosening of prosthetic knee, initial encounter ([facility])        Past Medical History:   Diagnosis Date   • Arthritis    • Hypertension    • Right knee pain         Past Surgical History:   Procedure Laterality Date   • ACHILLES TENDON REPAIR Left     X2   • FOOT SURGERY Right     HEEL SPURS   • HYSTERECTOMY     • JOINT REPLACEMENT Right     KNEE ARTHROPLASTY   • KNEE ACL RECONSTRUCTION  2016   • TOTAL KNEE ARTHROPLASTY REVISION Right 2022    Procedure: TOTAL KNEE ARTHROPLASTY REVISION;  Surgeon: Kevin Green MD;  Location: [facility] OR [facility] [place];  Service: Orthopedics;  Laterality: Right;                        PT Assessment/Plan     Row Name 10/26/22 1136          PT Assessment    Assessment Comments Ms. Concepcion returns for 6th visit, following revision of R total knee 6 weeks ago.  She continues to progress with all activity, tolerating increased weights/resistance with some exercises.  AROM R knee= 106 in seated position and 110 with stretch on stairs.  Pt with some increased soreness today, due to increased activity over the weekend.  Pt able to go up/down stairs in clinic with recipicol pattern with no increase in pain, but continues with just slight compensation at hip.  -LP     Please refer to paper survey for additional self-reported information Yes  -LP     Rehab Potential Good  -LP     Patient/caregiver participated in  establishment of treatment plan and goals Yes  -LP     Patient would benefit from skilled therapy intervention Yes  -LP        PT Plan    PT Frequency 2x/week  -LP     Predicted Duration of Therapy Intervention (PT) 2-3 weeks  -LP     PT Plan Comments Pt to surgeon this Friday.  Continue per orders with strengthening and ROM  -LP           User Key  (r) = Recorded By, (t) = Taken By, (c) = Cosigned By    Initials Name Provider Type    LP Nyla Silva, PT Physical Therapist                   OP Exercises     Row Name 10/26/22 1000             Subjective Comments    Subjective Comments Went to [place] for [employer] [facility] tournament, did some light pitching  -LP         Subjective Pain    Able to rate subjective pain? yes  -LP      Pre-Treatment Pain Level 6  -LP         Total Minutes    58166 - PT Therapeutic Exercise Minutes 40  -LP         Exercise 1    Exercise Name 1 Nustep  -LP      Cueing 1 Verbal  -LP      Time 1 5 min  -LP      Additional Comments L5  -LP         Exercise 2    Exercise Name 2 standing HS stretch  -LP      Cueing 2 Verbal;Demo  -LP      Sets 2 1  -LP      Reps 2 3  -LP      Time 2 20s  -LP      Additional Comments atsteps  -LP         Exercise 4    Exercise Name 4 standing knee flexion stretch  -LP      Cueing 4 Verbal;Demo  -LP      Sets 4 1  -LP      Reps 4 5  -LP      Time 4 10s  -LP      Additional Comments to 110  -LP         Exercise 7    Exercise Name 7 SLR  -LP      Cueing 7 Verbal;Tactile  -LP      Sets 7 3  -LP      Reps 7 10  -LP         Exercise 8    Exercise Name 8 LAQ/SAQ  -LP      Cueing 8 Verbal;Demo  -LP      Sets 8 2  -LP      Reps 8 15  -LP      Time 8 4#  -LP         Exercise 10    Exercise Name 10 standing HR  -LP      Cueing 10 Verbal;Demo  -LP      Sets 10 1  -LP      Reps 10 20  -LP      Time 10 blue pad  -LP         Exercise 11    Exercise Name 11 standing HS curls  -LP      Cueing 11 Verbal;Demo  -LP      Sets 11 2  -LP      Reps 11 15  -LP      Time 11 B  -LP       "Additional Comments 2# on blue pad  -LP         Exercise 12    Exercise Name 12 STS  -LP      Cueing 12 Verbal;Demo  -LP      Reps 12 15  -LP      Additional Comments lowest mat table  -LP         Exercise 13    Exercise Name 13 step up  -LP      Cueing 13 Verbal;Demo  -LP      Sets 13 2  -LP      Reps 13 15  -LP      Time 13 forward only  -LP      Additional Comments 6\" no hand support  -LP         Exercise 15    Exercise Name 15 leg press, RLE  -LP      Cueing 15 Verbal;Demo  -LP      Sets 15 2  -LP      Reps 15 15  -LP      Time 15 100#  -LP         Exercise 16    Exercise Name 16 lateral stepping  -LP      Cueing 16 Verbal  -LP      Reps 16 3 laps  -LP      Time 16 RTB  -LP         Exercise 17    Exercise Name 17 yaa wal, F/B  -LP      Cueing 17 Verbal;Demo  -LP      Reps 17 3 laps  -LP      Time 17 RTB  -LP      Additional Comments maintaing small squat  -LP         Exercise 19    Exercise Name 19 step overs  -LP      Cueing 19 Verbal;Tactile;Demo  -LP      Sets 19 1  -LP      Reps 19 10  -LP      Additional Comments 6\"  -LP         Exercise 20    Exercise Name 20 step downs  -LP      Cueing 20 Verbal;Demo  -LP      Sets 20 1  -LP      Reps 20 10  -LP      Additional Comments 6\"  -LP            User Key  (r) = Recorded By, (t) = Taken By, (c) = Cosigned By    Initials Name Provider Type    Nyla Ramirez, PT Physical Therapist                              PT OP Goals     Row Name 10/26/22 1100          PT Short Term Goals    STG Date to Achieve 11/04/22  -LP     STG 1 Pt will be independent with initial HEP to improve strength/ROM and decrease pain.  -LP     STG 1 Progress Met  -LP     STG 2 Pt will demonstrate improved R knee flexion AROM from 100 deg to 110 deg or better.  -LP     STG 2 Progress Progressing  -LP     STG 2 Progress Comments Active knee flex:106 seated on mat, 110 on stair stretch  -LP     STG 3 Pt will be able to ascend/descend stairs in a reciprocal pattern with</= 3/10 pain.  -LP  "    STG 3 Progress Progressing  -LP     STG 3 Progress Comments Pt did this with no increase in pain. min compensation at hip  -LP        Long Term Goals    LTG Date to Achieve 12/04/22  -LP     LTG 1 Pt will be independent with advance HEP to improve strength/ROM and decrease pain.  -LP     LTG 1 Progress Progressing  -LP     LTG 2 Pt will demonstrate normalized gait mechanics without AD.  -LP     LTG 2 Progress Progressing  -LP     LTG 2 Progress Comments continues with decreased wt shift to R, but otherwise pretty good mechanics  -LP     LTG 3 Pt will improve KOS score to 75% to show improved quality of life.  -LP     LTG 3 Progress Ongoing  -LP     LTG 4 Pt will improve R knee AROM from lacking 4-92 to at least 0-120 deg to improve ability to navigate stairs.  -LP     LTG 4 Progress Progressing  -LP     LTG 5 Pt will improve B LE strength to at least 5/5.  -LP     LTG 5 Progress Progressing  -LP     LTG 5 Progress Comments progressing with amount of weight/resistance tolerated  -LP           User Key  (r) = Recorded By, (t) = Taken By, (c) = Cosigned By    Initials Name Provider Type     Nyla Silva, PT Physical Therapist                Therapy Education  Given: HEP, Symptoms/condition management  Program: Reinforced, New, Progressed  How Provided: Verbal  Provided to: Patient  Level of Understanding: Teach back education performed              Time Calculation:   Start Time: 1045  Stop Time: 1130  Time Calculation (min): 45 min  Timed Charges  35440 - PT Therapeutic Exercise Minutes: 40  Total Minutes  Timed Charges Total Minutes: 40   Total Minutes: 40  Therapy Charges for Today     Code Description Service Date Service Provider Modifiers Qty    81293305689 HC PT THER PROC EA 15 MIN 10/26/2022 Nyla Silva PT GP 3                    Nyla Silva PT  10/26/2022

## 2022-11-01 ENCOUNTER — HOSPITAL ENCOUNTER (OUTPATIENT)
Dept: PHYSICAL THERAPY | Facility: HOSPITAL | Age: 52
Setting detail: THERAPIES SERIES
Discharge: HOME | End: 2022-11-01

## 2022-11-01 DIAGNOSIS — Z96.651 S/P TKR (TOTAL KNEE REPLACEMENT), RIGHT: ICD-10-CM

## 2022-11-01 DIAGNOSIS — Z96.651 S/P REVISION OF TOTAL KNEE, RIGHT: Primary | ICD-10-CM

## 2022-11-01 DIAGNOSIS — M25.561 ACUTE PAIN OF RIGHT KNEE: ICD-10-CM

## 2022-11-01 DIAGNOSIS — Z47.89 ORTHOPEDIC AFTERCARE: ICD-10-CM

## 2022-11-01 PROCEDURE — 97110 THERAPEUTIC EXERCISES: CPT

## 2022-11-01 NOTE — THERAPY TREATMENT NOTE
Outpatient Physical Therapy Ortho Treatment Note  [facility]     Patient Name: Funmilayo Concepcion  : 1970  MRN: [id number]  Today's Date: 2022      Visit Date: 2022    Visit Dx:    ICD-10-CM ICD-9-CM   1. S/P revision of total knee, right  Z96.651 V43.65   2. Orthopedic aftercare  Z47.89 V54.9   3. S/P TKR (total knee replacement), right  Z96.651 V43.65   4. Acute pain of right knee  M25.561 719.46       Patient Active Problem List   Diagnosis   • S/P ACL reconstruction   • Pain and swelling of knee   • Aseptic loosening of prosthetic knee (HCC)   • Mechanical loosening of prosthetic knee, initial encounter ([facility])        Past Medical History:   Diagnosis Date   • Arthritis    • Hypertension    • Right knee pain         Past Surgical History:   Procedure Laterality Date   • ACHILLES TENDON REPAIR Left     X2   • FOOT SURGERY Right     HEEL SPURS   • HYSTERECTOMY     • JOINT REPLACEMENT Right     KNEE ARTHROPLASTY   • KNEE ACL RECONSTRUCTION  2016   • TOTAL KNEE ARTHROPLASTY REVISION Right 2022    Procedure: TOTAL KNEE ARTHROPLASTY REVISION;  Surgeon: Kevin Green MD;  [facility]: [facility] [place];  Service: Orthopedics;  Laterality: Right;                        PT Assessment/Plan     Row Name 22 1000          PT Assessment    Assessment Comments Funmilayo arrives to clinic with no new complaints, primary pain remains medial/distal quad and patellar tendon region. Trialed gentle STM and patellar mobs to relieve pain at end of session with minimal change following. Progressed functional strength with quad burner and transverse lunge to begin working on multi-planar motion and weight shift. She remains appropriate for skilled  PT.  -        PT Plan    PT Plan Comments consider small fwd lunge with trunk rotation?  -           User Key  (r) = Recorded By, (t) = Taken By, (c) = Cosigned By    Initials Name Provider Type    Funmilayo Huff, PT Physical Therapist                   OP  Exercises     Row Name 11/01/22 0900             Subjective Comments    Subjective Comments It still jsut the inside part that hurts, its a little stiff today  -MH         Total Minutes    18718 - PT Therapeutic Exercise Minutes 40  -MH      64988 - PT Manual Therapy Minutes 2  -MH         Exercise 1    Exercise Name 1 NuStep  -MH      Cueing 1 Verbal  -MH      Time 1 5 min  -MH      Additional Comments L5  -MH         Exercise 2    Exercise Name 2 rec bike  -MH      Sets 2 verbal;  -MH      Time 2 3 min  -MH      Additional Comments seat 6  -MH         Exercise 3    Exercise Name 3 standing gastroc stretch  -MH      Cueing 3 Verbal;Demo  -MH      Reps 3 3  -MH      Time 3 20s  -MH      Additional Comments stairs  -MH         Exercise 4    Exercise Name 4 standing knee flexion stretch  -MH      Cueing 4 Verbal;Demo  -MH      Sets 4 1  -MH      Reps 4 10  -MH      Time 4 10s  -MH         Exercise 5    Exercise Name 5 standing HS stretch  -MH      Cueing 5 Verbal;Demo  -MH      Reps 5 3  -MH      Time 5 20s  -MH      Additional Comments stairs  -MH         Exercise 10    Exercise Name 10 standing HR  -MH      Cueing 10 Verbal;Demo  -MH      Sets 10 1  -MH      Reps 10 20  -MH      Time 10 blue pad  -MH         Exercise 14    Exercise Name 14 transverse lunge  -MH      Cueing 14 Verbal;Demo  -MH      Reps 14 10ea  -MH         Exercise 15    Exercise Name 15 leg press, RLE  -MH      Cueing 15 Verbal;Demo  -MH      Sets 15 2  -MH      Reps 15 15  -MH      Time 15 110#  -MH         Exercise 16    Exercise Name 16 lateral stepping  -MH      Cueing 16 Verbal  -MH      Reps 16 3 laps  -MH      Time 16 RTB  -MH         Exercise 17    Exercise Name 17 monster walk, F/B  -MH      Cueing 17 Verbal;Demo  -MH      Reps 17 3 laps  -MH      Time 17 RTB  -MH      Additional Comments maintaining small squat  -MH         Exercise 18    Exercise Name 18 quad burner - SLS on airex  -MH      Cueing 18 Verbal;Demo  -MH      Sets 18 3  -MH   "    Reps 18 30sec  -         Exercise 19    Exercise Name 19 step overs  -      Cueing 19 Verbal;Tactile;Demo  -      Sets 19 1  -      Reps 19 15  -      Additional Comments 6\" lateral  -         Exercise 20    Exercise Name 20 step downs  -      Cueing 20 Verbal;Demo  -      Sets 20 1  -      Reps 20 10  -      Additional Comments 6\"  -            User Key  (r) = Recorded By, (t) = Taken By, (c) = Cosigned By    Initials Name Provider Type     Funmilayo Yang, PT Physical Therapist                         Manual Rx (last 36 hours)     Manual Treatments     Row Name 11/01/22 0900             Total Minutes    49969 - PT Manual Therapy Minutes 2  -         Manual Rx 1    Manual Rx 1 Location STM disal/medial quad and patellar mobs  -            User Key  (r) = Recorded By, (t) = Taken By, (c) = Cosigned By    Initials Name Provider Type     Funmilayo Yang, PT Physical Therapist                 PT OP Goals     Row Name 11/01/22 1000          PT Short Term Goals    STG Date to Achieve 11/04/22  -     STG 1 Pt will be independent with initial HEP to improve strength/ROM and decrease pain.  -     STG 1 Progress Met  -     STG 2 Pt will demonstrate improved R knee flexion AROM from 100 deg to 110 deg or better.  -     STG 2 Progress Progressing  -     STG 3 Pt will be able to ascend/descend stairs in a reciprocal pattern with</= 3/10 pain.  -     STG 3 Progress Progressing  -        Long Term Goals    LTG Date to Achieve 12/04/22  -     LTG 1 Pt will be independent with advance HEP to improve strength/ROM and decrease pain.  -     LTG 1 Progress Progressing  -     LTG 2 Pt will demonstrate normalized gait mechanics without AD.  -     LTG 2 Progress Progressing  -     LTG 3 Pt will improve KOS score to 75% to show improved quality of life.  -     LTG 3 Progress Ongoing  -     LTG 4 Pt will improve R knee AROM from lacking 4-92 to at least 0-120 deg to improve " ability to navigate stairs.  -     LTG 4 Progress Progressing  -     LTG 5 Pt will improve B LE strength to at least 5/5.  -     LTG 5 Progress Progressing  -           User Key  (r) = Recorded By, (t) = Taken By, (c) = Cosigned By    Initials Name Provider Type    Funmilayo Huff PT Physical Therapist                Therapy Education  Education Details: appropriate gym routine  Given: HEP, Symptoms/condition management  Program: Reinforced, Progressed  How Provided: Verbal, Demonstration  Provided to: Patient  Level of Understanding: Demonstrated, Verbalized              Time Calculation:   Start Time: 0945  Stop Time: 1027  Time Calculation (min): 42 min  Total Timed Code Minutes- PT: 42 minute(s)  Timed Charges  09807 - PT Therapeutic Exercise Minutes: 40  12609 - PT Manual Therapy Minutes: 2  Total Minutes  Timed Charges Total Minutes: 42   Total Minutes: 42  Therapy Charges for Today     Code Description Service Date Service Provider Modifiers Qty    84372286410 HC PT THER PROC EA 15 MIN 11/1/2022 Funmilayo Yang, PT GP 3                    Funmilayo Yang PT  11/1/2022

## 2022-11-03 ENCOUNTER — HOSPITAL ENCOUNTER (OUTPATIENT)
Dept: PHYSICAL THERAPY | Facility: HOSPITAL | Age: 52
Setting detail: THERAPIES SERIES
Discharge: HOME | End: 2022-11-03

## 2022-11-03 DIAGNOSIS — Z96.651 S/P REVISION OF TOTAL KNEE, RIGHT: Primary | ICD-10-CM

## 2022-11-03 DIAGNOSIS — Z47.89 ORTHOPEDIC AFTERCARE: ICD-10-CM

## 2022-11-03 PROCEDURE — 97110 THERAPEUTIC EXERCISES: CPT

## 2022-11-03 PROCEDURE — 97530 THERAPEUTIC ACTIVITIES: CPT

## 2022-11-03 NOTE — THERAPY PROGRESS REPORT/RE-CERT
Outpatient Physical Therapy Ortho Progress Note  [facility]     Patient Name: Funmilayo Concepcion  : 1970  MRN: [id number]  Today's Date: 11/3/2022      Visit Date: 2022    Visit Dx:    ICD-10-CM ICD-9-CM   1. S/P revision of total knee, right  Z96.651 V43.65   2. Orthopedic aftercare  Z47.89 V54.9       Patient Active Problem List   Diagnosis   • S/P ACL reconstruction   • Pain and swelling of knee   • Aseptic loosening of prosthetic knee (HCC)   • Mechanical loosening of prosthetic knee, initial encounter ([facility])        Past Medical History:   Diagnosis Date   • Arthritis    • Hypertension    • Right knee pain         Past Surgical History:   Procedure Laterality Date   • ACHILLES TENDON REPAIR Left     X2   • FOOT SURGERY Right     HEEL SPURS   • HYSTERECTOMY     • JOINT REPLACEMENT Right     KNEE ARTHROPLASTY   • KNEE ACL RECONSTRUCTION  2016   • TOTAL KNEE ARTHROPLASTY REVISION Right 2022    Procedure: TOTAL KNEE ARTHROPLASTY REVISION;  Surgeon: Kevin Green MD;  Location: [facility] OR [facility] [place];  Service: Orthopedics;  Laterality: Right;        PT Ortho     Row Name 22 0900       Right Lower Ext    Rt Knee Extension/Flexion AROM lacking 3-113  -ANDREW          User Key  (r) = Recorded By, (t) = Taken By, (c) = Cosigned By    Initials Name Provider Type    Fernanda Gilliam, PT Physical Therapist                             PT Assessment/Plan     Row Name 22 1000          PT Assessment    Functional Limitations Decreased safety during functional activities;Impaired gait;Limitations in community activities;Limitations in functional capacity and performance;Performance in leisure activities;Performance in sport activities;Performance in work activities  -ANDREW     Impairments Balance;Endurance;Gait;Impaired flexibility;Joint mobility;Locomotion;Muscle strength;Pain;Poor body mechanics;Range of motion  -ANDREW     Assessment Comments Funmilayo Concepcion has been seen for 8 physical therapy  sessions for s/p revision of R TKR on 9/8/22 (8 weeks). Treatment has included therapeutic exercise, manual therapy and patient education with home exercise program . Progress to physical therapy goals is good. Pt has met 3/3 STG and 1/5 LTG and progressing toward remaining goals. Patient reports overall 50% improvement since start of PT with lingering pain/difficulty achieving end range knee flexion and when descending stairs. She also reports her R knee functional ability is 60% in comparison to contralateral side. Her AROM has significantly improved from lacking 4-92 degrees of motion to lacking 3-113 degrees. She also demonstrates improved gait mechanics and ability to ascend/decend stairs with reciprocal pattern with mild increased pain. Continued focus on R knee ROM and functional strength with addition of forward lunge with trunk rotation. She is quick to fatigue with quadriceps strengthening based exercises. She will benefit from continued skilled physical therapy to address remaining impairments and functional limitations.  -ANDREW     Please refer to paper survey for additional self-reported information Yes  -ANDREW     Rehab Potential Good  -ANDREW     Patient/caregiver participated in establishment of treatment plan and goals Yes  -ANDREW     Patient would benefit from skilled therapy intervention Yes  -ANDREW        PT Plan    PT Frequency 2x/week;1x/week  -ANDREW     Predicted Duration of Therapy Intervention (PT) 6-10 visits  -ANDREW     Planned CPT's? PT RE-EVAL: 39834;PT THER PROC EA 15 MIN: 58784;PT THER ACT EA 15 MIN: 26402;PT MANUAL THERAPY EA 15 MIN: 72330;PT NEUROMUSC RE-EDUCATION EA 15 MIN: 14707;PT GAIT TRAINING EA 15 MIN: 50363;PT SELF CARE/HOME MGMT/TRAIN EA 15: 96913;PT HOT OR COLD PACK TREAT MCARE  -ANDREW     PT Plan Comments RDL?, SLS  -ANDREW           User Key  (r) = Recorded By, (t) = Taken By, (c) = Cosigned By    Initials Name Provider Type    Fernanda Gilliam, PT Physical Therapist                   OP  Exercises     Row Name 11/03/22 0900             Subjective Comments    Subjective Comments I am doing good  -ANDREW         Total Minutes    03858 - PT Therapeutic Exercise Minutes 31  -ANDREW      21021 - PT Therapeutic Activity Minutes 10  -ANDREW         Exercise 2    Exercise Name 2 rec bike  -ANDREW      Sets 2 verbal;  -ANDREW      Time 2 5 min  -ANDREW      Additional Comments seat 6  -ANDREW         Exercise 3    Exercise Name 3 standing gastroc stretch  -ANDREW      Cueing 3 Verbal;Demo  -ANDREW      Reps 3 3  -ANDREW      Time 3 20s  -ANDREW      Additional Comments stairs  -ANDREW         Exercise 4    Exercise Name 4 standing knee flexion stretch  -ANDREW      Cueing 4 Verbal;Demo  -ANDREW      Sets 4 1  -ANDREW      Reps 4 10  -ANDREW      Time 4 10s  -ANDREW         Exercise 5    Exercise Name 5 standing HS stretch  -ANDREW      Cueing 5 Verbal;Demo  -ANDREW      Reps 5 3  -ANDREW      Time 5 20s  -ANDREW      Additional Comments stairs  -ANDREW         Exercise 6    Exercise Name 6 Time spent filling out survey, assessing stairs, taking ROM and discusses POC/goals  -ANDREW      Time 6 10 mins  -ANDREW         Exercise 9    Exercise Name 9 fwd lunge + trunk rot  -ANDREW      Cueing 9 Verbal;Demo  -ANDREW      Sets 9 1  -ANDREW      Reps 9 15  -ANDREW      Time 9 L3  -ANDREW         Exercise 13    Exercise Name 13 step up + knee   -ANDREW      Cueing 13 Verbal;Demo  -ANDREW      Sets 13 1  -ANDREW      Reps 13 15  -ANDREW      Time 13 fwd/lat  -ANDREW      Additional Comments 8in  -ANDREW         Exercise 14    Exercise Name 14 transverse lunge  -ANDREW      Cueing 14 Verbal;Demo  -ANDREW      Reps 14 10ea  -ANDREW         Exercise 15    Exercise Name 15 leg press, RLE  -ANDREW      Cueing 15 Verbal;Demo  -ANDREW      Sets 15 2  -ANDREW      Reps 15 15  -ANDREW      Time 15 120#  -ANDREW         Exercise 16    Exercise Name 16 lateral stepping  -ANDREW      Cueing 16 Verbal  -ANDREW      Reps 16 3 laps  -ANDREW      Time 16 GTB  -ANDREW         Exercise 17    Exercise Name 17 monster walk, F/B  -ANDREW      Cueing 17 Verbal;Demo  -ANDREW      Reps 17 3 laps  -ANDREW      Time 17 GTB  -ANDREW          Exercise 18    Exercise Name 18 quad burner - SLS on airex  -      Cueing 18 Verbal;Demo  -ANDREW      Sets 18 3  -ANDREW      Reps 18 30sec  -ANDREW         Exercise 20    Exercise Name 20 step downs  -      Cueing 20 Verbal;Demo  -ANDREW      Sets 20 1  -ANDREW      Reps 20 15  -ANDREW      Additional Comments 4in  -ANDREW            User Key  (r) = Recorded By, (t) = Taken By, (c) = Cosigned By    Initials Name Provider Type    Fernanda Gilliam PT Physical Therapist                              PT OP Goals     Row Name 11/03/22 0900          PT Short Term Goals    STG Date to Achieve 11/04/22  -     STG 1 Pt will be independent with initial HEP to improve strength/ROM and decrease pain.  -     STG 1 Progress Met  -     STG 2 Pt will demonstrate improved R knee flexion AROM from 100 deg to 110 deg or better.  -     STG 2 Progress Met  -     STG 2 Progress Comments lacking 3-113  -     STG 3 Pt will be able to ascend/descend stairs in a reciprocal pattern with</= 3/10 pain.  -     STG 3 Progress Met  -        Long Term Goals    LTG Date to Achieve 12/04/22  -     LTG 1 Pt will be independent with advance HEP to improve strength/ROM and decrease pain.  -     LTG 1 Progress Progressing  -     LTG 2 Pt will demonstrate normalized gait mechanics without AD.  -     LTG 2 Progress Met  -     LTG 3 Pt will improve KOS score to 75% to show improved quality of life.  -     LTG 3 Progress Ongoing;Progressing  -     LTG 3 Progress Comments 68%  -     LTG 4 Pt will improve R knee AROM from lacking 4-92 to at least 0-120 deg to improve ability to navigate stairs.  -     LTG 4 Progress Ongoing;Progressing  -     LTG 4 Progress Comments lacking 3-113  -     LTG 5 Pt will improve B LE strength to at least 5/5.  -     LTG 5 Progress Progressing  -           User Key  (r) = Recorded By, (t) = Taken By, (c) = Cosigned By    Initials Name Provider Type    Fernanda Gilliam, SHASHANK Physical  Therapist                Therapy Education  Given: HEP, Symptoms/condition management, Pain management  Program: Reinforced, Progressed  How Provided: Verbal, Demonstration  Provided to: Patient  Level of Understanding: Verbalized, Demonstrated    Outcome Measure Options: Knee Outcome Score- ADL  Knee Outcome Survey Activities of Daily Living Scale  Symptoms: Pain: The symptom affects my activity moderately  Symptoms: Stiffness: The symptom affects my activity slightly  Symptoms: Swelling: The symptom affects my activity slightly  Symptoms: Giving way, buckling, or shifting of the knee: The symptom affects my activity moderately  Symptoms: Weakness: I have the symptom, but it does not affect my activity  Symptoms: Limping: The symptom affects my activity slightly  Functional Limitations with ADL's: Walk: Activity is minimally difficult  Functional Limitations with ADL's: Go up stairs: Activity is somewhat difficult  Functional Limitations with ADL's: Go down stairs: Activity is minimally difficult  Functional Limitations with ADL's: Stand: Activity is not difficult  Functional Limitations with ADL's: Kneel on front of your knee: Activity is somewhat difficult  Functional Limitations with ADL's: Squat: Activity is minimally difficult  Functional Limitations with ADL's: Sit with your knee bent: Activity is minimally difficult  Functional Limitations with ADL's: Rise from a chair: Activity is minimally difficult  Knee Outcome Summary ADL's Score: 68.57 %      Time Calculation:   Start Time: 0919  Stop Time: 1000  Time Calculation (min): 41 min  Timed Charges  20615 - PT Therapeutic Exercise Minutes: 31  39632 - PT Therapeutic Activity Minutes: 10  Total Minutes  Timed Charges Total Minutes: 41   Total Minutes: 41  Therapy Charges for Today     Code Description Service Date Service Provider Modifiers Qty    87367678527  PT THER PROC EA 15 MIN 11/3/2022 Fernanda Calderon, PT GP 2    [id number]  PT THERAPEUTIC  ACT EA 15 MIN 11/3/2022 Fernanda Calderon, PT GP 1          PT G-Codes  Outcome Measure Options: Knee Outcome Score- ADL         Fernanda Calderon, PT  11/3/2022

## 2022-11-08 ENCOUNTER — HOSPITAL ENCOUNTER (OUTPATIENT)
Dept: PHYSICAL THERAPY | Facility: HOSPITAL | Age: 52
Setting detail: THERAPIES SERIES
Discharge: HOME | End: 2022-11-08

## 2022-11-08 DIAGNOSIS — M25.561 ACUTE PAIN OF RIGHT KNEE: ICD-10-CM

## 2022-11-08 DIAGNOSIS — Z47.89 ORTHOPEDIC AFTERCARE: ICD-10-CM

## 2022-11-08 DIAGNOSIS — Z96.651 S/P TKR (TOTAL KNEE REPLACEMENT), RIGHT: ICD-10-CM

## 2022-11-08 DIAGNOSIS — Z96.651 S/P REVISION OF TOTAL KNEE, RIGHT: Primary | ICD-10-CM

## 2022-11-08 PROCEDURE — 97140 MANUAL THERAPY 1/> REGIONS: CPT

## 2022-11-08 PROCEDURE — 97110 THERAPEUTIC EXERCISES: CPT

## 2022-11-08 NOTE — THERAPY TREATMENT NOTE
Outpatient Physical Therapy Ortho Treatment Note  [facility]     Patient Name: Funmilayo Concepcion  : 1970  MRN: [id number]  Today's Date: 2022      Visit Date: 2022    Visit Dx:    ICD-10-CM ICD-9-CM   1. S/P revision of total knee, right  Z96.651 V43.65   2. Orthopedic aftercare  Z47.89 V54.9   3. S/P TKR (total knee replacement), right  Z96.651 V43.65   4. Acute pain of right knee  M25.561 719.46       Patient Active Problem List   Diagnosis   • S/P ACL reconstruction   • Pain and swelling of knee   • Aseptic loosening of prosthetic knee (HCC)   • Mechanical loosening of prosthetic knee, initial encounter ([facility])        Past Medical History:   Diagnosis Date   • Arthritis    • Hypertension    • Right knee pain         Past Surgical History:   Procedure Laterality Date   • ACHILLES TENDON REPAIR Left     X2   • FOOT SURGERY Right     HEEL SPURS   • HYSTERECTOMY     • JOINT REPLACEMENT Right     KNEE ARTHROPLASTY   • KNEE ACL RECONSTRUCTION  2016   • TOTAL KNEE ARTHROPLASTY REVISION Right 2022    Procedure: TOTAL KNEE ARTHROPLASTY REVISION;  Surgeon: Kevin Green MD;  [place] [facility] [place] [facility] [place]  Service: Orthopedics;  Laterality: Right;                        PT Assessment/Plan     Row Name 22 1033          PT Assessment    Assessment Comments Ms. Concepcion returns to clinic, no new complaints though persistent feeling of stiffness and pain medially. Perfomed gentle manual in an effort to improve pt. mobility and added standing quad stretch with reasonable tolerance. Ther ex focused on multi-planar movement with added lateral lunges to prepare for return to sport. Pt. plans to return to work tomorrow, she will continue to benefit from skilled PT.  -        PT Plan    PT Plan Comments RDL, SLS?  -           User Key  (r) = Recorded By, (t) = Taken By, (c) = Cosigned By    Initials Name Provider Type    Funmilayo Huff, PT Physical Therapist                   OP Exercises     Row  Name 11/08/22 0900             Subjective Comments    Subjective Comments Its just so stiff, I go back to work tomorrow  -         Total Minutes    84721 - PT Therapeutic Exercise Minutes 30  -MH      63210 - PT Manual Therapy Minutes 8  -MH         Exercise 2    Exercise Name 2 rec bike  -MH      Cueing 2 Verbal;Demo  -MH      Time 2 5 min  -MH         Exercise 3    Exercise Name 3 standing gastroc stretch  -MH      Cueing 3 Verbal;Demo  -MH      Reps 3 3  -MH      Time 3 20s  -MH      Additional Comments stairs  -MH         Exercise 4    Exercise Name 4 standing knee flexion stretch  -MH      Cueing 4 Verbal;Demo  -MH      Sets 4 1  -MH      Reps 4 10  -MH      Time 4 10s  -MH         Exercise 5    Exercise Name 5 standing HS stretch  -MH      Cueing 5 Verbal;Demo  -MH      Reps 5 3  -MH      Time 5 20s  -MH      Additional Comments stairs  -MH         Exercise 6    Exercise Name 6 quad stretch  -MH      Cueing 6 Verbal;Demo  -MH      Reps 6 3  -MH      Time 6 20sec  -MH      Additional Comments LE resting on table  -         Exercise 9    Exercise Name 9 fwd lunge + trunk rot  -MH      Cueing 9 Verbal;Demo  -      Sets 9 1  -MH      Reps 9 15  -MH      Time 9 L3  -MH         Exercise 15    Exercise Name 15 leg press, RLE  -MH      Cueing 15 Verbal;Demo  -      Sets 15 2  -MH      Reps 15 15  -MH      Time 15 130#  -MH         Exercise 16    Exercise Name 16 alternating lateral lunge  -MH      Cueing 16 Verbal;Demo  -MH      Reps 16 10e  -MH      Time 16 L2 med ball  -MH         Exercise 18    Exercise Name 18 quad burner - SLS on airex  -MH      Cueing 18 Verbal;Demo  -      Sets 18 3  -MH      Reps 18 30sec  -MH            User Key  (r) = Recorded By, (t) = Taken By, (c) = Cosigned By    Initials Name Provider Type    MH Funmilayo Yang, PT Physical Therapist                         Manual Rx (last 36 hours)     Manual Treatments     Row Name 11/08/22 0900             Total Minutes    60325 - PT Manual  Therapy Minutes 8  -         Manual Rx 1    Manual Rx 1 Location L knee distraction/flexion mob with belt  -         Manual Rx 2    Manual Rx 2 Location STM distal quad/hamstrings  -      Manual Rx 2 Type patellar mobs  -            User Key  (r) = Recorded By, (t) = Taken By, (c) = Cosigned By    Initials Name Provider Type     Funmilayo Yang, PT Physical Therapist                 PT OP Goals     Row Name 11/08/22 1000          PT Short Term Goals    STG Date to Achieve 11/04/22  -     STG 1 Pt will be independent with initial HEP to improve strength/ROM and decrease pain.  -     STG 1 Progress Met  -     STG 2 Pt will demonstrate improved R knee flexion AROM from 100 deg to 110 deg or better.  -     STG 2 Progress Met  -     STG 3 Pt will be able to ascend/descend stairs in a reciprocal pattern with</= 3/10 pain.  -     STG 3 Progress Met  -        Long Term Goals    LTG Date to Achieve 12/04/22  -     LTG 1 Pt will be independent with advance HEP to improve strength/ROM and decrease pain.  -     LTG 1 Progress Progressing  -     LTG 2 Pt will demonstrate normalized gait mechanics without AD.  -     LTG 2 Progress Met  -     LTG 3 Pt will improve KOS score to 75% to show improved quality of life.  -     LTG 3 Progress Ongoing;Progressing  -     LTG 4 Pt will improve R knee AROM from lacking 4-92 to at least 0-120 deg to improve ability to navigate stairs.  -     LTG 4 Progress Ongoing;Progressing  -     LTG 5 Pt will improve B LE strength to at least 5/5.  -     LTG 5 Progress Progressing  -           User Key  (r) = Recorded By, (t) = Taken By, (c) = Cosigned By    Initials Name Provider Type     Funmilayo Yang, PT Physical Therapist                Therapy Education  Education Details: issued updated schedule  Given: Symptoms/condition management  Program: Reinforced  How Provided: Verbal, Demonstration  Provided to: Patient  Level of Understanding: Verbalized,  Demonstrated              Time Calculation:   Start Time: 0950  Stop Time: 1028  Time Calculation (min): 38 min  Total Timed Code Minutes- PT: 39 minute(s)  Timed Charges  33747 - PT Therapeutic Exercise Minutes: 30  86218 - PT Manual Therapy Minutes: 8  Total Minutes  Timed Charges Total Minutes: 38   Total Minutes: 38  Therapy Charges for Today     Code Description Service Date Service Provider Modifiers Qty    [id number]  PT THER PROC EA 15 MIN 11/8/2022 Funmilayo Yang, PT GP 2    [id number]  PT MANUAL THERAPY EA 15 MIN 11/8/2022 Funmilayo Yang, PT GP 1                    Funmilayo Yang PT  11/8/2022

## 2022-11-10 ENCOUNTER — HOSPITAL ENCOUNTER (OUTPATIENT)
Dept: PHYSICAL THERAPY | Facility: HOSPITAL | Age: 52
Setting detail: THERAPIES SERIES
Discharge: HOME | End: 2022-11-10

## 2022-11-10 DIAGNOSIS — Z47.89 ORTHOPEDIC AFTERCARE: ICD-10-CM

## 2022-11-10 DIAGNOSIS — Z96.651 S/P REVISION OF TOTAL KNEE, RIGHT: Primary | ICD-10-CM

## 2022-11-10 PROCEDURE — 97140 MANUAL THERAPY 1/> REGIONS: CPT

## 2022-11-10 PROCEDURE — 97110 THERAPEUTIC EXERCISES: CPT

## 2022-11-10 NOTE — THERAPY TREATMENT NOTE
Outpatient Physical Therapy Ortho Treatment Note  [facility]     Patient Name: Funmilayo Concepcion  : 1970  MRN: [id number]  Today's Date: 11/10/2022      Visit Date: 11/10/2022    Visit Dx:    ICD-10-CM ICD-9-CM   1. S/P revision of total knee, right  Z96.651 V43.65   2. Orthopedic aftercare  Z47.89 V54.9       Patient Active Problem List   Diagnosis   • S/P ACL reconstruction   • Pain and swelling of knee   • Aseptic loosening of prosthetic knee (HCC)   • Mechanical loosening of prosthetic knee, initial encounter ([facility])        Past Medical History:   Diagnosis Date   • Arthritis    • Hypertension    • Right knee pain         Past Surgical History:   Procedure Laterality Date   • ACHILLES TENDON REPAIR Left     X2   • FOOT SURGERY Right     HEEL SPURS   • HYSTERECTOMY     • JOINT REPLACEMENT Right     KNEE ARTHROPLASTY   • KNEE ACL RECONSTRUCTION  2016   • TOTAL KNEE ARTHROPLASTY REVISION Right 2022    Procedure: TOTAL KNEE ARTHROPLASTY REVISION;  Surgeon: Kevin Green MD;  Location: [facility] OR [facility];  Service: Orthopedics;  Laterality: Right;        PT Ortho     Row Name 11/10/22 1600       Right Lower Ext    Rt Knee Extension/Flexion AROM 0-3-120  -DC          User Key  (r) = Recorded By, (t) = Taken By, (c) = Cosigned By    Initials Name Provider Type    Chilango Esteves, SHASHANK Physical Therapist                             PT Assessment/Plan     Row Name 11/10/22 1500          PT Assessment    Assessment Comments Pt is now 9 weeks s/p Right TKA revision on 22. Pt is now two days back to work, and voices she's had to a lot of walking. We continued with current therapueitc exercises and manual therapy techniques with heavy focus on PROM knee flexion. Added in single leg balance to further challenge her static balance and RDLs to improve her hamstring strength. Post therapy session after manual therapy, pt's knee AROM measured 0-3-120. Pt showing good progress overall. Pt would continue to  benefit from skilled PT.  -DC        PT Plan    PT Plan Comments Continue with manual. Consider sidestepping and standing clams to improve lateral glut strength.  -DC           User Key  (r) = Recorded By, (t) = Taken By, (c) = Cosigned By    Initials Name Provider Type    DC Chilango Jimenes, PT Physical Therapist                   OP Exercises     Row Name 11/10/22 1500             Subjective Comments    Subjective Comments Pt reports today was her second day back to work and is having to walk a lot.  -DC         Total Minutes    48202 - PT Therapeutic Exercise Minutes 32  -DC      12969 - PT Manual Therapy Minutes 12  -DC         Exercise 2    Exercise Name 2 rec bike  -DC      Cueing 2 Verbal;Demo  -DC      Time 2 5 min lvl 5  -DC         Exercise 3    Exercise Name 3 standing gastroc stretch  -DC      Cueing 3 Verbal;Demo  -DC      Reps 3 3  -DC      Time 3 20s  -DC      Additional Comments at step  -DC         Exercise 4    Exercise Name 4 standing knee flexion stretch  -DC      Cueing 4 Verbal;Demo  -DC      Sets 4 1  -DC      Reps 4 10  -DC      Time 4 10s  -DC      Additional Comments at step  -DC         Exercise 5    Exercise Name 5 standing HS stretch  -DC      Cueing 5 Verbal;Demo  -DC      Reps 5 3  -DC      Time 5 20s  -DC      Additional Comments at step  -DC         Exercise 6    Exercise Name 6 quad stretch  -DC      Cueing 6 Verbal;Demo  -DC      Reps 6 3  -DC      Time 6 20sec  -DC      Additional Comments prone with green strap (pt had cramps with standing quad stretch)  -DC         Exercise 9    Exercise Name 9 fwd lunge + trunk rot  -DC      Cueing 9 Verbal;Demo  -DC      Sets 9 1  -DC      Reps 9 15  -DC      Time 9 L3  -DC      Additional Comments cuing to avoid knee valgus  -DC         Exercise 14    Exercise Name 14 RDL  -DC      Cueing 14 Verbal;Demo  -DC      Sets 14 1  -DC      Reps 14 15  -DC      Time 14 Dowel with 5# strapped around  -DC         Exercise 15    Exercise Name 15 leg  press, RLE  -DC      Cueing 15 Verbal;Demo  -DC      Sets 15 2  -DC      Reps 15 15  -DC      Time 15 130#  -DC         Exercise 16    Exercise Name 16 alternating lateral lunge  -DC      Cueing 16 Verbal;Demo  -DC      Reps 16 15 each  -DC      Time 16 L2 med ball  -DC      Additional Comments cuing to avoid knee valgus  -DC         Exercise 18    Exercise Name 18 quad burner - SLS on airex  -DC      Cueing 18 Verbal;Demo  -DC      Sets 18 3  -DC      Reps 18 30sec  -DC            User Key  (r) = Recorded By, (t) = Taken By, (c) = Cosigned By    Initials Name Provider Type    Chilango Esteves, PT Physical Therapist                         Manual Rx (last 36 hours)     Manual Treatments     Row Name 11/10/22 1500             Total Minutes    12254 - PT Manual Therapy Minutes 12  -DC         Manual Rx 1    Manual Rx 1 Location PROM flexion>extension  -DC         Manual Rx 2    Manual Rx 2 Location STM distal quad/hamstrings  -DC            User Key  (r) = Recorded By, (t) = Taken By, (c) = Cosigned By    Initials Name Provider Type    Chilango Esteves, PT Physical Therapist                 PT OP Goals     Row Name 11/10/22 1500          PT Short Term Goals    STG Date to Achieve 11/04/22  -DC     STG 1 Pt will be independent with initial HEP to improve strength/ROM and decrease pain.  -DC     STG 1 Progress Met  -DC     STG 2 Pt will demonstrate improved R knee flexion AROM from 100 deg to 110 deg or better.  -DC     STG 2 Progress Met  -DC     STG 3 Pt will be able to ascend/descend stairs in a reciprocal pattern with</= 3/10 pain.  -DC     STG 3 Progress Met  -DC        Long Term Goals    LTG Date to Achieve 12/04/22  -DC     LTG 1 Pt will be independent with advance HEP to improve strength/ROM and decrease pain.  -DC     LTG 1 Progress Progressing  -DC     LTG 2 Pt will demonstrate normalized gait mechanics without AD.  -DC     LTG 2 Progress Met  -DC     LTG 3 Pt will improve KOS score to 75% to show  improved quality of life.  -DC     LTG 3 Progress Ongoing;Progressing  -DC     LTG 4 Pt will improve R knee AROM from lacking 4-92 to at least 0-120 deg to improve ability to navigate stairs.  -DC     LTG 4 Progress Ongoing;Progressing  -DC     LTG 4 Progress Comments Measured 0-3-120 today.  -DC     LTG 5 Pt will improve B LE strength to at least 5/5.  -DC     LTG 5 Progress Progressing  -DC           User Key  (r) = Recorded By, (t) = Taken By, (c) = Cosigned By    Initials Name Provider Type    Chilango Esteves, PT Physical Therapist                Therapy Education  Given: HEP, Symptoms/condition management  Program: Reinforced  How Provided: Verbal  Provided to: Patient  Level of Understanding: Verbalized              Time Calculation:   Start Time: 1546  Stop Time: 1630  Time Calculation (min): 44 min  Total Timed Code Minutes- PT: 44 minute(s)  Timed Charges  05402 - PT Therapeutic Exercise Minutes: 32  78146 - PT Manual Therapy Minutes: 12  Total Minutes  Timed Charges Total Minutes: 44   Total Minutes: 44  Therapy Charges for Today     Code Description Service Date Service Provider Modifiers Qty    39949062678 HC PT THER PROC EA 15 MIN 11/10/2022 Chilango Jimenes, PT GP 2    [id number] HC PT MANUAL THERAPY EA 15 MIN 11/10/2022 Chilango Jimenes, PT GP 1                    Chilango Jimenes PT  11/10/2022

## 2022-11-14 ENCOUNTER — HOSPITAL ENCOUNTER (OUTPATIENT)
Dept: PHYSICAL THERAPY | Facility: HOSPITAL | Age: 52
Setting detail: THERAPIES SERIES
Discharge: HOME | End: 2022-11-14

## 2022-11-14 DIAGNOSIS — Z96.651 S/P REVISION OF TOTAL KNEE, RIGHT: Primary | ICD-10-CM

## 2022-11-14 DIAGNOSIS — Z47.89 ORTHOPEDIC AFTERCARE: ICD-10-CM

## 2022-11-14 PROCEDURE — 97110 THERAPEUTIC EXERCISES: CPT

## 2022-11-14 PROCEDURE — 97140 MANUAL THERAPY 1/> REGIONS: CPT

## 2022-11-14 NOTE — THERAPY TREATMENT NOTE
Outpatient Physical Therapy Ortho Treatment Note  [facility]     Patient Name: Funmilayo Concepcion  : 1970  MRN: [id number]  Today's Date: 2022      Visit Date: 2022    Visit Dx:    ICD-10-CM ICD-9-CM   1. S/P revision of total knee, right  Z96.651 V43.65   2. Orthopedic aftercare  Z47.89 V54.9       Patient Active Problem List   Diagnosis   • S/P ACL reconstruction   • Pain and swelling of knee   • Aseptic loosening of prosthetic knee (HCC)   • Mechanical loosening of prosthetic knee, initial encounter ([facility])        Past Medical History:   Diagnosis Date   • Arthritis    • Hypertension    • Right knee pain         Past Surgical History:   Procedure Laterality Date   • ACHILLES TENDON REPAIR Left     X2   • FOOT SURGERY Right     HEEL SPURS   • HYSTERECTOMY     • JOINT REPLACEMENT Right     KNEE ARTHROPLASTY   • KNEE ACL RECONSTRUCTION  2016   • TOTAL KNEE ARTHROPLASTY REVISION Right 2022    Procedure: TOTAL KNEE ARTHROPLASTY REVISION;  Surgeon: Kevin Green MD;  [place] [facility] [place] [facility] [place]: [place]opedics;  Laterality: Right;                        PT Assessment/Plan     Row Name 22 1500          PT Assessment    Assessment Comments Continued with manual therapy and focused on current therapeutic exercises. Pt required VC/TCs to avoid knee valgus with RDLs, leg press and lunges to better engagae her gluts. Progressed to include standing clamshells to further engage her Right gluts. Pt would continue to benefit from skilled PT.  -DC        PT Plan    PT Plan Comments Consider decreasing MT time and adding in side stepping or adding back in step ups.  -DC           User Key  (r) = Recorded By, (t) = Taken By, (c) = Cosigned By    Initials Name Provider Type    Chilango Esteves, PT Physical Therapist                   OP Exercises     Row Name 22 1500             Subjective Comments    Subjective Comments Pt states she sat at her desk all day and then driving over  here, so her knee is stiff.  -DC         Total Minutes    08597 - PT Therapeutic Exercise Minutes 34  -DC      20965 - PT Manual Therapy Minutes 8  -DC         Exercise 2    Exercise Name 2 rec bike  -DC      Cueing 2 Verbal;Demo  -DC      Time 2 5 min lvl 5  -DC         Exercise 3    Exercise Name 3 standing gastroc stretch  -DC      Cueing 3 Verbal;Demo  -DC      Reps 3 3  -DC      Time 3 20s  -DC         Exercise 4    Exercise Name 4 standing knee flexion stretch  -DC      Cueing 4 Verbal;Demo  -DC      Sets 4 1  -DC      Reps 4 10  -DC      Time 4 10s  -DC      Additional Comments at step  -DC         Exercise 5    Exercise Name 5 standing HS stretch  -DC      Cueing 5 Verbal;Demo  -DC      Reps 5 3  -DC      Time 5 20s  -DC      Additional Comments at step  -DC         Exercise 6    Exercise Name 6 quad stretch  -DC      Cueing 6 Verbal;Demo  -DC      Reps 6 3  -DC      Time 6 20sec  -DC      Additional Comments prone with green strap (pt had cramps with standing quad stretch)  -DC         Exercise 9    Exercise Name 9 fwd lunge + trunk rot  -DC      Cueing 9 Verbal;Demo  -DC      Sets 9 1  -DC      Reps 9 15  -DC      Time 9 L3  -DC      Additional Comments cuing to avoid knee valgus  -DC         Exercise 14    Exercise Name 14 RDL  -DC      Cueing 14 Verbal;Demo  -DC      Sets 14 1  -DC      Reps 14 15  -DC      Time 14 Dowel with 5# strapped around  -DC      Additional Comments cuing to avoid knee valgus  -DC         Exercise 15    Exercise Name 15 leg press, RLE  -DC      Cueing 15 Verbal;Demo  -DC      Sets 15 2  -DC      Reps 15 15  -DC      Time 15 Seat 11; 140#  -DC      Additional Comments cuing to avoid knee valgus  -DC         Exercise 16    Exercise Name 16 alternating lateral lunge  -DC      Cueing 16 Verbal;Demo  -DC      Reps 16 15 each  -DC      Time 16 L2 med ball  -DC      Additional Comments cuing to avoid knee valgus  -DC         Exercise 18    Exercise Name 18 quad burner - SLS on airex   -DC      Cueing 18 Verbal;Demo  -DC      Sets 18 3  -DC      Reps 18 30sec  -DC         Exercise 19    Exercise Name 19 Standing clamshells  -DC      Cueing 19 Verbal;Tactile;Demo  -DC      Sets 19 2  -DC      Reps 19 10  -DC      Time 19 GTB above knees  -DC            User Key  (r) = Recorded By, (t) = Taken By, (c) = Cosigned By    Initials Name Provider Type    Chilango Esteves, PT Physical Therapist                         Manual Rx (last 36 hours)     Manual Treatments     Row Name 11/14/22 1500             Total Minutes    53063 - PT Manual Therapy Minutes 8  -DC         Manual Rx 1    Manual Rx 1 Location PROM flexion>extension  -DC         Manual Rx 2    Manual Rx 2 Location STM distal quad/hamstrings  -DC            User Key  (r) = Recorded By, (t) = Taken By, (c) = Cosigned By    Initials Name Provider Type    Chilango Esteves, PT Physical Therapist                 PT OP Goals     Row Name 11/14/22 1600          PT Short Term Goals    STG Date to Achieve 11/04/22  -DC     STG 1 Pt will be independent with initial HEP to improve strength/ROM and decrease pain.  -DC     STG 1 Progress Met  -DC     STG 2 Pt will demonstrate improved R knee flexion AROM from 100 deg to 110 deg or better.  -DC     STG 2 Progress Met  -DC     STG 3 Pt will be able to ascend/descend stairs in a reciprocal pattern with</= 3/10 pain.  -DC     STG 3 Progress Met  -DC        Long Term Goals    LTG Date to Achieve 12/04/22  -DC     LTG 1 Pt will be independent with advance HEP to improve strength/ROM and decrease pain.  -DC     LTG 1 Progress Progressing  -DC     LTG 2 Pt will demonstrate normalized gait mechanics without AD.  -DC     LTG 2 Progress Met  -DC     LTG 3 Pt will improve KOS score to 75% to show improved quality of life.  -DC     LTG 3 Progress Ongoing;Progressing  -DC     LTG 4 Pt will improve R knee AROM from lacking 4-92 to at least 0-120 deg to improve ability to navigate stairs.  -DC     LTG 4 Progress  Ongoing;Progressing  -DC     LTG 5 Pt will improve B LE strength to at least 5/5.  -DC     LTG 5 Progress Progressing  -DC           User Key  (r) = Recorded By, (t) = Taken By, (c) = Cosigned By    Initials Name Provider Type    Chilango Esteves, PT Physical Therapist                Therapy Education  Given: HEP, Symptoms/condition management  Program: Reinforced  How Provided: Verbal  Provided to: Patient  Level of Understanding: Verbalized              Time Calculation:   Start Time: 1535  Stop Time: 1617  Time Calculation (min): 42 min  Total Timed Code Minutes- PT: 42 minute(s)  Timed Charges  16672 - PT Therapeutic Exercise Minutes: 34  60113 - PT Manual Therapy Minutes: 8  Total Minutes  Timed Charges Total Minutes: 42   Total Minutes: 42  Therapy Charges for Today     Code Description Service Date Service Provider Modifiers Qty    64086883029 HC PT THER PROC EA 15 MIN 11/14/2022 Chilango Jimenes, PT GP 2    77172790421 HC PT MANUAL THERAPY EA 15 MIN 11/14/2022 Chilango Jimenes, PT GP 1                    Chilango Jimenes, PT  11/14/2022

## 2022-11-15 ENCOUNTER — APPOINTMENT (OUTPATIENT)
Dept: PHYSICAL THERAPY | Facility: HOSPITAL | Age: 52
End: 2022-11-15

## 2022-11-16 ENCOUNTER — HOSPITAL ENCOUNTER (OUTPATIENT)
Dept: PHYSICAL THERAPY | Facility: HOSPITAL | Age: 52
Setting detail: THERAPIES SERIES
Discharge: HOME | End: 2022-11-16

## 2022-11-16 DIAGNOSIS — Z47.89 ORTHOPEDIC AFTERCARE: ICD-10-CM

## 2022-11-16 DIAGNOSIS — Z96.651 S/P REVISION OF TOTAL KNEE, RIGHT: Primary | ICD-10-CM

## 2022-11-16 PROCEDURE — 97110 THERAPEUTIC EXERCISES: CPT

## 2022-11-16 NOTE — THERAPY TREATMENT NOTE
Outpatient Physical Therapy Ortho Treatment Note  [facility]     Patient Name: Funmilayo Concepcion  : 1970  MRN: [id number]  Today's Date: 2022      Visit Date: 2022    Visit Dx:    ICD-10-CM ICD-9-CM   1. S/P revision of total knee, right  Z96.651 V43.65   2. Orthopedic aftercare  Z47.89 V54.9       Patient Active Problem List   Diagnosis   • S/P ACL reconstruction   • Pain and swelling of knee   • Aseptic loosening of prosthetic knee (HCC)   • Mechanical loosening of prosthetic knee, initial encounter ([facility])        Past Medical History:   Diagnosis Date   • Arthritis    • Hypertension    • Right knee pain         Past Surgical History:   Procedure Laterality Date   • ACHILLES TENDON REPAIR Left     X2   • FOOT SURGERY Right     HEEL SPURS   • HYSTERECTOMY     • JOINT REPLACEMENT Right     KNEE ARTHROPLASTY   • KNEE ACL RECONSTRUCTION  2016   • TOTAL KNEE ARTHROPLASTY REVISION Right 2022    Procedure: TOTAL KNEE ARTHROPLASTY REVISION;  Surgeon: Kevin Green MD;  [place] [facility] [place] [facility] [place]  Service: Orthopedics;  Laterality: Right;                        PT Assessment/Plan     Row Name 22 1600          PT Assessment    Assessment Comments Ms. Concepcion arrives to PT reporting R knee stiffness due to sitting for majority of her day while at work. Discussed various techniques to reduce stiffness despite being required to sit throughout her day. Held on MT to focus on strengthening and patient quick to fatigue with quadriceps/glute exercises. She continues to demo intermittent genu valgus with SLS exercises and benefits from reintroducing B hip abduction strengthening. Added theraband pulling patient into genu valgus to increase hip abduction activation with quad burner and step downs. Discussed with patient various exercises to complete within gym routine. Ms. Concepcion remains a candidate for skilled PT.  -ANDREW        PT Plan    PT Plan Comments consider transverse step up/lunge  -ANDREW            User Key  (r) = Recorded By, (t) = Taken By, (c) = Cosigned By    Initials Name Provider Type    Fernanda Gilliam, PT Physical Therapist                   OP Exercises     Row Name 11/16/22 1500             Subjective Comments    Subjective Comments I am stiff today  -ANDREW         Total Minutes    93979 - PT Therapeutic Exercise Minutes 45  -ANDREW         Exercise 2    Exercise Name 2 rec bike  -ANDREW      Cueing 2 Verbal;Demo  -ANDREW      Time 2 5 min lvl 5  -ANDREW         Exercise 3    Exercise Name 3 standing gastroc stretch  -ANDREW      Cueing 3 Verbal;Demo  -ANDREW      Reps 3 3  -ANDREW      Time 3 20s  -ANDREW         Exercise 4    Exercise Name 4 standing knee flexion stretch  -ANDREW      Cueing 4 Verbal;Demo  -ANDREW      Sets 4 1  -ANDREW      Reps 4 10  -ANDREW      Time 4 10s  -ANDREW      Additional Comments at step  -ANDREW         Exercise 5    Exercise Name 5 standing HS stretch  -ANDREW      Cueing 5 Verbal;Demo  -ANDREW      Reps 5 3  -ANDREW      Time 5 20s  -ANDREW      Additional Comments at step  -ANDREW         Exercise 6    Exercise Name 6 quad stretch  -ANDREW      Cueing 6 Verbal;Demo  -ANDREW      Reps 6 3  -ANDREW      Time 6 20sec  -ANDREW      Additional Comments prone with green strap (pt had cramps with standing quad stretch)  -ANDREW         Exercise 12    Exercise Name 12 lateral stepping  -ANDREW      Cueing 12 Verbal;Demo  -ANDREW      Reps 12 3 laps  -ANDREW      Time 12 GTB  -ANDREW         Exercise 13    Exercise Name 13 step up + knee   -ANDREW      Cueing 13 Verbal;Demo  -ANDREW      Sets 13 1  -ANDREW      Reps 13 15  -ANDREW      Time 13 fwd/lat  -ANDREW      Additional Comments bosu  -ANDREW         Exercise 14    Exercise Name 14 RDL  -ANDREW      Cueing 14 Verbal;Demo  -ANDREW      Sets 14 1  -ANDREW      Reps 14 15  -ANDREW      Time 14 Dowel with 7#s strapped around  -ANDREW      Additional Comments cuing to avoid knee valgus  -ANDREW         Exercise 15    Exercise Name 15 leg press, RLE  -ANDREW      Cueing 15 Verbal;Demo  -ANDREW      Sets 15 2  -ANDREW      Reps 15 15  -ANDREW      Time 15 Seat 11; 150#  -ANDREW       Additional Comments cuing to avoid knee valgus  -ANDREW         Exercise 17    Exercise Name 17 monster walk, F/B  -ANDREW      Cueing 17 Verbal;Demo  -ANDREW      Reps 17 3 laps  -ANDREW      Time 17 GTB  -ANDREW         Exercise 18    Exercise Name 18 quad burner - SLS on airex  -ANDREW      Cueing 18 Verbal;Demo  -ANDREW      Sets 18 3  -ANDREW      Reps 18 30sec  -ANDREW         Exercise 19    Exercise Name 19 fire hydrant  -ANDREW      Cueing 19 Verbal;Demo  -ANDREW      Sets 19 2  -ANDREW      Reps 19 15  -ANDREW      Time 19 GTB  -ANDREW         Exercise 20    Exercise Name 20 step downs  -ANDREW      Cueing 20 Verbal;Demo  -ANDREW      Sets 20 1  -ANDREW      Reps 20 15  -ANDREW      Time 20 4in  -ANDREW      Additional Comments RTB pulling patient into valgus to inc hip abd  -ANDREW            User Key  (r) = Recorded By, (t) = Taken By, (c) = Cosigned By    Initials Name Provider Type    Fernanda Gilliam, PT Physical Therapist                              PT OP Goals     Row Name 11/16/22 1500          PT Short Term Goals    STG Date to Achieve 11/04/22  -     STG 1 Pt will be independent with initial HEP to improve strength/ROM and decrease pain.  -     STG 1 Progress Met  -     STG 2 Pt will demonstrate improved R knee flexion AROM from 100 deg to 110 deg or better.  -     STG 2 Progress Met  -     STG 3 Pt will be able to ascend/descend stairs in a reciprocal pattern with</= 3/10 pain.  -     STG 3 Progress Met  -        Long Term Goals    LTG Date to Achieve 12/04/22  -     LTG 1 Pt will be independent with advance HEP to improve strength/ROM and decrease pain.  -     LTG 1 Progress Progressing  -     LTG 2 Pt will demonstrate normalized gait mechanics without AD.  -     LTG 2 Progress Met  -     LTG 3 Pt will improve KOS score to 75% to show improved quality of life.  -     LTG 3 Progress Ongoing;Progressing  -     LTG 4 Pt will improve R knee AROM from lacking 4-92 to at least 0-120 deg to improve ability to navigate stairs.  -     LTG  4 Progress Ongoing;Progressing  -ANDREW     LTG 5 Pt will improve B LE strength to at least 5/5.  -ANDREW     LTG 5 Progress Progressing  -ANDREW           User Key  (r) = Recorded By, (t) = Taken By, (c) = Cosigned By    Initials Name Provider Type    Fernanda Gilliam, PT Physical Therapist                Therapy Education  Given: HEP, Symptoms/condition management  Program: Reinforced  How Provided: Verbal  Provided to: Patient  Level of Understanding: Verbalized              Time Calculation:   Start Time: 1530  Stop Time: 1615  Time Calculation (min): 45 min  Timed Charges  44354 - PT Therapeutic Exercise Minutes: 45  Total Minutes  Timed Charges Total Minutes: 45   Total Minutes: 45  Therapy Charges for Today     Code Description Service Date Service Provider Modifiers Qty    32517806223  PT THER PROC EA 15 MIN 11/16/2022 Fernanda Calderon, PT GP 3                    Fernanda Calderon, PT  11/16/2022

## 2022-11-17 ENCOUNTER — APPOINTMENT (OUTPATIENT)
Dept: PHYSICAL THERAPY | Facility: HOSPITAL | Age: 52
End: 2022-11-17

## 2022-11-22 ENCOUNTER — HOSPITAL ENCOUNTER (OUTPATIENT)
Dept: PHYSICAL THERAPY | Facility: HOSPITAL | Age: 52
Setting detail: THERAPIES SERIES
Discharge: HOME | End: 2022-11-22

## 2022-11-22 DIAGNOSIS — Z47.89 ORTHOPEDIC AFTERCARE: ICD-10-CM

## 2022-11-22 DIAGNOSIS — Z96.651 S/P REVISION OF TOTAL KNEE, RIGHT: Primary | ICD-10-CM

## 2022-11-22 PROCEDURE — 97110 THERAPEUTIC EXERCISES: CPT

## 2022-11-22 NOTE — THERAPY TREATMENT NOTE
Outpatient Physical Therapy Ortho Treatment Note  [facility]     Patient Name: Funmilayo Concepcion  : 1970  MRN: [id number]  Today's Date: 2022      Visit Date: 2022    Visit Dx:    ICD-10-CM ICD-9-CM   1. S/P revision of total knee, right  Z96.651 V43.65   2. Orthopedic aftercare  Z47.89 V54.9       Patient Active Problem List   Diagnosis   • S/P ACL reconstruction   • Pain and swelling of knee   • Aseptic loosening of prosthetic knee (HCC)   • Mechanical loosening of prosthetic knee, initial encounter ([facility])        Past Medical History:   Diagnosis Date   • Arthritis    • Hypertension    • Right knee pain         Past Surgical History:   Procedure Laterality Date   • ACHILLES TENDON REPAIR Left     X2   • FOOT SURGERY Right     HEEL SPURS   • HYSTERECTOMY     • JOINT REPLACEMENT Right     KNEE ARTHROPLASTY   • KNEE ACL RECONSTRUCTION  2016   • TOTAL KNEE ARTHROPLASTY REVISION Right 2022    Procedure: TOTAL KNEE ARTHROPLASTY REVISION;  Surgeon: Kevin Green MD;  [place] [facility] [place] [facility] [place]  Service: Orthopedics;  Laterality: Right;                        PT Assessment/Plan     Row Name 22 1500          PT Assessment    Assessment Comments Ms. Concepcion arrives to PT reporting increased pain following last session that lasted approximately 3 days that has not full resolved. Due to lingering pain, various modifications made to reduce exercise intensity/resistance to improve patient tolerance. Added SLS with ball toss to rebound while on foam. Discussed pain relieving technique in attempt to reduce residual pain/discomfort following last session. Ms. Concepcion remains a candidate for skilled PT.  -ANDREW        PT Plan    PT Plan Comments resume previous program, consider transverse step up/lunge  -ANDREW           User Key  (r) = Recorded By, (t) = Taken By, (c) = Cosigned By    Initials Name Provider Type    Fernanda Gilliam, PT Physical Therapist                   OP Exercises     Row  Name 11/22/22 1500             Subjective Comments    Subjective Comments I was very sore after last time for a few days. I also need to leave a little early today  -ANDREW         Subjective Pain    Able to rate subjective pain? yes  -ANDREW      Pre-Treatment Pain Level 5  -ANDREW         Total Minutes    90732 - PT Therapeutic Exercise Minutes 38  -ANDREW         Exercise 2    Exercise Name 2 rec bike  -ANDREW      Cueing 2 Verbal;Demo  -ANDREW      Time 2 5 min lvl 5  -ANDREW         Exercise 4    Exercise Name 4 standing knee flexion stretch  -ANDREW      Cueing 4 Verbal;Demo  -ANDREW      Reps 4 3  -ANDREW      Time 4 20s  -ANDREW      Additional Comments at step  -ANDREW         Exercise 5    Exercise Name 5 standing HS stretch  -ANDREW      Cueing 5 Verbal;Demo  -ANDREW      Reps 5 3  -ANDREW      Time 5 20s  -ANDREW         Exercise 10    Exercise Name 10 SLS ball toss to rebounder  -ANDREW      Cueing 10 Verbal;Demo  -ANDREW      Sets 10 2  -ANDREW      Reps 10 15  -ANDREW      Time 10 on foam  -ANDREW      Additional Comments L2  -ANDREW         Exercise 12    Exercise Name 12 lateral stepping  -ANDREW      Cueing 12 Verbal;Demo  -ANDREW      Reps 12 3 laps  -ANDREW      Time 12 GTB  -ANDREW         Exercise 13    Exercise Name 13 step up + knee  with OH press (L2)  -ANDREW      Cueing 13 Verbal;Demo  -ANDREW      Sets 13 1  -ANDREW      Reps 13 15  -ANDREW      Time 13 fwd/lat  -ANDREW      Additional Comments 6in step with foam  -ANDREW         Exercise 14    Exercise Name 14 Good mornings (seated RDL)  -ANDREW      Cueing 14 Verbal;Demo  -ANDREW      Sets 14 2  -ANDREW      Reps 14 10  -ANDREW      Time 14 Dowel with 7#s strapped around  -ANDREW         Exercise 15    Exercise Name 15 leg press, RLE  -ANDREW      Cueing 15 Verbal;Demo  -ANDREW      Sets 15 2  -ANDREW      Reps 15 10  -ANDREW      Time 15 Seat 11; 140#  -ANDREW      Additional Comments cuing to avoid knee valgus  -ANDREW         Exercise 17    Exercise Name 17 monster walk, F/B  -ANDREW      Cueing 17 Verbal;Demo  -ANDREW      Reps 17 3 laps  -ANDREW      Time 17 GTB  -ANDREW         Exercise 18    Exercise Name 18  quad burner - SLS on airex  -ANDREW      Cueing 18 Verbal;Demo  -ANDREW      Sets 18 3  -ANDREW      Reps 18 20sec  -ANDREW      Time 18 dec in speed  -ANDREW         Exercise 19    Exercise Name 19 fire hydrant  -ANDREW      Cueing 19 Verbal;Demo  -ANDREW      Sets 19 2  -ANDREW      Reps 19 15  -ANDREW      Time 19 GTB  -ANDREW      Additional Comments cues for form  -ANDREW         Exercise 20    Exercise Name 20 step downs  -ANDREW      Cueing 20 Verbal;Demo  -ANDREW      Sets 20 1  -ANDREW      Reps 20 15  -ANDREW      Time 20 4in  -ANDREW      Additional Comments RTB pulling patient into valgus to inc hip abd  -ANDREW            User Key  (r) = Recorded By, (t) = Taken By, (c) = Cosigned By    Initials Name Provider Type    Fernanda Gilliam, PT Physical Therapist                              PT OP Goals     Row Name 11/22/22 1500          PT Short Term Goals    STG Date to Achieve 11/04/22  -     STG 1 Pt will be independent with initial HEP to improve strength/ROM and decrease pain.  -     STG 1 Progress Met  -     STG 2 Pt will demonstrate improved R knee flexion AROM from 100 deg to 110 deg or better.  -     STG 2 Progress Met  -     STG 3 Pt will be able to ascend/descend stairs in a reciprocal pattern with</= 3/10 pain.  -     STG 3 Progress Met  -        Long Term Goals    LTG Date to Achieve 12/04/22  -     LTG 1 Pt will be independent with advance HEP to improve strength/ROM and decrease pain.  -     LTG 1 Progress Progressing  -     LTG 2 Pt will demonstrate normalized gait mechanics without AD.  -     LTG 2 Progress Met  -     LTG 3 Pt will improve KOS score to 75% to show improved quality of life.  -     LTG 3 Progress Ongoing;Progressing  -     LTG 4 Pt will improve R knee AROM from lacking 4-92 to at least 0-120 deg to improve ability to navigate stairs.  -     LTG 4 Progress Ongoing;Progressing  -     LTG 5 Pt will improve B LE strength to at least 5/5.  -     LTG 5 Progress Progressing  -           User Key  (r) =  Recorded By, (t) = Taken By, (c) = Cosigned By    Initials Name Provider Type    Fernanda Gilliam, PT Physical Therapist                Therapy Education  Education Details: pain relieving techniques and HEP modifications  Given: HEP, Symptoms/condition management, Pain management  Program: Reinforced, Modified  How Provided: Verbal, Demonstration  Provided to: Patient  Level of Understanding: Verbalized, Demonstrated              Time Calculation:   Start Time: 1525  Stop Time: 1603  Time Calculation (min): 38 min  Timed Charges  48345 - PT Therapeutic Exercise Minutes: 38  Total Minutes  Timed Charges Total Minutes: 38   Total Minutes: 38  Therapy Charges for Today     Code Description Service Date Service Provider Modifiers Qty    28959910733 HC PT THER PROC EA 15 MIN 11/22/2022 Fernanda Calderon, PT GP 3                    Fernanda Calderon, PT  11/22/2022

## 2022-11-25 ENCOUNTER — APPOINTMENT (OUTPATIENT)
Dept: PHYSICAL THERAPY | Facility: HOSPITAL | Age: 52
End: 2022-11-25

## 2022-12-06 ENCOUNTER — HOSPITAL ENCOUNTER (OUTPATIENT)
Dept: PHYSICAL THERAPY | Facility: HOSPITAL | Age: 52
Setting detail: THERAPIES SERIES
Discharge: HOME | End: 2022-12-06

## 2022-12-06 DIAGNOSIS — Z96.651 S/P REVISION OF TOTAL KNEE, RIGHT: Primary | ICD-10-CM

## 2022-12-06 DIAGNOSIS — M25.561 ACUTE PAIN OF RIGHT KNEE: ICD-10-CM

## 2022-12-06 DIAGNOSIS — Z96.651 S/P TKR (TOTAL KNEE REPLACEMENT), RIGHT: ICD-10-CM

## 2022-12-06 DIAGNOSIS — Z47.89 ORTHOPEDIC AFTERCARE: ICD-10-CM

## 2022-12-06 PROCEDURE — 97110 THERAPEUTIC EXERCISES: CPT

## 2022-12-06 NOTE — THERAPY PROGRESS REPORT/RE-CERT
Outpatient Physical Therapy Ortho Progress Note  [facility]     Patient Name: Funmilayo Concepcion  : 1970  MRN: [id number]  Today's Date: 2022      Visit Date: 2022    Visit Dx:    ICD-10-CM ICD-9-CM   1. S/P revision of total knee, right  Z96.651 V43.65   2. Orthopedic aftercare  Z47.89 V54.9   3. S/P TKR (total knee replacement), right  Z96.651 V43.65   4. Acute pain of right knee  M25.561 719.46       Patient Active Problem List   Diagnosis   • S/P ACL reconstruction   • Pain and swelling of knee   • Aseptic loosening of prosthetic knee ([facility]ly [facility])   • Mechanical loosening of prosthetic knee, initial encounter ([facility])        Past Medical History:   Diagnosis Date   • Arthritis    • Hypertension    • Right knee pain         Past Surgical History:   Procedure Laterality Date   • ACHILLES TENDON REPAIR Left     X2   • FOOT SURGERY Right     HEEL SPURS   • HYSTERECTOMY     • JOINT REPLACEMENT Right     KNEE ARTHROPLASTY   • KNEE ACL RECONSTRUCTION  2016   • TOTAL KNEE ARTHROPLASTY REVISION Right 2022    Procedure: TOTAL KNEE ARTHROPLASTY REVISION;  Surgeon: Kevin Green MD;  Location: [facility] OR [facility] [place];  Service: Orthopedics;  Laterality: Right;        PT Ortho     Row Name 22 1600       Right Lower Ext    Rt Knee Extension/Flexion AROM 0-120 no quad lag noted w/ SLR  -       MMT Right Lower Ext    Rt Knee Extension MMT, Gross Movement (5/5) normal  -    Rt Knee Flexion MMT, Gross Movement (4+/5) good plus  in prone  -          User Key  (r) = Recorded By, (t) = Taken By, (c) = Cosigned By    Initials Name Provider Type     Funmilayo Yang, PT Physical Therapist                             PT Assessment/Plan     Row Name 22 1600          PT Assessment    Functional Limitations Decreased safety during functional activities;Impaired gait;Limitations in community activities;Limitations in functional capacity and performance;Performance in leisure activities;Performance in sport  activities;Performance in work activities  -     Impairments Balance;Endurance;Gait;Impaired flexibility;Joint mobility;Locomotion;Muscle strength;Pain;Poor body mechanics;Range of motion  -     Assessment Comments Funmilayo Concepcion has been seen for 14 physical therapy sessions s/p Right TKA revision on 9/8/22.. Treatment has included therapeutic exercise, manual therapy, neuro-muscular retraining  and patient education with home exercise program . Progress to physical therapy goals is good as pt. Has met 3/3 STGs, and 3/6 LTGs with 1 new goal established. She has returned to work which has resulted in expected stiffness throughout the day as she is required to sit for work, she continues with pain/discomfort at medial aspect of knee despite efforts to reduce pain with stretches, manual interventions, and strengthening. Aside from medial knee pain she has tolerated progression toward high levels challenges, demonstrates R knee AROM WNL 0-120 without quad lag noted and near normal gait pattern. She has not yet returned to softball but hopeful to be ready to play come [employer]. She will benefit from continued skilled physical therapy to address remaining impairments and functional limitations.  -     Please refer to paper survey for additional self-reported information Yes  -MH     Rehab Potential Good  -     Patient/caregiver participated in establishment of treatment plan and goals Yes  -     Patient would benefit from skilled therapy intervention Yes  -        PT Plan    PT Frequency 1x/week;2x/week  -     Predicted Duration of Therapy Intervention (PT) 4 visits  -     Planned CPT's? PT RE-EVAL: 22545;PT THER PROC EA 15 MIN: 63319;PT THER ACT EA 15 MIN: 01579;PT MANUAL THERAPY EA 15 MIN: 58744;PT NEUROMUSC RE-EDUCATION EA 15 MIN: 79956;PT GAIT TRAINING EA 15 MIN: 42694;PT SELF CARE/HOME MGMT/TRAIN EA 15: 33809;PT HOT OR COLD PACK TREAT MCARE  -     PT Plan Comments consider light jogging, power  "skip, side shuffle? reinforce importance of HEP compliance with program to allow for progression to sport realted activities in clinic  -           User Key  (r) = Recorded By, (t) = Taken By, (c) = Cosigned By    Initials Name Provider Type     Funmilayo Yang, PT Physical Therapist                   OP Exercises     Row Name 12/06/22 1600             Subjective Comments    Subjective Comments The inside part of my knee hurts, I am sick of it. I dont know if its ever going to heal  -         Total Minutes    49391 - PT Therapeutic Exercise Minutes 40  -MH         Exercise 2    Exercise Name 2 rec bike  -MH      Cueing 2 Verbal;Demo  -MH      Time 2 5 min lvl 5  -MH         Exercise 4    Exercise Name 4 standing knee flexion stretch  -      Cueing 4 Verbal;Demo  -MH      Reps 4 3  -MH      Time 4 20s  -      Additional Comments at step  -         Exercise 5    Exercise Name 5 standing HS stretch  -MH      Cueing 5 Verbal;Demo  -MH      Reps 5 3  -MH      Time 5 20s  -MH         Exercise 9    Exercise Name 9 side shuffle on treadmill  -      Cueing 9 Verbal;Demo  -MH      Reps 9 2 min (1 min each)  -MH      Time 9 --->1.7 mph  -MH         Exercise 10    Exercise Name 10 SLS ball toss to rebounder  -      Cueing 10 Verbal;Demo  -      Reps 10 15  -MH      Time 10 L2  -MH         Exercise 11    Exercise Name 11 lateral ball toss tomimic swing  -      Cueing 11 Verbal;Demo  -MH      Reps 11 10ea  -MH      Time 11 L2 to rebounder  -MH         Exercise 12    Exercise Name 12 lateral stepping  -      Cueing 12 Verbal;Demo  -      Reps 12 3 laps  -MH      Time 12 GTB  -         Exercise 13    Exercise Name 13 step up + knee  with OH press (L2)  -MH      Cueing 13 Verbal;Demo  -      Sets 13 1  -MH      Reps 13 15  -MH      Time 13 fwd/lat  -      Additional Comments 6\" step with foam  -MH         Exercise 14    Exercise Name 14 Good mornings (seated RDL)  -      Cueing 14 Verbal;Demo  " -      Sets 14 2  -      Reps 14 10  -      Time 14 Dowel with 7#s strapped around  -         Exercise 15    Exercise Name 15 leg press, RLE  -      Cueing 15 Verbal;Demo  -      Sets 15 2  -      Reps 15 10  -      Time 15 Seat 11; 140#  -      Additional Comments cues to avoid valgus collapse  -         Exercise 17    Exercise Name 17 monster walk, F/B  -      Cueing 17 Verbal;Demo  -      Reps 17 3 laps  -      Time 17 GTB  -            User Key  (r) = Recorded By, (t) = Taken By, (c) = Cosigned By    Initials Name Provider Type     Funmilayo Yang, PT Physical Therapist                              PT OP Goals     Row Name 12/06/22 1600          PT Short Term Goals    STG Date to Achieve 11/04/22  -     STG 1 Pt will be independent with initial HEP to improve strength/ROM and decrease pain.  -     STG 1 Progress Met  -     STG 2 Pt will demonstrate improved R knee flexion AROM from 100 deg to 110 deg or better.  -     STG 2 Progress Met  [facility]     STG 3 Pt will be able to ascend/descend stairs in a reciprocal pattern with</= 3/10 pain.  -     STG 3 Progress Met  [facility]        Long Term Goals    LTG Date to Achieve 12/04/22  -     LTG 1 Pt will be independent with advance HEP to improve strength/ROM and decrease pain.  -     LTG 1 Progress Progressing  -     LTG 1 Progress Comments updating as able  -     LTG 2 Pt will demonstrate normalized gait mechanics without AD.  -     LTG 2 Progress Met  [facility]     LTG 3 Pt will improve KOS score to 75% to show improved quality of life.  -     LTG 3 Progress Met  [facility]     LTG 3 Progress Comments 75.71  -     LTG 4 Pt will improve R knee AROM from lacking 4-92 to at least 0-120 deg to improve ability to navigate stairs.  -     LTG 4 Progress Met  -     LTG 4 Progress Comments see ortho  -     LTG 5 Pt will improve B LE strength to at least 5/5.  -     LTG 5 Progress Progressing  -     LTG 5 Progress Comments see ortho  4+/5 knee flexion measured in prone  -     LTG 6 Pt. will tolerate light jogging, multi-directional agility movements without exacerbation of knee pain to allow for progression to return to sport.  -     LTG 6 Progress New  -           User Key  (r) = Recorded By, (t) = Taken By, (c) = Cosigned By    Initials Name Provider Type     Funmilayo Yang, PT Physical Therapist                Therapy Education  Given: Symptoms/condition management, Posture/body mechanics  Program: Reinforced  How Provided: Verbal, Demonstration  Provided to: Patient  Level of Understanding: Verbalized, Demonstrated    Outcome Measure Options: Knee Outcome Score- ADL  Knee Outcome Survey Activities of Daily Living Scale  Symptoms: Pain: The symptom affects my activity slightly  Symptoms: Stiffness: The symptom affects my activity slightly  Symptoms: Swelling: I have the symptom, but it does not affect my activity  Symptoms: Giving way, buckling, or shifting of the knee: I do not have the symptom  Symptoms: Weakness: The symptom affects my activity slightly  Symptoms: Limping: The symptom affects my activity slightly  Functional Limitations with ADL's: Walk: Activity is minimally difficult  Functional Limitations with ADL's: Go up stairs: Activity is minimally difficult  Functional Limitations with ADL's: Go down stairs: Activity is minimally difficult  Functional Limitations with ADL's: Stand: Activity is minimally difficult  Functional Limitations with ADL's: Kneel on front of your knee: Activity is somewhat difficult  Functional Limitations with ADL's: Squat: Activity is somewhat difficult  Functional Limitations with ADL's: Sit with your knee bent: Activity is not difficult  Functional Limitations with ADL's: Rise from a chair: Activity is not difficult  Knee Outcome Summary ADL's Score: 75.71 %      Time Calculation:   Start Time: 1559  Stop Time: 1639  Time Calculation (min): 40 min  Total Timed Code Minutes- PT: 40  minute(s)  Timed Charges  34510 - PT Therapeutic Exercise Minutes: 40  Total Minutes  Timed Charges Total Minutes: 40   Total Minutes: 40  Therapy Charges for Today     Code Description Service Date Service Provider Modifiers Qty    04637952227  PT THER PROC EA 15 MIN 12/6/2022 Funmilayo Yang, PT GP 3          PT G-Codes  Outcome Measure Options: Knee Outcome Score- ADL         Funmilayo Yang PT  12/6/2022

## 2022-12-08 ENCOUNTER — TRANSCRIBE ORDERS (OUTPATIENT)
Dept: PHYSICAL THERAPY | Facility: HOSPITAL | Age: 52
End: 2022-12-08

## 2022-12-08 DIAGNOSIS — Z96.651 S/P TKR (TOTAL KNEE REPLACEMENT), RIGHT: Primary | ICD-10-CM

## 2022-12-09 ENCOUNTER — HOSPITAL ENCOUNTER (OUTPATIENT)
Dept: PHYSICAL THERAPY | Facility: HOSPITAL | Age: 52
Setting detail: THERAPIES SERIES
Discharge: HOME | End: 2022-12-09

## 2022-12-09 DIAGNOSIS — Z47.89 ORTHOPEDIC AFTERCARE: ICD-10-CM

## 2022-12-09 DIAGNOSIS — Z96.651 S/P REVISION OF TOTAL KNEE, RIGHT: Primary | ICD-10-CM

## 2022-12-09 PROCEDURE — 97110 THERAPEUTIC EXERCISES: CPT

## 2022-12-09 NOTE — THERAPY TREATMENT NOTE
Outpatient Physical Therapy Ortho Treatment Note  [facility]     Patient Name: Funmilayo Concepcion  : 1970  MRN: [id number]  Today's Date: 2022      Visit Date: 2022    Visit Dx:    ICD-10-CM ICD-9-CM   1. S/P revision of total knee, right  Z96.651 V43.65   2. Orthopedic aftercare  Z47.89 V54.9       Patient Active Problem List   Diagnosis   • S/P ACL reconstruction   • Pain and swelling of knee   • Aseptic loosening of prosthetic knee (HCC)   • Mechanical loosening of prosthetic knee, initial encounter ([facility])        Past Medical History:   Diagnosis Date   • Arthritis    • Hypertension    • Right knee pain         Past Surgical History:   Procedure Laterality Date   • ACHILLES TENDON REPAIR Left     X2   • FOOT SURGERY Right     HEEL SPURS   • HYSTERECTOMY     • JOINT REPLACEMENT Right     KNEE ARTHROPLASTY   • KNEE ACL RECONSTRUCTION  2016   • TOTAL KNEE ARTHROPLASTY REVISION Right 2022    Procedure: TOTAL KNEE ARTHROPLASTY REVISION;  Surgeon: Kevin Green MD;  Location: [facility] OR [facility] [place];  Service: Orthopedics;  Laterality: Right;                        PT Assessment/Plan     Row Name 22 1600          PT Assessment    Assessment Comments She continues to report medial dull knee pain throughout day to day tasks. Continued with high level strength challenges with addition of agility interventions. Patient performed each exercise at 30% intensity. Added light jogging, lateral shuffling, power skipping and karaoke. Patient expresses fatigue throughout session and mild medial knee pain. She demonstrates difficulty with SL power and control with landing. She continues to remain a candidate for skilled PT.  -ANDREW        PT Plan    PT Plan Comments response to last session, consider squat jumps and fwd/back quick feet, lateral DL small hop  -ANDREW           User Key  (r) = Recorded By, (t) = Taken By, (c) = Cosigned By    Initials Name Provider Type    Fernanda Gilliam, PT Physical  Therapist                   OP Exercises     Row Name 12/09/22 1500             Subjective Comments    Subjective Comments I am feeling good  -ANDREW         Total Minutes    80328 - PT Therapeutic Exercise Minutes 43  -ANDREW         Exercise 2    Exercise Name 2 rec bike  -ANDREW      Cueing 2 Verbal;Demo  -ANDREW      Time 2 5 min lvl 5  -ANDREW         Exercise 4    Exercise Name 4 standing knee flexion stretch  -ANDREW      Cueing 4 Verbal;Demo  -ANDREW      Reps 4 3  -ANDREW      Time 4 20s  -ANDREW         Exercise 5    Exercise Name 5 standing HS stretch  -ANDREW      Cueing 5 Verbal;Demo  -ANDREW      Reps 5 3  -ANDREW      Time 5 20s  -ANDREW         Exercise 6    Exercise Name 6 karaoke  -ANDREW      Cueing 6 Verbal;Demo  -ANDREW      Reps 6 3 laps  -ANDREW         Exercise 7    Exercise Name 7 light jogging  -ANDREW      Cueing 7 Verbal;Demo  -ANDREW      Reps 7 3 laps (back hallway)  -ANDREW      Time 7 30% intensity  -ANDREW         Exercise 8    Exercise Name 8 lateral shuffle  -ANDREW      Cueing 8 Verbal;Demo  -ANDREW      Reps 8 2 laps  -ANDREW         Exercise 10    Exercise Name 10 SLS ball toss to rebounder  -ANDREW      Cueing 10 Verbal;Demo  -ANDREW      Reps 10 15  -ANDREW      Time 10 L2  -ANDREW         Exercise 11    Exercise Name 11 lateral ball toss tomimic swing  -ANDREW      Cueing 11 Verbal;Demo  -ANDREW      Reps 11 15ea  -ANDREW      Time 11 L2 to rebounder  -ANDREW         Exercise 12    Exercise Name 12 lateral stepping  -ANDREW      Cueing 12 Verbal;Demo  -ANDREW      Reps 12 3 laps  -ANDREW      Time 12 GTB  -ANDREW         Exercise 13    Exercise Name 13 step up + knee  with OH press (L2)  -ANDREW      Cueing 13 Verbal;Demo  -ANDREW      Sets 13 1  -ANDREW      Reps 13 15  -ANDREW      Time 13 fwd/lat  -ANDREW         Exercise 14    Exercise Name 14 RDL  -ANDREW      Cueing 14 Verbal;Demo  -ANDREW      Sets 14 2  -ANDREW      Reps 14 10  -ANDREW      Time 14 Dowel with 7#s strapped around  -ANDREW         Exercise 16    Exercise Name 16 power skipping  -ANDREW      Cueing 16 Verbal;Demo  -ANDREW      Reps 16 3 laps  -ANDREW      Time 16 back smith  -ANDREW          Exercise 17    Exercise Name 17 monster walk, F/B  -ANDREW      Cueing 17 Verbal;Demo  -ANDREW      Reps 17 3 laps  -ANDREW      Time 17 GTB  -ANDREW            User Key  (r) = Recorded By, (t) = Taken By, (c) = Cosigned By    Initials Name Provider Type    Fernanda Gilliam, SHASHANK Physical Therapist                              PT OP Goals     Row Name 12/09/22 1500          PT Short Term Goals    STG Date to Achieve 11/04/22  -ANDREW     STG 1 Pt will be independent with initial HEP to improve strength/ROM and decrease pain.  -ANDREW     STG 1 Progress Met  -ANDREW     STG 2 Pt will demonstrate improved R knee flexion AROM from 100 deg to 110 deg or better.  -ANDREW     STG 2 Progress Met  -ANDREW     STG 3 Pt will be able to ascend/descend stairs in a reciprocal pattern with</= 3/10 pain.  -ANDREW     STG 3 Progress Met  -ANDREW        Long Term Goals    LTG Date to Achieve 12/04/22  -ANDREW     LTG 1 Pt will be independent with advance HEP to improve strength/ROM and decrease pain.  -ANDREW     LTG 1 Progress Progressing  -ANDREW     LTG 2 Pt will demonstrate normalized gait mechanics without AD.  -ANDREW     LTG 2 Progress Met  -ANDREW     LTG 3 Pt will improve KOS score to 75% to show improved quality of life.  -ANDREW     LTG 3 Progress Met  -ANDREW     LTG 4 Pt will improve R knee AROM from lacking 4-92 to at least 0-120 deg to improve ability to navigate stairs.  -ANDREW     LTG 4 Progress Met  -ANDREW     LTG 5 Pt will improve B LE strength to at least 5/5.  -ANDREW     LTG 5 Progress Progressing  -ANDREW     LTG 6 Pt. will tolerate light jogging, multi-directional agility movements without exacerbation of knee pain to allow for progression to return to sport.  -ANDREW     LTG 6 Progress New  -ANDREW           User Key  (r) = Recorded By, (t) = Taken By, (c) = Cosigned By    Initials Name Provider Type    Fernanda Gilliam PT Physical Therapist                Therapy Education  Given: Symptoms/condition management, Posture/body mechanics  Program: Reinforced  How Provided: Verbal,  Demonstration  Provided to: Patient  Level of Understanding: Verbalized, Demonstrated              Time Calculation:   Start Time: 1532  Stop Time: 1615  Time Calculation (min): 43 min  Timed Charges  34918 - PT Therapeutic Exercise Minutes: 43  Total Minutes  Timed Charges Total Minutes: 43   Total Minutes: 43  Therapy Charges for Today     Code Description Service Date Service Provider Modifiers Qty    72462907164 HC PT THER PROC EA 15 MIN 12/9/2022 Fernanda Calderon, PT GP 3                    Fernanda Calderon, PT  12/9/2022

## 2022-12-15 ENCOUNTER — HOSPITAL ENCOUNTER (OUTPATIENT)
Dept: PHYSICAL THERAPY | Facility: HOSPITAL | Age: 52
Setting detail: THERAPIES SERIES
Discharge: HOME | End: 2022-12-15

## 2022-12-15 DIAGNOSIS — Z96.651 S/P REVISION OF TOTAL KNEE, RIGHT: Primary | ICD-10-CM

## 2022-12-15 DIAGNOSIS — Z47.89 ORTHOPEDIC AFTERCARE: ICD-10-CM

## 2022-12-15 PROCEDURE — 97110 THERAPEUTIC EXERCISES: CPT

## 2022-12-15 NOTE — THERAPY TREATMENT NOTE
Outpatient Physical Therapy Ortho Treatment Note  [facility]     Patient Name: Funmilayo Concepcion  : 1970  MRN: [id number]  Today's Date: 12/15/2022      Visit Date: 12/15/2022    Visit Dx:    ICD-10-CM ICD-9-CM   1. S/P revision of total knee, right  Z96.651 V43.65   2. Orthopedic aftercare  Z47.89 V54.9       Patient Active Problem List   Diagnosis   • S/P ACL reconstruction   • Pain and swelling of knee   • Aseptic loosening of prosthetic knee (HCC)   • Mechanical loosening of prosthetic knee, initial encounter ([facility])        Past Medical History:   Diagnosis Date   • Arthritis    • Hypertension    • Right knee pain         Past Surgical History:   Procedure Laterality Date   • ACHILLES TENDON REPAIR Left     X2   • FOOT SURGERY Right     HEEL SPURS   • HYSTERECTOMY     • JOINT REPLACEMENT Right     KNEE ARTHROPLASTY   • KNEE ACL RECONSTRUCTION  2016   • TOTAL KNEE ARTHROPLASTY REVISION Right 2022    Procedure: TOTAL KNEE ARTHROPLASTY REVISION;  Surgeon: Kevin Green MD;  [place] [facility] [place] [facility] [place]  Service: Orthopedics;  Laterality: Right;                        PT Assessment/Plan     Row Name 12/15/22 1600          PT Assessment    Assessment Comments Pt arrives today with inc'd pain d/t inc'd standing last night. Continues to have dull achy pain, but tolerates exercises well. With performing light jog today, pt demo's altered gait mechanics that she was able to moderately correct with verbal cueing. Added jump squat today with cues for soft landing. Pt tolerates well. Fatigued by the end of the session. Continues to benefit from skilled PT.  -DB        PT Plan    PT Plan Comments consider fwd/back quick feet, lateral DL small hop  -DB           User Key  (r) = Recorded By, (t) = Taken By, (c) = Cosigned By    Initials Name Provider Type    Nina Parker, PT Physical Therapist                   OP Exercises     Row Name 12/15/22 1500             Subjective Comments    Subjective Comments  A little sore today, was standing for 6 hours last night at [employer].  -DB         Total Minutes    22444 - PT Therapeutic Exercise Minutes 42  -DB         Exercise 2    Exercise Name 2 rec bike  -DB      Cueing 2 Verbal;Demo  -DB      Time 2 5 min lvl 5  -DB         Exercise 3    Exercise Name 3 standing gastroc stretch  -DB      Cueing 3 Verbal;Demo  -DB      Reps 3 3  -DB      Time 3 20s  -DB         Exercise 4    Exercise Name 4 standing knee flexion stretch  -DB      Cueing 4 Verbal;Demo  -DB      Reps 4 3  -DB      Time 4 20s  -DB         Exercise 5    Exercise Name 5 standing HS stretch  -DB      Cueing 5 Verbal;Demo  -DB      Reps 5 3  -DB      Time 5 20s  -DB         Exercise 6    Exercise Name 6 karaoke  -DB      Cueing 6 Verbal;Demo  -DB      Reps 6 3 laps  -DB         Exercise 7    Exercise Name 7 light jogging  -DB      Cueing 7 Verbal;Demo  -DB      Reps 7 3 laps (back hallway)  -DB      Time 7 30% intensity  -DB         Exercise 8    Exercise Name 8 lateral shuffle  -DB      Cueing 8 Verbal;Demo  -DB      Reps 8 3 laps  -DB         Exercise 10    Exercise Name 10 SLS ball toss to rebounder  -DB      Cueing 10 Verbal;Demo  -DB      Reps 10 20  -DB      Time 10 L2  -DB         Exercise 11    Exercise Name 11 lateral ball toss tomimic swing  -DB      Cueing 11 Verbal;Demo  -DB      Reps 11 20 ea  -DB      Time 11 L2 to rebounder  -DB         Exercise 12    Exercise Name 12 lateral stepping  -DB      Cueing 12 Verbal;Demo  -DB      Reps 12 3 laps  -DB      Time 12 GTB  -DB         Exercise 13    Exercise Name 13 step up + knee  with OH press (L2)  -DB      Cueing 13 Verbal;Demo  -DB      Sets 13 2  -DB      Reps 13 10  -DB      Time 13 fwd/lat  -DB         Exercise 16    Exercise Name 16 power skipping  -DB      Cueing 16 Verbal;Demo  -DB      Reps 16 3 laps  -DB      Time 16 back smith  -DB         Exercise 17    Exercise Name 17 monster walk, F/B  -DB      Cueing 17 Verbal;Demo  -DB      Reps 17  3 laps  -DB      Time 17 GTB  -DB         Exercise 18    Exercise Name 18 squat jump  -DB      Cueing 18 Verbal;Tactile;Demo  -DB      Sets 18 2  -DB      Reps 18 10  -DB      Additional Comments cues for soft landing on ball of foot  -DB            User Key  (r) = Recorded By, (t) = Taken By, (c) = Cosigned By    Initials Name Provider Type    DB Nina Toussaint, PT Physical Therapist                                                Time Calculation:   Start Time: 1530  Stop Time: 1612  Time Calculation (min): 42 min  Total Timed Code Minutes- PT: 42 minute(s)  Timed Charges  97834 - PT Therapeutic Exercise Minutes: 42  Total Minutes  Timed Charges Total Minutes: 42   Total Minutes: 42  Therapy Charges for Today     Code Description Service Date Service Provider Modifiers Qty    17306677710 HC PT THER PROC EA 15 MIN 12/15/2022 Nina Toussaint, PT GP 3                    Nina Toussaint, PT  12/15/2022

## 2022-12-21 ENCOUNTER — HOSPITAL ENCOUNTER (OUTPATIENT)
Dept: PHYSICAL THERAPY | Facility: HOSPITAL | Age: 52
Setting detail: THERAPIES SERIES
Discharge: HOME | End: 2022-12-21

## 2022-12-21 DIAGNOSIS — Z96.651 S/P REVISION OF TOTAL KNEE, RIGHT: Primary | ICD-10-CM

## 2022-12-21 DIAGNOSIS — Z47.89 ORTHOPEDIC AFTERCARE: ICD-10-CM

## 2022-12-21 PROCEDURE — 97110 THERAPEUTIC EXERCISES: CPT

## 2022-12-21 NOTE — THERAPY TREATMENT NOTE
Outpatient Physical Therapy Ortho Treatment Note  [facility]     Patient Name: Funmilayo Concepcion  : 1970  MRN: [id number]  Today's Date: 2022      Visit Date: 2022    Visit Dx:    ICD-10-CM ICD-9-CM   1. S/P revision of total knee, right  Z96.651 V43.65   2. Orthopedic aftercare  Z47.89 V54.9       Patient Active Problem List   Diagnosis   • S/P ACL reconstruction   • Pain and swelling of knee   • Aseptic loosening of prosthetic knee (HCC)   • Mechanical loosening of prosthetic knee, initial encounter ([facility])        Past Medical History:   Diagnosis Date   • Arthritis    • Hypertension    • Right knee pain         Past Surgical History:   Procedure Laterality Date   • ACHILLES TENDON REPAIR Left     X2   • FOOT SURGERY Right     HEEL SPURS   • HYSTERECTOMY     • JOINT REPLACEMENT Right     KNEE ARTHROPLASTY   • KNEE ACL RECONSTRUCTION  2016   • TOTAL KNEE ARTHROPLASTY REVISION Right 2022    Procedure: TOTAL KNEE ARTHROPLASTY REVISION;  Surgeon: Kevin Green MD;  Location: [facility] OR [facility];  Service: Orthopedics;  Laterality: Right;                        PT Assessment/Plan     Row Name 22 1600          PT Assessment    Assessment Comments Funmilayo arrives to PT expressing overall frustration with lingering medial knee pain and limited pain tolerance when standing for long periods of time. Patient reports performing strength/agility exercise training without recovery even on days she works two jobs. Reinforced importance of muscle recovery and to reduce frequency to 3x/week. Patient also demo increase R foot pronation upon landing with agility exercises due to limited medial arch support. Discussed potential benefit of medial insert and shoe wear to decrease strain through medial aspect of R knee. Ms. Concepcion remains a candidate for skilled PT.  -ANDREW        PT Plan    PT Plan Comments did patient adjust shoe wear? decrease exercise frequency? continue to focus on return to sport  -ANDREW            User Key  (r) = Recorded By, (t) = Taken By, (c) = Cosigned By    Initials Name Provider Type    Fernanda Gilliam, PT Physical Therapist                   OP Exercises     Row Name 12/21/22 1500             Subjective Comments    Subjective Comments I am getting really frustrated with still having pain along the inside of my knee  -ANDREW         Total Minutes    87353 - PT Therapeutic Exercise Minutes 38  -ANDREW         Exercise 2    Exercise Name 2 elliptical  -ANDREW      Time 2 5 mins  -ANDREW         Exercise 3    Exercise Name 3 standing gastroc stretch  -ANDREW      Cueing 3 Verbal;Demo  -ANDREW      Reps 3 3  -ANDREW      Time 3 20s  -ANDREW         Exercise 4    Exercise Name 4 standing knee flexion stretch  -ANDREW      Cueing 4 Verbal;Demo  -ANDREW      Reps 4 3  -ANDREW      Time 4 20s  -ANDREW         Exercise 5    Exercise Name 5 standing HS stretch  -ANDREW      Cueing 5 Verbal;Demo  -ANDREW      Reps 5 3  -ANDREW      Time 5 20s  -ANDREW         Exercise 6    Exercise Name 6 karaoke  -ANDREW      Cueing 6 Verbal;Demo  -ANDREW      Reps 6 2 laps  -ANDREW         Exercise 7    Exercise Name 7 light jogging  -ANDREW      Cueing 7 Verbal;Demo  -ANDREW      Reps 7 2 laps (back hallway)  -ANDREW      Time 7 30% intensity  -ANDREW         Exercise 8    Exercise Name 8 lateral shuffle  -ANDREW      Cueing 8 Verbal;Demo  -ANDREW      Reps 8 2 laps  -ANDREW         Exercise 9    Exercise Name 9 fwd/lat DL to SL mini hops  -ANDREW      Cueing 9 Verbal;Demo  -ANDREW      Sets 9 2  -ANDREW      Reps 9 10  -ANDREW         Exercise 16    Exercise Name 16 power skipping  -ANDREW      Cueing 16 Verbal;Demo  -ANDREW      Reps 16 2 laps  -ANDREW      Time 16 back smith  -ANDREW         Exercise 18    Exercise Name 18 squat jump  -ANDREW      Cueing 18 Verbal;Tactile;Demo  -ANDREW      Sets 18 2  -ANDREW      Reps 18 8  -ANDREW      Additional Comments cues for soft landing on ball of foot  -ANDREW         Exercise 19    Exercise Name 19 squat to HR  -ANDREW      Cueing 19 Verbal;Demo  -ANDREW      Sets 19 2  -ANDREW      Reps 19 10  -ANDREW         Exercise 20     Exercise Name 20 fwd/bwd quick feet  -ANDREW      Cueing 20 Verbal;Demo  -ANDREW      Reps 20 3  -ANDREW      Time 20 15s  -ANDREW            User Key  (r) = Recorded By, (t) = Taken By, (c) = Cosigned By    Initials Name Provider Type    Fernanda Gilliam PT Physical Therapist                              PT OP Goals     Row Name 12/21/22 1500          PT Short Term Goals    STG Date to Achieve 11/04/22  -ANDREW     STG 1 Pt will be independent with initial HEP to improve strength/ROM and decrease pain.  -ANDREW     STG 1 Progress Met  -ANDREW     STG 2 Pt will demonstrate improved R knee flexion AROM from 100 deg to 110 deg or better.  -ANDREW     STG 2 Progress Met  -ANDREW     STG 3 Pt will be able to ascend/descend stairs in a reciprocal pattern with</= 3/10 pain.  -ANDREW     STG 3 Progress Met  -ANDREW        Long Term Goals    LTG Date to Achieve 12/04/22  -ANDREW     LTG 1 Pt will be independent with advance HEP to improve strength/ROM and decrease pain.  -ANDREW     LTG 1 Progress Progressing  -ANDREW     LTG 2 Pt will demonstrate normalized gait mechanics without AD.  -ANDREW     LTG 2 Progress Met  -ANDREW     LTG 3 Pt will improve KOS score to 75% to show improved quality of life.  -ANDREW     LTG 3 Progress Met  -ANDREW     LTG 4 Pt will improve R knee AROM from lacking 4-92 to at least 0-120 deg to improve ability to navigate stairs.  -ANDREW     LTG 4 Progress Met  -ANDREW     LTG 5 Pt will improve B LE strength to at least 5/5.  -     LTG 5 Progress Progressing  -ANDREW     LTG 6 Pt. will tolerate light jogging, multi-directional agility movements without exacerbation of knee pain to allow for progression to return to sport.  -     LTG 6 Progress New  -ANDREW           User Key  (r) = Recorded By, (t) = Taken By, (c) = Cosigned By    Initials Name Provider Type    Fernanda Gilliam PT Physical Therapist                Therapy Education  Education Details: proper shoe wear with medial arch support to decrease strain through medial knee joint  Given:  Symptoms/condition management, Posture/body mechanics  Program: Reinforced  How Provided: Verbal, Demonstration  Provided to: Patient  Level of Understanding: Verbalized, Demonstrated  95459 - PT Self Care/Mgmt Minutes: 5              Time Calculation:   Start Time: 1534  Stop Time: 1617  Time Calculation (min): 43 min  Timed Charges  98206 - PT Therapeutic Exercise Minutes: 38  86905 - PT Self Care/Mgmt Minutes: 5  Total Minutes  Timed Charges Total Minutes: 43   Total Minutes: 43  Therapy Charges for Today     Code Description Service Date Service Provider Modifiers Qty    [id number] HC PT THER PROC EA 15 MIN 12/21/2022 Fernanda Calderon, PT GP 3                    Fernanda Calderon, PT  12/21/2022

## 2024-10-17 ENCOUNTER — PREP FOR SURGERY (OUTPATIENT)
Dept: OTHER | Facility: HOSPITAL | Age: 54
End: 2024-10-17
Payer: OTHER GOVERNMENT

## 2024-10-17 DIAGNOSIS — G89.29 CHRONIC KNEE PAIN AFTER TOTAL REPLACEMENT OF RIGHT KNEE JOINT: Primary | ICD-10-CM

## 2024-10-17 DIAGNOSIS — T84.022A INSTABILITY OF INTERNAL RIGHT KNEE PROSTHESIS, INITIAL ENCOUNTER: ICD-10-CM

## 2024-10-17 DIAGNOSIS — M25.561 CHRONIC KNEE PAIN AFTER TOTAL REPLACEMENT OF RIGHT KNEE JOINT: Primary | ICD-10-CM

## 2024-10-17 DIAGNOSIS — Z96.651 CHRONIC KNEE PAIN AFTER TOTAL REPLACEMENT OF RIGHT KNEE JOINT: Primary | ICD-10-CM

## 2024-10-17 DIAGNOSIS — Z96.651 HISTORY OF REVISION OF TOTAL REPLACEMENT OF RIGHT KNEE JOINT: ICD-10-CM

## 2024-10-17 RX ORDER — CHLORHEXIDINE GLUCONATE 500 MG/1
CLOTH TOPICAL DAILY
OUTPATIENT
Start: 2024-10-17

## 2024-10-17 RX ORDER — OXYCODONE HCL 10 MG/1
20 TABLET, FILM COATED, EXTENDED RELEASE ORAL ONCE
OUTPATIENT
Start: 2024-10-17 | End: 2024-10-17

## 2024-10-17 RX ORDER — ACETAMINOPHEN 10 MG/ML
1000 INJECTION, SOLUTION INTRAVENOUS ONCE
OUTPATIENT
Start: 2024-10-17 | End: 2024-10-17

## 2024-10-17 RX ORDER — CHLORHEXIDINE GLUCONATE 500 MG/1
CLOTH TOPICAL ONCE
OUTPATIENT
Start: 2024-10-17 | End: 2024-10-17

## 2024-10-17 RX ORDER — PREGABALIN 75 MG/1
75 CAPSULE ORAL ONCE
OUTPATIENT
Start: 2024-10-17 | End: 2024-10-17

## 2024-10-17 NOTE — H&P (VIEW-ONLY)
"HPI for follow up visit:  The patient presents today for an evaluation of her right knee.  She comes in today for evaluation and follow-up.  She was previously seen in the office.  She has previous history of right total knee arthroplasty with [facility]  She has had continued pain within the knee.  On the medial side.  She was seen in the office.  She was sent for a three-phase bone scan.  As well as serological studies.  She comes in today for review of these.     Erythrocyte sedimentation rate was normal at 23 on a 0-29 scale.  C-reactive protein was normal.  This was performed on September 17, 2024.  Three-phase bone scan was performed.     She denies any history of MRSA.  No history of DVT.  No significant cardiac issues.     Relevant point review of (Review of Systems Form, Injury Forms, and/or History Forms) dated 10/15/2024 was reviewed and all pertinent positives were reviewed and are available in the patient's chart     Vitals       Vitals:     10/15/24 0950   Weight: 191 lb (86.6 kg)   Height: 5' 4\" (1.626 m)               OBJECTIVE      General: Well-appearing with appropriate affect. No acute distress. Appears stated age.  Head: Normocephalic, atraumatic. Extraocular muscles intact  Neuro: Alert and oriented  Pulmonary: Respirations are unlabored  Psychiatric: Mood is appropriate for circumstances.     Musculoskeletal Exam:     Focused physical examination of the patient's right knee was performed.  She has previous incision over the right knee that is well-healed.  No erythema or warmth.  No knee effusion.  She has tenderness over the pes anserine bursa.  She has a little bit of flexion instability with varus and valgus stress in mid flexion.  As well as slight laxity anteriorly.  Extensor mechanism is intact.  Compartments are soft.  Sensations intact distally.  Foot warm well-perfused.        IMAGING:         Xrays:      No new x-rays in the " office today.  Previous x-rays as well as three-phase bone scan have been reviewed.     This reveals previous history of right total knee arthroplasty with press-fit components.  Previous screw fixation for ACL reconstruction.  Lysis around the tibial tray as well as around the anterior flange of the femoral component.        ASSESSMENT:            Assessment  Funmilayo was seen today for pain.  Diagnoses and all orders for this visit:  History of revision of right total knee arthroplasty (Primary)  Mechanical complication of internal orthopedic device <Initial> (CMS/[facility])           THE PLAN IS OUTLINED BELOW:      I did have an extensive discussion with the patient regards her situation in the office today.  I have reviewed the above.  Patient was seen and examined by Dr. Evans.  Three-phase bone scan as well as previous x-rays have been reviewed along with the serological studies.  No concerns for infectious etiology.  Current concerns for mechanical loosening of both the femoral and the tibial component given the osteolysis around knees.  She also has a little bit of instability.  Discussed with her treatment options.  She wishes to proceed with right total knee revision arthroplasty.  Risk, benefits, and outcomes were discussed.  She understands the risks.  She wishes to proceed with scheduling of right total knee revision arthroplasty.     Options and alternatives were discussed in detail with the patient.     The patient has reached a point of disability and has failed nonoperative management.       The patient is indicated for a right total knee revision arthroplasty      Likely,  Risks and benefits of the procedure including but not limited to infection, DVT, pulmonary embolism, future loosening of the implants, possibility of injury to nerves vessels and tendons, periprosthetic fractures have been discussed in detail.  Despite the risks involved,  The patient would like to proceed.       The patient  is  being scheduled for a right total knee revision arthroplasty on December 5, 2024 at [facility]     I will request for Medical and cardiac clearance from Radha Lang MD      Postoperative DVT prophylaxis - Patient has no high risk factors  Plan for Aspirin .      Preoperative antibiotic prophylaxis - Plan for SCIP protocol with Cephazolin weight based.         Surgery will be scheduled for postoperative observation                                   -----------------------------------------------------------------------------------------------------------------------------------------------                  ALLERGIES  Allergies         Allergies   Allergen Reactions    Amlodipine Swelling    Nickel Itching       Other reaction(s): Joint pain     PAIN     Other Reaction(s): Not available         PAST MEDICAL HISTORY  Medical History        Past Medical History:   Diagnosis Date    Chronic pain      Hypertension      Injury of knee      Intervertebral disc prolapse           FAMILY HISTORY  Family History          Family History   Problem Relation Name Age of Onset    Uterine cancer Mother's Sister        Colon cancer Mother's Brother             SOCIAL HISTORY  Social History   Social History            Socioeconomic History    Marital status: Single       Spouse name: None    Number of children: None    Years of education: None    Highest education level: None   Occupational History    None   Tobacco Use    Smoking status: Never    Smokeless tobacco: Never   Substance and Sexual Activity    Alcohol use: Not Currently    Drug use: Never    Sexual activity: Not Currently   Other Topics Concern    None   Social History Narrative    None      Social Drivers of Health      Financial Resource Strain: Not on file   Food Insecurity: Not on file   Transportation Needs: Not on file   Physical Activity: Not on file   Stress: Not on file   Social Connections: Not on file   Intimate Partner Violence: Not on file    Housing Stability: Not on file         SURGICAL HISTORY  Surgical History         Past Surgical History:   Procedure Laterality Date    HYSTERECTOMY        KNEE LIGAMENT RECONSTRUCTION   2017    REPLACEMENT TOTAL KNEE ONCOLOGIC Right 09/24/2020         CURRENT MEDICATIONS  Outpatient meds: [unfilled]     =====================================================================================================       Matthew Marcum PA-C     [phone]  [facility]  Orthopedics  [address], [address]  [place], [place] [address] [phone]  [facility]  Orthopedics  [address], [address]  [place] [address]     Parts of this note may have been created by a chart review, combined by taking my own patient history. The patient was physically seen and examined by myself, including a personal review of images, tests, and formation of the impression and plan. In addition, this note may been dictated utilizing voice recognition software. Unfortunately this leads to occasional typographical errors. I apologize in advance if the situation occurs. If questions occur about dictation mistakes, please do not hesitate to call our office. The patient was advised to call with any issues or concerns in the future.                          Progress Notes  Nancy Evans MD (Physician)  Orthopaedic Surgery  Expand All Collapse All  Funmilayo Concepcion  presents today for Pain of the Right Knee (TKA - 9/24/2020)      HPI:   Funmilayo Concepcion 54 y.o. female presents today for persistent pain in her right knee.  [facility] Pradip Aleman     She was advised to have a three-phase bone scan in the previous office visit.  She is here to discuss the results.     Her history is significant for a ACL injury that was managed with [facility].  Subsequently she continued to have pain in the knee and  underwent a right total knee replacement by Dr. Green around 2020.  She had persistent pain and presented to him and subsequently underwent a revision right total knee replacement for aseptic loosening/possible allergic reaction to the right knee with a revision femoral component in September 2022.     She had persistent pain and subsequently had nerve ablation which did not help.     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  She points out mostly to the medial aspect of the knee.  She has been using ibuprofen for pain relief.  She states that every step hurts.     She has been diagnosed with nickel allergy.     Denies any MRSA, DVT, cardiac problems.  She is otherwise very healthy and active.  Denies the use of any assistive device.        Medications:  Medications Ordered Prior to Encounter          Current Outpatient Medications on File Prior to Visit   Medication Sig Dispense Refill    azithromycin (Zithromax) 250 MG tablet On first day, take 2 tablets. Then takes one tablet daily for next four days. Start taking after doxycycline completed 6 tablet 0    estradiol (Climara) 0.05 MG/24HR APPLY 1 PATCH TO EXTERNAL AREA ONCE A WEEK        hydroCHLOROthiazide (HYDRODiuril) 25 MG tablet Take 0.5 tablets by mouth 1 (one) time each day.        ibuprofen 800 MG tablet 800 mg.        losartan (Cozaar) 25 MG tablet 25 mg.          No current facility-administered medications on file prior to visit.            Allergies:  Allergies         Allergies   Allergen Reactions    Amlodipine Swelling    Nickel Itching       Other reaction(s): Joint pain     PAIN     Other Reaction(s): Not available            Social Hx:  Social History          Substance and Sexual Activity   Alcohol Use Not Currently      Tobacco Use History   Social History          Tobacco Use   Smoking Status Never   Smokeless Tobacco Never            Surgical Hx  Surgical History         Past Surgical History:  "  Procedure Laterality Date    HYSTERECTOMY        KNEE LIGAMENT RECONSTRUCTION   2017    REPLACEMENT TOTAL KNEE ONCOLOGIC Right 09/24/2020            Past Med Hx  Medical History        Past Medical History:   Diagnosis Date    Chronic pain      Hypertension      Injury of knee      Intervertebral disc prolapse                 Vitals  Visit Vitals  Ht 5' 4\" (1.626 m)   Wt 191 lb (86.6 kg)   BMI 32.79 kg/m²   Smoking Status Never   BSA 1.98 m²            Review of Systems:  12 systems are reviewed and are negative other than what is stated in HPI     Physical Exam:     General Appearance:Patient is awake, alert, and oriented and appears in no acute distress  Head:Atraumatic, normocephalic  Eyes:EOMs are intact  Lungs:Patient is in no acute respiratory distress  Neurologic:No acute neurological deficits or lateralizing findings  Peripheral Pulses:No sign of acute vascular compromise  Skin:No sign of acute infection     Musculoskeletal:      GAIT -no limp, ambulates without assistive device     Right KNEE     Mature Surgical Scar  No significant effusion in knee  ACTIVE ROM 0 - 100 degrees, relatively painless and free.   Patella is tracking well  No Varus Valgus instability  Pulses are intact distally.   Patient is able to dorsiflex and plantarflex the ankle as well as flex and extend toes.   Capillary refill is brisk and less than 2 seconds.        Imaging:       NM bone scan 3 Phase  Examination: NM Bone Scan 3 Phase     Exam date:  10/07/2024 at 14:27     Accession number: [id number]     Indication: Painful right total knee arthroplasty.     Comparison: Radiographs of the right knee dated 09/17/2024, 08/14/2023, 01/09/2023, 07/21/2022, and 10/06/2021. MRI imaging of the right knee dated 10/12/2023 and 08/21/2020.     Technique: Triphasic skeletal scintigraphy was performed over the knees following the intravenous administration of 20.5 mCi of technetium 99 MDP administered via left wrist angiocatheter.   "   Findings:       Dynamic perfusion imaging acquired in the anterior and posterior orientations shows a very mild degree of asymmetric hyperemia about the right knee.     The soft tissue blood pool phase of imaging acquired in the anterior, posterior, and each lateral orientation shows a photon deficient right total knee arthroplasty with periprosthetic activity surrounding the femoral components of the right total knee arthroplasty and transversely oriented isotope activity along the periarticular surfaces along the upper portions of the tibial component of the right knee arthroplasty. No abnormal activity associated with the contralateral left knee on soft tissue blood pool phase of imaging.     The delayed osseous images acquired in the anterior, posterior, and each lateral orientation show a similar distribution of periprosthetic isotope surrounding the femoral and the proximal tibial components of the right total knee arthroplasty similar to the distribution seen on the extracellular soft tissue blood pool phase of imaging separate that the tibial activity extends downward toward the meta-epiphyseal junction. There is also a mild degree of isotope activity localizing in the right and right patella. Some minor speckled isotope activity is associated with the contralateral left knee related to degenerative change.     Impression: Generalized periprosthetic isotope activity surrounds the femoral and tibial components of the indwelling right total knee arthroplasty in a nonspecific pattern.     When comparing recent radiographs of the right knee from 09/17/2024 to those acquired 3 years ago on 10/06/2023, there are no findings of worsening osteolysis seen about the components of the right knee arthroplasty, though there is some mild increasing curvilinear spurring about the medial and lateral portions of the transversely oriented tibial prosthetic components.     Dictated by: Reza Connors M.D. Signed by Reza  DAVIDE Cristobal on 10/7/2024 14:52  ##### Final #####     Dictated by:    Kenroy CRISTOBAL MD-RAD  Dictated DT/TM: 10/07/2024 2:52 pm  Interpreted and electronically signed by:  Kenroy CRISTOBAL MD-RAD  Signed DT/TM:  10/07/2024 2:52 pm        Assessment:     1. History of revision of right total knee arthroplasty    2. Mechanical complication of internal orthopedic device <Initial> (CMS/[facility])    3. Chronic pain following right total knee arthroplasty <Initial> (CMS/[facility])             Treatment Plan:      The patient continues to have significant pain after total knee replacement.     The bone scan does confirm diffuse increased uptake around both tibia and femur in the delayed phases.     We will also work her up for any low-grade infection.  I have sent her for C-reactive protein and ESR.  Her C-reactive protein was 5.4 and ESR was 23 both within normal limits.     Options and alternatives were discussed in detail with the patient.     The patient has reached a point of disability and has failed nonoperative management.       The patient is indicated for a revision right total knee arthroplasty.     Likely,  Risks and benefits of the procedure including but not limited to infection, DVT, pulmonary embolism, future loosening of the implants, possibility of injury to nerves vessels and tendons, periprosthetic fractures have been discussed in detail.  Despite the risks involved,  The patient would like to proceed.       The patient  is being scheduled for a revision right total knee arthroplasty  at [facility] November 1, 2024.      Postoperative DVT prophylaxis - Patient has no high risk factors  Plan for Aspirin .      Preoperative antibiotic prophylaxis - Plan for SCIP protocol with Cephazolin weight based.   Will give Vancomycin in addition due to revision surgery.     Surgery will be scheduled for  for inpatient status due to revision surgery.

## 2024-10-17 NOTE — H&P
"HPI for follow up visit:  The patient presents today for an evaluation of her right knee.  She comes in today for evaluation and follow-up.  She was previously seen in the office.  She has previous history of right total knee arthroplasty with [facility]  She has had continued pain within the knee.  On the medial side.  She was seen in the office.  She was sent for a three-phase bone scan.  As well as serological studies.  She comes in today for review of these.     Erythrocyte sedimentation rate was normal at 23 on a 0-29 scale.  C-reactive protein was normal.  This was performed on September 17, 2024.  Three-phase bone scan was performed.     She denies any history of MRSA.  No history of DVT.  No significant cardiac issues.     Relevant point review of (Review of Systems Form, Injury Forms, and/or History Forms) dated 10/15/2024 was reviewed and all pertinent positives were reviewed and are available in the patient's chart     Vitals       Vitals:     10/15/24 0950   Weight: 191 lb (86.6 kg)   Height: 5' 4\" (1.626 m)               OBJECTIVE      General: Well-appearing with appropriate affect. No acute distress. Appears stated age.  Head: Normocephalic, atraumatic. Extraocular muscles intact  Neuro: Alert and oriented  Pulmonary: Respirations are unlabored  Psychiatric: Mood is appropriate for circumstances.     Musculoskeletal Exam:     Focused physical examination of the patient's right knee was performed.  She has previous incision over the right knee that is well-healed.  No erythema or warmth.  No knee effusion.  She has tenderness over the pes anserine bursa.  She has a little bit of flexion instability with varus and valgus stress in mid flexion.  As well as slight laxity anteriorly.  Extensor mechanism is intact.  Compartments are soft.  Sensations intact distally.  Foot warm well-perfused.        IMAGING:         Xrays:      No new x-rays in the " office today.  Previous x-rays as well as three-phase bone scan have been reviewed.     This reveals previous history of right total knee arthroplasty with press-fit components.  Previous screw fixation for ACL reconstruction.  Lysis around the tibial tray as well as around the anterior flange of the femoral component.        ASSESSMENT:            Assessment  Funmilayo was seen today for pain.  Diagnoses and all orders for this visit:  History of revision of right total knee arthroplasty (Primary)  Mechanical complication of internal orthopedic device <Initial> (CMS/[facility])           THE PLAN IS OUTLINED BELOW:      I did have an extensive discussion with the patient regards her situation in the office today.  I have reviewed the above.  Patient was seen and examined by Dr. Evans.  Three-phase bone scan as well as previous x-rays have been reviewed along with the serological studies.  No concerns for infectious etiology.  Current concerns for mechanical loosening of both the femoral and the tibial component given the osteolysis around knees.  She also has a little bit of instability.  Discussed with her treatment options.  She wishes to proceed with right total knee revision arthroplasty.  Risk, benefits, and outcomes were discussed.  She understands the risks.  She wishes to proceed with scheduling of right total knee revision arthroplasty.     Options and alternatives were discussed in detail with the patient.     The patient has reached a point of disability and has failed nonoperative management.       The patient is indicated for a right total knee revision arthroplasty      Likely,  Risks and benefits of the procedure including but not limited to infection, DVT, pulmonary embolism, future loosening of the implants, possibility of injury to nerves vessels and tendons, periprosthetic fractures have been discussed in detail.  Despite the risks involved,  The patient would like to proceed.       The patient  is  being scheduled for a right total knee revision arthroplasty on December 5, 2024 at [facility]     I will request for Medical and cardiac clearance from Radha Lang MD      Postoperative DVT prophylaxis - Patient has no high risk factors  Plan for Aspirin .      Preoperative antibiotic prophylaxis - Plan for SCIP protocol with Cephazolin weight based.         Surgery will be scheduled for postoperative observation                                   -----------------------------------------------------------------------------------------------------------------------------------------------                  ALLERGIES  Allergies         Allergies   Allergen Reactions    Amlodipine Swelling    Nickel Itching       Other reaction(s): Joint pain     PAIN     Other Reaction(s): Not available         PAST MEDICAL HISTORY  Medical History        Past Medical History:   Diagnosis Date    Chronic pain      Hypertension      Injury of knee      Intervertebral disc prolapse           FAMILY HISTORY  Family History          Family History   Problem Relation Name Age of Onset    Uterine cancer Mother's Sister        Colon cancer Mother's Brother             SOCIAL HISTORY  Social History   Social History            Socioeconomic History    Marital status: Single       Spouse name: None    Number of children: None    Years of education: None    Highest education level: None   Occupational History    None   Tobacco Use    Smoking status: Never    Smokeless tobacco: Never   Substance and Sexual Activity    Alcohol use: Not Currently    Drug use: Never    Sexual activity: Not Currently   Other Topics Concern    None   Social History Narrative    None      Social Drivers of Health      Financial Resource Strain: Not on file   Food Insecurity: Not on file   Transportation Needs: Not on file   Physical Activity: Not on file   Stress: Not on file   Social Connections: Not on file   Intimate Partner Violence: Not on file    Housing Stability: Not on file         SURGICAL HISTORY  Surgical History         Past Surgical History:   Procedure Laterality Date    HYSTERECTOMY        KNEE LIGAMENT RECONSTRUCTION   2017    REPLACEMENT TOTAL KNEE ONCOLOGIC Right 09/24/2020         CURRENT MEDICATIONS  Outpatient meds: [unfilled]     =====================================================================================================       Matthew Marcum PA-C     [phone]  [facility]  Orthopedics  [address], [address]  [place], [place] [address] [phone]  [facility]  Orthopedics  [address], [address]  [place] [address]     Parts of this note may have been created by a chart review, combined by taking my own patient history. The patient was physically seen and examined by myself, including a personal review of images, tests, and formation of the impression and plan. In addition, this note may been dictated utilizing voice recognition software. Unfortunately this leads to occasional typographical errors. I apologize in advance if the situation occurs. If questions occur about dictation mistakes, please do not hesitate to call our office. The patient was advised to call with any issues or concerns in the future.                          Progress Notes  Nancy Evans MD (Physician)  Orthopaedic Surgery  Expand All Collapse All  Funmilayo Concepcion  presents today for Pain of the Right Knee (TKA - 9/24/2020)      HPI:   Funmilayo Concepcion 54 y.o. female presents today for persistent pain in her right knee.  [facility] Pradip Aleman     She was advised to have a three-phase bone scan in the previous office visit.  She is here to discuss the results.     Her history is significant for a ACL injury that was [facility].  Subsequently she continued to have pain in the knee and  underwent a right total knee replacement by Dr. Green around 2020.  She had persistent pain and presented to him and subsequently underwent a revision right total knee replacement for aseptic loosening/possible allergic reaction to the right knee with a revision femoral component in September 2022.     She had persistent pain and subsequently had nerve ablation which did not help.     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  She points out mostly to the medial aspect of the knee.  She has been using ibuprofen for pain relief.  She states that every step hurts.     She has been diagnosed with nickel allergy.     Denies any MRSA, DVT, cardiac problems.  She is otherwise very healthy and active.  Denies the use of any assistive device.        Medications:  Medications Ordered Prior to Encounter          Current Outpatient Medications on File Prior to Visit   Medication Sig Dispense Refill    azithromycin (Zithromax) 250 MG tablet On first day, take 2 tablets. Then takes one tablet daily for next four days. Start taking after doxycycline completed 6 tablet 0    estradiol (Climara) 0.05 MG/24HR APPLY 1 PATCH TO EXTERNAL AREA ONCE A WEEK        hydroCHLOROthiazide (HYDRODiuril) 25 MG tablet Take 0.5 tablets by mouth 1 (one) time each day.        ibuprofen 800 MG tablet 800 mg.        losartan (Cozaar) 25 MG tablet 25 mg.          No current facility-administered medications on file prior to visit.            Allergies:  Allergies         Allergies   Allergen Reactions    Amlodipine Swelling    Nickel Itching       Other reaction(s): Joint pain     PAIN     Other Reaction(s): Not available            Social Hx:  Social History          Substance and Sexual Activity   Alcohol Use Not Currently      Tobacco Use History   Social History          Tobacco Use   Smoking Status Never   Smokeless Tobacco Never            Surgical Hx  Surgical History         Past Surgical History:  "  Procedure Laterality Date    HYSTERECTOMY        KNEE LIGAMENT RECONSTRUCTION   2017    REPLACEMENT TOTAL KNEE ONCOLOGIC Right 09/24/2020            Past Med Hx  Medical History        Past Medical History:   Diagnosis Date    Chronic pain      Hypertension      Injury of knee      Intervertebral disc prolapse                 Vitals  Visit Vitals  Ht 5' 4\" (1.626 m)   Wt 191 lb (86.6 kg)   BMI 32.79 kg/m²   Smoking Status Never   BSA 1.98 m²            Review of Systems:  12 systems are reviewed and are negative other than what is stated in HPI     Physical Exam:     General Appearance:Patient is awake, alert, and oriented and appears in no acute distress  Head:Atraumatic, normocephalic  Eyes:EOMs are intact  Lungs:Patient is in no acute respiratory distress  Neurologic:No acute neurological deficits or lateralizing findings  Peripheral Pulses:No sign of acute vascular compromise  Skin:No sign of acute infection     Musculoskeletal:      GAIT -no limp, ambulates without assistive device     Right KNEE     Mature Surgical Scar  No significant effusion in knee  ACTIVE ROM 0 - 100 degrees, relatively painless and free.   Patella is tracking well  No Varus Valgus instability  Pulses are intact distally.   Patient is able to dorsiflex and plantarflex the ankle as well as flex and extend toes.   Capillary refill is brisk and less than 2 seconds.        Imaging:       NM bone scan 3 Phase  Examination: NM Bone Scan 3 Phase     Exam date:  10/07/2024 at 14:27     Accession number: [id number]     Indication: Painful right total knee arthroplasty.     Comparison: Radiographs of the right knee dated 09/17/2024, 08/14/2023, 01/09/2023, 07/21/2022, and 10/06/2021. MRI imaging of the right knee dated 10/12/2023 and 08/21/2020.     Technique: Triphasic skeletal scintigraphy was performed over the knees following the intravenous administration of 20.5 mCi of technetium 99 MDP administered via left wrist angiocatheter.   "   Findings:       Dynamic perfusion imaging acquired in the anterior and posterior orientations shows a very mild degree of asymmetric hyperemia about the right knee.     The soft tissue blood pool phase of imaging acquired in the anterior, posterior, and each lateral orientation shows a photon deficient right total knee arthroplasty with periprosthetic activity surrounding the femoral components of the right total knee arthroplasty and transversely oriented isotope activity along the periarticular surfaces along the upper portions of the tibial component of the right knee arthroplasty. No abnormal activity associated with the contralateral left knee on soft tissue blood pool phase of imaging.     The delayed osseous images acquired in the anterior, posterior, and each lateral orientation show a similar distribution of periprosthetic isotope surrounding the femoral and the proximal tibial components of the right total knee arthroplasty similar to the distribution seen on the extracellular soft tissue blood pool phase of imaging separate that the tibial activity extends downward toward the meta-epiphyseal junction. There is also a mild degree of isotope activity localizing in the right and right patella. Some minor speckled isotope activity is associated with the contralateral left knee related to degenerative change.     Impression: Generalized periprosthetic isotope activity surrounds the femoral and tibial components of the indwelling right total knee arthroplasty in a nonspecific pattern.     When comparing recent radiographs of the right knee from 09/17/2024 to those acquired 3 years ago on 10/06/2023, there are no findings of worsening osteolysis seen about the components of the right knee arthroplasty, though there is some mild increasing curvilinear spurring about the medial and lateral portions of the transversely oriented tibial prosthetic components.     Dictated by: Reza Connors M.D. Signed by Reza  DAVIDE Cristobal on 10/7/2024 14:52  ##### Final #####     Dictated by:    Kenroy CRISTOBAL MD-RAD  Dictated DT/TM: 10/07/2024 2:52 pm  Interpreted and electronically signed by:  Kenroy CRISTOBAL MD-RAD  Signed DT/TM:  10/07/2024 2:52 pm        Assessment:     1. History of revision of right total knee arthroplasty    2. Mechanical complication of internal orthopedic device <Initial> (CMS/[facility])    3. Chronic pain following right total knee arthroplasty <Initial> (CMS/[facility])             Treatment Plan:      The patient continues to have significant pain after total knee replacement.     The bone scan does confirm diffuse increased uptake around both tibia and femur in the delayed phases.     We will also work her up for any low-grade infection.  I have sent her for C-reactive protein and ESR.  Her C-reactive protein was 5.4 and ESR was 23 both within normal limits.     Options and alternatives were discussed in detail with the patient.     The patient has reached a point of disability and has failed nonoperative management.       The patient is indicated for a revision right total knee arthroplasty.     Likely,  Risks and benefits of the procedure including but not limited to infection, DVT, pulmonary embolism, future loosening of the implants, possibility of injury to nerves vessels and tendons, periprosthetic fractures have been discussed in detail.  Despite the risks involved,  The patient would like to proceed.       The patient  is being scheduled for a revision right total knee arthroplasty  at [facility] November 1, 2024.      Postoperative DVT prophylaxis - Patient has no high risk factors  Plan for Aspirin .      Preoperative antibiotic prophylaxis - Plan for SCIP protocol with Cephazolin weight based.   Will give Vancomycin in addition due to revision surgery.     Surgery will be scheduled for  for inpatient status due to revision surgery.

## 2024-10-25 ENCOUNTER — HOSPITAL ENCOUNTER (OUTPATIENT)
Dept: GENERAL RADIOLOGY | Facility: HOSPITAL | Age: 54
Discharge: HOME | End: 2024-10-25
Payer: OTHER GOVERNMENT

## 2024-10-25 ENCOUNTER — PRE-ADMISSION TESTING (OUTPATIENT)
Dept: PREADMISSION TESTING | Facility: HOSPITAL | Age: 54
End: 2024-10-25
Payer: OTHER GOVERNMENT

## 2024-10-25 VITALS
HEIGHT: 66 IN | BODY MASS INDEX: 30.58 KG/M2 | SYSTOLIC BLOOD PRESSURE: 147 MMHG | TEMPERATURE: 98.1 F | WEIGHT: 190.3 LBS | HEART RATE: 88 BPM | DIASTOLIC BLOOD PRESSURE: 87 MMHG | OXYGEN SATURATION: 98 % | RESPIRATION RATE: 20 BRPM

## 2024-10-25 LAB
ALBUMIN SERPL-MCNC: 3.8 G/DL (ref 3.5–5.2)
ALBUMIN/GLOB SERPL: 1.2 G/DL
ALP SERPL-CCNC: 134 U/L (ref 39–117)
ALT SERPL W P-5'-P-CCNC: 14 U/L (ref 1–33)
ANION GAP SERPL CALCULATED.3IONS-SCNC: 12 MMOL/L (ref 5–15)
APTT PPP: 25.5 SECONDS (ref 22.7–35.4)
AST SERPL-CCNC: 15 U/L (ref 1–32)
BACTERIA UR QL AUTO: NORMAL /HPF
BASOPHILS # BLD AUTO: 0.05 10*3/MM3 (ref 0–0.2)
BASOPHILS NFR BLD AUTO: 0.8 % (ref 0–1.5)
BILIRUB SERPL-MCNC: 0.5 MG/DL (ref 0–1.2)
BILIRUB UR QL STRIP: NEGATIVE
BUN SERPL-MCNC: 14 MG/DL (ref 6–20)
BUN/CREAT SERPL: 11.1 (ref 7–25)
CALCIUM SPEC-SCNC: 9.2 MG/DL (ref 8.6–10.5)
CHLORIDE SERPL-SCNC: 102 MMOL/L (ref 98–107)
CLARITY UR: CLEAR
CO2 SERPL-SCNC: 26 MMOL/L (ref 22–29)
COLOR UR: YELLOW
CREAT SERPL-MCNC: 1.26 MG/DL (ref 0.57–1)
DEPRECATED RDW RBC AUTO: 42.7 FL (ref 37–54)
EGFRCR SERPLBLD CKD-EPI 2021: 50.5 ML/MIN/1.73
EOSINOPHIL # BLD AUTO: 0.09 10*3/MM3 (ref 0–0.4)
EOSINOPHIL NFR BLD AUTO: 1.5 % (ref 0.3–6.2)
ERYTHROCYTE [DISTWIDTH] IN BLOOD BY AUTOMATED COUNT: 12.4 % (ref 12.3–15.4)
GLOBULIN UR ELPH-MCNC: 3.3 GM/DL
GLUCOSE SERPL-MCNC: 83 MG/DL (ref 65–99)
GLUCOSE UR STRIP-MCNC: NEGATIVE MG/DL
HBA1C MFR BLD: 5.2 % (ref 4.8–5.6)
HCT VFR BLD AUTO: 38.7 % (ref 34–46.6)
HGB BLD-MCNC: 13.2 G/DL (ref 12–15.9)
HGB UR QL STRIP.AUTO: NEGATIVE
HYALINE CASTS UR QL AUTO: NORMAL /LPF
IMM GRANULOCYTES # BLD AUTO: 0.01 10*3/MM3 (ref 0–0.05)
IMM GRANULOCYTES NFR BLD AUTO: 0.2 % (ref 0–0.5)
INR PPP: 1.01 (ref 0.9–1.1)
KETONES UR QL STRIP: ABNORMAL
LEUKOCYTE ESTERASE UR QL STRIP.AUTO: ABNORMAL
LYMPHOCYTES # BLD AUTO: 1.93 10*3/MM3 (ref 0.7–3.1)
LYMPHOCYTES NFR BLD AUTO: 32 % (ref 19.6–45.3)
MCH RBC QN AUTO: 31.7 PG (ref 26.6–33)
MCHC RBC AUTO-ENTMCNC: 34.1 G/DL (ref 31.5–35.7)
MCV RBC AUTO: 92.8 FL (ref 79–97)
MONOCYTES # BLD AUTO: 0.58 10*3/MM3 (ref 0.1–0.9)
MONOCYTES NFR BLD AUTO: 9.6 % (ref 5–12)
NEUTROPHILS NFR BLD AUTO: 3.38 10*3/MM3 (ref 1.7–7)
NEUTROPHILS NFR BLD AUTO: 55.9 % (ref 42.7–76)
NITRITE UR QL STRIP: NEGATIVE
NRBC BLD AUTO-RTO: 0 /100 WBC (ref 0–0.2)
PH UR STRIP.AUTO: 6 [PH] (ref 5–8)
PLATELET # BLD AUTO: 366 10*3/MM3 (ref 140–450)
PMV BLD AUTO: 8.4 FL (ref 6–12)
POTASSIUM SERPL-SCNC: 3.8 MMOL/L (ref 3.5–5.2)
PROT SERPL-MCNC: 7.1 G/DL (ref 6–8.5)
PROT UR QL STRIP: NEGATIVE
PROTHROMBIN TIME: 13.5 SECONDS (ref 11.7–14.2)
RBC # BLD AUTO: 4.17 10*6/MM3 (ref 3.77–5.28)
RBC # UR STRIP: NORMAL /HPF
REF LAB TEST METHOD: NORMAL
SODIUM SERPL-SCNC: 140 MMOL/L (ref 136–145)
SP GR UR STRIP: 1.03 (ref 1–1.03)
SQUAMOUS #/AREA URNS HPF: NORMAL /HPF
UROBILINOGEN UR QL STRIP: ABNORMAL
WBC # UR STRIP: NORMAL /HPF
WBC NRBC COR # BLD AUTO: 6.04 10*3/MM3 (ref 3.4–10.8)

## 2024-10-25 PROCEDURE — 93005 ELECTROCARDIOGRAM TRACING: CPT

## 2024-10-25 PROCEDURE — 87086 URINE CULTURE/COLONY COUNT: CPT

## 2024-10-25 PROCEDURE — 87081 CULTURE SCREEN ONLY: CPT

## 2024-10-25 PROCEDURE — 85730 THROMBOPLASTIN TIME PARTIAL: CPT

## 2024-10-25 PROCEDURE — 71046 X-RAY EXAM CHEST 2 VIEWS: CPT

## 2024-10-25 PROCEDURE — 81001 URINALYSIS AUTO W/SCOPE: CPT

## 2024-10-25 PROCEDURE — 93010 ELECTROCARDIOGRAM REPORT: CPT | Performed by: INTERNAL MEDICINE

## 2024-10-25 PROCEDURE — 80053 COMPREHEN METABOLIC PANEL: CPT

## 2024-10-25 PROCEDURE — 85610 PROTHROMBIN TIME: CPT

## 2024-10-25 PROCEDURE — 36415 COLL VENOUS BLD VENIPUNCTURE: CPT

## 2024-10-25 PROCEDURE — 83036 HEMOGLOBIN GLYCOSYLATED A1C: CPT

## 2024-10-25 PROCEDURE — 85025 COMPLETE CBC W/AUTO DIFF WBC: CPT

## 2024-10-25 NOTE — DISCHARGE INSTRUCTIONS
CHLORHEXIDINE CLOTH INSTRUCTIONS  The morning of surgery follow these instructions using the Chlorhexidine cloths you've been given.  These steps reduce bacteria on the body.  Do not use the cloths near your eyes, ears mouth, genitalia or on open wounds.  Throw the cloths away after use but do not try to flush them down a toilet.      Open and remove one cloth at a time from the package.    Leave the cloth unfolded and begin the bathing.  Massage the skin with the cloths using gentle pressure to remove bacteria.  Do not scrub harshly.   Follow the steps below with one 2% CHG cloth per area (6 total cloths).  One cloth for neck, shoulders and chest.  One cloth for both arms, hands, fingers and underarms (do underarms last).  One cloth for the abdomen followed by groin.  One cloth for right leg and foot including between the toes.  One cloth for left leg and foot including between the toes.  The last cloth is to be used for the back of the neck, back and buttocks.    Allow the CHG to air dry 3 minutes on the skin which will give it time to work and decrease the chance of irritation.  The skin may feel sticky until it is dry.  Do not rinse with water or any other liquid or you will lose the beneficial effects of the CHG.  If mild skin irritation occurs, do rinse the skin to remove the CHG.  Report this to the nurse at time of admission.  Do not apply lotions, creams, ointments, deodorants or perfumes after using the clothes. Dress in clean clothes before coming to the hospital.    Take the following medications the morning of surgery:  NONE      If you are on prescription narcotic pain medication to control your pain you may also take that medication the morning of surgery.      General Instructions:     Do not eat solid food after midnight the night before surgery.  Clear liquids day of surgery are allowed but must be stopped at least two hours before your hospital arrival time.       Allowed clear liquids      Water,  sodas, and tea or coffee with no cream or milk added.       12 to 20 ounces of a clear liquid that contains carbohydrates is recommended.  If non-diabetic, have Gatorade or Powerade.  If diabetic, have G2 or Powerade Zero.     Do not have liquids red in color.  Do not consume chicken, beef, pork or vegetable broth or bouillon cubes of any variety as they are not considered clear liquids and are not allowed.      Infants may have breast milk up to four hours before surgery.  Infants drinking formula may drink formula up to six hours before surgery.   Patients who avoid smoking, chewing tobacco and alcohol for 4 weeks prior to surgery have a reduced risk of post-operative complications.  Quit smoking as many days before surgery as you can.  Do not smoke, use chewing tobacco or drink alcohol the day of surgery.   If applicable bring your C-PAP/ BI-PAP machine in with you to preop day of surgery.  Bring any papers given to you in the doctor’s office.  Wear clean comfortable clothes.  Do not wear contact lenses, false eyelashes or make-up.  Bring a case for your glasses.   Bring crutches or walker if applicable.  Remove all piercings.  Leave jewelry and any other valuables at home.  Hair extensions with metal clips must be removed prior to surgery.  The Pre-Admission Testing nurse will instruct you to bring medications if unable to obtain an accurate list in Pre-Admission Testing.            Preventing a Surgical Site Infection:  For 2 to 3 days before surgery, avoid shaving with a razor because the razor can irritate skin and make it easier to develop an infection.    Any areas of open skin can increase the risk of a post-operative wound infection by allowing bacteria to enter and travel throughout the body.  Notify your surgeon if you have any skin wounds / rashes even if it is not near the expected surgical site.  The area will need assessed to determine if surgery should be delayed until it is healed.  The night  prior to surgery shower using a fresh bar of anti-bacterial soap (such as Dial) and clean washcloth.  Sleep in a clean bed with clean clothing.  Do not allow pets to sleep with you.  Shower on the morning of surgery using a fresh bar of anti-bacterial soap (such as Dial) and clean washcloth.  Dry with a clean towel and dress in clean clothing.  Ask your surgeon if you will be receiving antibiotics prior to surgery.  Make sure you, your family, and all healthcare providers clean their hands with soap and water or an alcohol based hand  before caring for you or your wound.    Day of surgery:  Your arrival time is approximately two hours before your scheduled surgery time.  Please note if you have an early arrival time the surgery doors do not open before 5:00 AM.  Upon arrival, a Pre-op nurse and Anesthesiologist will review your health history, obtain vital signs, and answer questions you may have.  The only belongings needed at this time will be a list of your home medications and if applicable your C-PAP/BI-PAP machine.  A Pre-op nurse will start an IV and you may receive medication in preparation for surgery, including something to help you relax.     Please be aware that surgery does come with discomfort.  We want to make every effort to control your discomfort so please discuss any uncontrolled symptoms with your nurse.   Your doctor will most likely have prescribed pain medications.      If you are going home after surgery you will receive individualized written care instructions before being discharged.  A responsible adult must drive you to and from the hospital on the day of your surgery and ideally stay with you through the night.   .  Discharge prescriptions can be filled by the hospital pharmacy during regular pharmacy hours.  If you are having surgery late in the day/evening your prescription may be e-prescribed to your pharmacy.  Please verify your pharmacy hours or chose a 24 hour pharmacy to  avoid not having access to your prescription because your pharmacy has closed for the day.    If you are staying overnight following surgery, you will be transported to your hospital room following the recovery period.  [facility] has all private rooms.    If you have any questions please call Pre-Admission Testing at [phone].  Deductibles and co-payments are collected on the day of service. Please be prepared to pay the required co-pay, deductible or deposit on the day of service as defined by your plan.    Call your surgeon immediately if you experience any of the following symptoms:  Sore Throat  Shortness of Breath or difficulty breathing  Cough  Chills  Body soreness or muscle pain  Headache  Fever  New loss of taste or smell  Do not arrive for your surgery ill.  Your procedure will need to be rescheduled to another time.  You will need to call your physician before the day of surgery to avoid any unnecessary exposure to hospital staff as well as other patients.

## 2024-10-26 LAB
MRSA SPEC QL CULT: NORMAL
QT INTERVAL: 386 MS
QTC INTERVAL: 414 MS

## 2024-10-29 ENCOUNTER — TRANSCRIBE ORDERS (OUTPATIENT)
Dept: LAB | Facility: HOSPITAL | Age: 54
End: 2024-10-29
Payer: OTHER GOVERNMENT

## 2024-10-29 DIAGNOSIS — Z01.818 PRE-OP EXAMINATION: Primary | ICD-10-CM

## 2024-10-29 PROCEDURE — 87086 URINE CULTURE/COLONY COUNT: CPT

## 2024-10-30 LAB — BACTERIA SPEC AEROBE CULT: NO GROWTH

## 2024-11-01 ENCOUNTER — ANESTHESIA EVENT (OUTPATIENT)
Dept: PERIOP | Facility: HOSPITAL | Age: 54
End: 2024-11-01
Payer: OTHER GOVERNMENT

## 2024-11-01 ENCOUNTER — ANESTHESIA (OUTPATIENT)
Dept: PERIOP | Facility: HOSPITAL | Age: 54
End: 2024-11-01
Payer: OTHER GOVERNMENT

## 2024-11-01 ENCOUNTER — APPOINTMENT (OUTPATIENT)
Dept: GENERAL RADIOLOGY | Facility: HOSPITAL | Age: 54
DRG: 467 | End: 2024-11-01
Payer: OTHER GOVERNMENT

## 2024-11-01 ENCOUNTER — HOSPITAL ENCOUNTER (INPATIENT)
Facility: HOSPITAL | Age: 54
LOS: 1 days | Discharge: HOME HEALTH | DRG: 467 | End: 2024-11-02
Attending: ORTHOPAEDIC SURGERY | Admitting: ORTHOPAEDIC SURGERY
Payer: OTHER GOVERNMENT

## 2024-11-01 DIAGNOSIS — Z96.651 STATUS POST REVISION OF TOTAL REPLACEMENT OF RIGHT KNEE: ICD-10-CM

## 2024-11-01 DIAGNOSIS — M25.561 CHRONIC KNEE PAIN AFTER TOTAL REPLACEMENT OF RIGHT KNEE JOINT: ICD-10-CM

## 2024-11-01 DIAGNOSIS — Z96.651 HISTORY OF REVISION OF TOTAL REPLACEMENT OF RIGHT KNEE JOINT: ICD-10-CM

## 2024-11-01 DIAGNOSIS — T84.022A INSTABILITY OF INTERNAL RIGHT KNEE PROSTHESIS, INITIAL ENCOUNTER: Primary | ICD-10-CM

## 2024-11-01 DIAGNOSIS — Z96.651 CHRONIC KNEE PAIN AFTER TOTAL REPLACEMENT OF RIGHT KNEE JOINT: ICD-10-CM

## 2024-11-01 DIAGNOSIS — G89.29 CHRONIC KNEE PAIN AFTER TOTAL REPLACEMENT OF RIGHT KNEE JOINT: ICD-10-CM

## 2024-11-01 PROBLEM — T84.023A: Status: ACTIVE | Noted: 2024-11-01

## 2024-11-01 LAB
ABO GROUP BLD: NORMAL
BLD GP AB SCN SERPL QL: NEGATIVE
RH BLD: POSITIVE
T&S EXPIRATION DATE: NORMAL

## 2024-11-01 PROCEDURE — C1776 JOINT DEVICE (IMPLANTABLE): HCPCS | Performed by: ORTHOPAEDIC SURGERY

## 2024-11-01 PROCEDURE — 25010000002 LIDOCAINE PF 2% 2 % SOLUTION: Performed by: NURSE ANESTHETIST, CERTIFIED REGISTERED

## 2024-11-01 PROCEDURE — 25010000002 FENTANYL CITRATE (PF) 50 MCG/ML SOLUTION: Performed by: STUDENT IN AN ORGANIZED HEALTH CARE EDUCATION/TRAINING PROGRAM

## 2024-11-01 PROCEDURE — 87176 TISSUE HOMOGENIZATION CULTR: CPT | Performed by: ORTHOPAEDIC SURGERY

## 2024-11-01 PROCEDURE — 25010000002 ALBUMIN HUMAN 5% PER 50 ML: Performed by: ANESTHESIOLOGY

## 2024-11-01 PROCEDURE — P9041 ALBUMIN (HUMAN),5%, 50ML: HCPCS | Performed by: ANESTHESIOLOGY

## 2024-11-01 PROCEDURE — 86901 BLOOD TYPING SEROLOGIC RH(D): CPT | Performed by: ORTHOPAEDIC SURGERY

## 2024-11-01 PROCEDURE — 25810000003 SODIUM CHLORIDE 0.9 % SOLUTION 250 ML FLEX CONT: Performed by: ORTHOPAEDIC SURGERY

## 2024-11-01 PROCEDURE — 86850 RBC ANTIBODY SCREEN: CPT | Performed by: ORTHOPAEDIC SURGERY

## 2024-11-01 PROCEDURE — 25010000002 ROPIVACAINE PER 1 MG: Performed by: STUDENT IN AN ORGANIZED HEALTH CARE EDUCATION/TRAINING PROGRAM

## 2024-11-01 PROCEDURE — 87205 SMEAR GRAM STAIN: CPT | Performed by: ORTHOPAEDIC SURGERY

## 2024-11-01 PROCEDURE — 25010000002 HYDROMORPHONE PER 4 MG: Performed by: ORTHOPAEDIC SURGERY

## 2024-11-01 PROCEDURE — 25010000002 PROPOFOL 200 MG/20ML EMULSION: Performed by: NURSE ANESTHETIST, CERTIFIED REGISTERED

## 2024-11-01 PROCEDURE — 86900 BLOOD TYPING SEROLOGIC ABO: CPT | Performed by: ORTHOPAEDIC SURGERY

## 2024-11-01 PROCEDURE — C9290 INJ, BUPIVACAINE LIPOSOME: HCPCS | Performed by: ORTHOPAEDIC SURGERY

## 2024-11-01 PROCEDURE — 0 BUPIVACAINE LIPOSOME 1.3 % SUSPENSION: Performed by: ORTHOPAEDIC SURGERY

## 2024-11-01 PROCEDURE — C1713 ANCHOR/SCREW BN/BN,TIS/BN: HCPCS | Performed by: ORTHOPAEDIC SURGERY

## 2024-11-01 PROCEDURE — 25010000002 ONDANSETRON PER 1 MG: Performed by: NURSE ANESTHETIST, CERTIFIED REGISTERED

## 2024-11-01 PROCEDURE — 25010000002 CEFAZOLIN PER 500 MG: Performed by: ORTHOPAEDIC SURGERY

## 2024-11-01 PROCEDURE — L1830 KO IMMOB CANVAS LONG PRE OTS: HCPCS | Performed by: ORTHOPAEDIC SURGERY

## 2024-11-01 PROCEDURE — 87070 CULTURE OTHR SPECIMN AEROBIC: CPT | Performed by: ORTHOPAEDIC SURGERY

## 2024-11-01 PROCEDURE — 25810000003 LACTATED RINGERS PER 1000 ML: Performed by: STUDENT IN AN ORGANIZED HEALTH CARE EDUCATION/TRAINING PROGRAM

## 2024-11-01 PROCEDURE — 25010000002 VANCOMYCIN HCL 1.25 G RECONSTITUTED SOLUTION 1 EACH VIAL: Performed by: ORTHOPAEDIC SURGERY

## 2024-11-01 PROCEDURE — 25010000002 FENTANYL CITRATE (PF) 50 MCG/ML SOLUTION: Performed by: NURSE ANESTHETIST, CERTIFIED REGISTERED

## 2024-11-01 PROCEDURE — 0SRC0J9 REPLACEMENT OF RIGHT KNEE JOINT WITH SYNTHETIC SUBSTITUTE, CEMENTED, OPEN APPROACH: ICD-10-PCS | Performed by: ORTHOPAEDIC SURGERY

## 2024-11-01 PROCEDURE — 73560 X-RAY EXAM OF KNEE 1 OR 2: CPT

## 2024-11-01 PROCEDURE — 25010000002 HYDROMORPHONE 1 MG/ML SOLUTION: Performed by: STUDENT IN AN ORGANIZED HEALTH CARE EDUCATION/TRAINING PROGRAM

## 2024-11-01 PROCEDURE — 25010000002 CEFAZOLIN PER 500 MG: Performed by: ANESTHESIOLOGY

## 2024-11-01 PROCEDURE — 87015 SPECIMEN INFECT AGNT CONCNTJ: CPT | Performed by: ORTHOPAEDIC SURGERY

## 2024-11-01 PROCEDURE — 25010000002 SUGAMMADEX 200 MG/2ML SOLUTION: Performed by: ANESTHESIOLOGY

## 2024-11-01 PROCEDURE — 25010000002 DEXAMETHASONE PER 1 MG: Performed by: STUDENT IN AN ORGANIZED HEALTH CARE EDUCATION/TRAINING PROGRAM

## 2024-11-01 PROCEDURE — 0SPC0JZ REMOVAL OF SYNTHETIC SUBSTITUTE FROM RIGHT KNEE JOINT, OPEN APPROACH: ICD-10-PCS | Performed by: ORTHOPAEDIC SURGERY

## 2024-11-01 PROCEDURE — 25010000002 HYDROMORPHONE PER 4 MG: Performed by: NURSE ANESTHETIST, CERTIFIED REGISTERED

## 2024-11-01 PROCEDURE — 25010000002 ACETAMINOPHEN 10 MG/ML SOLUTION: Performed by: NURSE ANESTHETIST, CERTIFIED REGISTERED

## 2024-11-01 PROCEDURE — 87075 CULTR BACTERIA EXCEPT BLOOD: CPT | Performed by: ORTHOPAEDIC SURGERY

## 2024-11-01 PROCEDURE — 25010000002 BUPIVACAINE 0.5 % SOLUTION: Performed by: ORTHOPAEDIC SURGERY

## 2024-11-01 PROCEDURE — 25010000002 MIDAZOLAM PER 1 MG: Performed by: STUDENT IN AN ORGANIZED HEALTH CARE EDUCATION/TRAINING PROGRAM

## 2024-11-01 PROCEDURE — 25810000003 SODIUM CHLORIDE 0.9 % SOLUTION: Performed by: ORTHOPAEDIC SURGERY

## 2024-11-01 DEVICE — TOTAL STABILIZER FEMORAL COMPONENT
Type: IMPLANTABLE DEVICE | Site: KNEE | Status: FUNCTIONAL
Brand: TRIATHLON

## 2024-11-01 DEVICE — TIBIAL BEARING INSERT - PS
Type: IMPLANTABLE DEVICE | Site: KNEE | Status: FUNCTIONAL
Brand: TRIATHLON

## 2024-11-01 DEVICE — TIBIAL AUGMENT HALF BLOCK
Type: IMPLANTABLE DEVICE | Site: KNEE | Status: FUNCTIONAL
Brand: TRIATHLON

## 2024-11-01 DEVICE — TRITANIUM CENTRAL FEMORAL CONE AUGMENT
Type: IMPLANTABLE DEVICE | Site: KNEE | Status: FUNCTIONAL
Brand: TRIATHLON

## 2024-11-01 DEVICE — CEMENTED STEM
Type: IMPLANTABLE DEVICE | Site: KNEE | Status: FUNCTIONAL
Brand: TRIATHLON

## 2024-11-01 DEVICE — TOBRA FULL DOSE ANTIBIOTIC BONE CEMENT, 10 PACK CATALOG NUMBER IS 6197-9-010
Type: IMPLANTABLE DEVICE | Site: KNEE | Status: FUNCTIONAL
Brand: SIMPLEX

## 2024-11-01 DEVICE — KNOTLESS TISSUE CONTROL DEVICE, UNDYED UNIDIRECTIONAL (ANTIBACTERIAL) SYNTHETIC ABSORBABLE DEVICE
Type: IMPLANTABLE DEVICE | Site: KNEE | Status: FUNCTIONAL
Brand: STRATAFIX

## 2024-11-01 DEVICE — FEMORAL DISTAL AUGMENT
Type: IMPLANTABLE DEVICE | Site: KNEE | Status: FUNCTIONAL
Brand: TRIATHLON

## 2024-11-01 DEVICE — FEMORAL POSTERIOR AUGMENT
Type: IMPLANTABLE DEVICE | Site: KNEE | Status: FUNCTIONAL
Brand: TRIATHLON

## 2024-11-01 DEVICE — TRITANIUM TIBIAL SYMMETRIC CONE AUGMENT
Type: IMPLANTABLE DEVICE | Site: KNEE | Status: FUNCTIONAL
Brand: TRIATHLON

## 2024-11-01 DEVICE — VIOLET ANTIBACTERIAL POLYDIOXANONE, KNOTLESS TISSUE CONTROL DEVICE
Type: IMPLANTABLE DEVICE | Site: KNEE | Status: FUNCTIONAL
Brand: STRATAFIX

## 2024-11-01 DEVICE — FLUTED STEM
Type: IMPLANTABLE DEVICE | Site: KNEE | Status: FUNCTIONAL
Brand: TRIATHLON

## 2024-11-01 DEVICE — UNIVERSAL TIBIAL BASEPLATE
Type: IMPLANTABLE DEVICE | Site: KNEE | Status: FUNCTIONAL
Brand: TRIATHLON

## 2024-11-01 RX ORDER — PHENYLEPHRINE HCL IN 0.9% NACL 1 MG/10 ML
SYRINGE (ML) INTRAVENOUS AS NEEDED
Status: DISCONTINUED | OUTPATIENT
Start: 2024-11-01 | End: 2024-11-01 | Stop reason: SURG

## 2024-11-01 RX ORDER — IPRATROPIUM BROMIDE AND ALBUTEROL SULFATE 2.5; .5 MG/3ML; MG/3ML
3 SOLUTION RESPIRATORY (INHALATION) ONCE AS NEEDED
Status: DISCONTINUED | OUTPATIENT
Start: 2024-11-01 | End: 2024-11-01 | Stop reason: HOSPADM

## 2024-11-01 RX ORDER — LABETALOL HYDROCHLORIDE 5 MG/ML
5 INJECTION, SOLUTION INTRAVENOUS
Status: DISCONTINUED | OUTPATIENT
Start: 2024-11-01 | End: 2024-11-01 | Stop reason: HOSPADM

## 2024-11-01 RX ORDER — SODIUM CHLORIDE 0.9 % (FLUSH) 0.9 %
3-10 SYRINGE (ML) INJECTION AS NEEDED
Status: DISCONTINUED | OUTPATIENT
Start: 2024-11-01 | End: 2024-11-01 | Stop reason: HOSPADM

## 2024-11-01 RX ORDER — DOCUSATE SODIUM 100 MG/1
100 CAPSULE, LIQUID FILLED ORAL 2 TIMES DAILY
Status: DISCONTINUED | OUTPATIENT
Start: 2024-11-01 | End: 2024-11-01 | Stop reason: SDUPTHER

## 2024-11-01 RX ORDER — OXYCODONE AND ACETAMINOPHEN 7.5; 325 MG/1; MG/1
1 TABLET ORAL EVERY 4 HOURS PRN
Status: DISCONTINUED | OUTPATIENT
Start: 2024-11-01 | End: 2024-11-01 | Stop reason: HOSPADM

## 2024-11-01 RX ORDER — ROCURONIUM BROMIDE 10 MG/ML
INJECTION, SOLUTION INTRAVENOUS AS NEEDED
Status: DISCONTINUED | OUTPATIENT
Start: 2024-11-01 | End: 2024-11-01 | Stop reason: SURG

## 2024-11-01 RX ORDER — DIPHENHYDRAMINE HYDROCHLORIDE 50 MG/ML
12.5 INJECTION INTRAMUSCULAR; INTRAVENOUS
Status: DISCONTINUED | OUTPATIENT
Start: 2024-11-01 | End: 2024-11-01 | Stop reason: HOSPADM

## 2024-11-01 RX ORDER — ONDANSETRON 2 MG/ML
4 INJECTION INTRAMUSCULAR; INTRAVENOUS ONCE AS NEEDED
Status: DISCONTINUED | OUTPATIENT
Start: 2024-11-01 | End: 2024-11-01 | Stop reason: HOSPADM

## 2024-11-01 RX ORDER — DOCUSATE SODIUM 100 MG/1
100 CAPSULE, LIQUID FILLED ORAL 2 TIMES DAILY
Status: DISCONTINUED | OUTPATIENT
Start: 2024-11-01 | End: 2024-11-02 | Stop reason: HOSPADM

## 2024-11-01 RX ORDER — DROPERIDOL 2.5 MG/ML
0.62 INJECTION, SOLUTION INTRAMUSCULAR; INTRAVENOUS
Status: DISCONTINUED | OUTPATIENT
Start: 2024-11-01 | End: 2024-11-01 | Stop reason: HOSPADM

## 2024-11-01 RX ORDER — AMLODIPINE BESYLATE 5 MG/1
2.5 TABLET ORAL NIGHTLY
Status: DISCONTINUED | OUTPATIENT
Start: 2024-11-01 | End: 2024-11-02 | Stop reason: HOSPADM

## 2024-11-01 RX ORDER — ONDANSETRON 4 MG/1
4 TABLET, ORALLY DISINTEGRATING ORAL EVERY 6 HOURS PRN
Status: DISCONTINUED | OUTPATIENT
Start: 2024-11-01 | End: 2024-11-02 | Stop reason: HOSPADM

## 2024-11-01 RX ORDER — NALOXONE HCL 0.4 MG/ML
0.1 VIAL (ML) INJECTION
Status: DISCONTINUED | OUTPATIENT
Start: 2024-11-01 | End: 2024-11-02 | Stop reason: HOSPADM

## 2024-11-01 RX ORDER — TRANEXAMIC ACID 100 MG/ML
INJECTION, SOLUTION INTRAVENOUS AS NEEDED
Status: DISCONTINUED | OUTPATIENT
Start: 2024-11-01 | End: 2024-11-01 | Stop reason: SURG

## 2024-11-01 RX ORDER — FENTANYL CITRATE 50 UG/ML
50 INJECTION, SOLUTION INTRAMUSCULAR; INTRAVENOUS ONCE AS NEEDED
Status: COMPLETED | OUTPATIENT
Start: 2024-11-01 | End: 2024-11-01

## 2024-11-01 RX ORDER — HYDROCODONE BITARTRATE AND ACETAMINOPHEN 7.5; 325 MG/1; MG/1
1 TABLET ORAL EVERY 4 HOURS PRN
Status: DISCONTINUED | OUTPATIENT
Start: 2024-11-01 | End: 2024-11-02 | Stop reason: HOSPADM

## 2024-11-01 RX ORDER — OXYCODONE HCL 10 MG/1
20 TABLET, FILM COATED, EXTENDED RELEASE ORAL ONCE
Status: COMPLETED | OUTPATIENT
Start: 2024-11-01 | End: 2024-11-01

## 2024-11-01 RX ORDER — PROMETHAZINE HYDROCHLORIDE 25 MG/1
25 TABLET ORAL ONCE AS NEEDED
Status: DISCONTINUED | OUTPATIENT
Start: 2024-11-01 | End: 2024-11-01 | Stop reason: HOSPADM

## 2024-11-01 RX ORDER — BISACODYL 5 MG/1
10 TABLET, DELAYED RELEASE ORAL DAILY PRN
Status: DISCONTINUED | OUTPATIENT
Start: 2024-11-02 | End: 2024-11-02 | Stop reason: HOSPADM

## 2024-11-01 RX ORDER — ONDANSETRON 2 MG/ML
4 INJECTION INTRAMUSCULAR; INTRAVENOUS EVERY 6 HOURS PRN
Status: DISCONTINUED | OUTPATIENT
Start: 2024-11-01 | End: 2024-11-02 | Stop reason: HOSPADM

## 2024-11-01 RX ORDER — ONDANSETRON 2 MG/ML
INJECTION INTRAMUSCULAR; INTRAVENOUS AS NEEDED
Status: DISCONTINUED | OUTPATIENT
Start: 2024-11-01 | End: 2024-11-01 | Stop reason: SURG

## 2024-11-01 RX ORDER — HYDROCODONE BITARTRATE AND ACETAMINOPHEN 7.5; 325 MG/1; MG/1
2 TABLET ORAL EVERY 4 HOURS PRN
Status: DISCONTINUED | OUTPATIENT
Start: 2024-11-01 | End: 2024-11-02 | Stop reason: HOSPADM

## 2024-11-01 RX ORDER — ROPIVACAINE HYDROCHLORIDE 5 MG/ML
INJECTION, SOLUTION EPIDURAL; INFILTRATION; PERINEURAL
Status: COMPLETED | OUTPATIENT
Start: 2024-11-01 | End: 2024-11-01

## 2024-11-01 RX ORDER — SODIUM CHLORIDE 0.9 % (FLUSH) 0.9 %
3 SYRINGE (ML) INJECTION EVERY 12 HOURS SCHEDULED
Status: DISCONTINUED | OUTPATIENT
Start: 2024-11-01 | End: 2024-11-01 | Stop reason: HOSPADM

## 2024-11-01 RX ORDER — PROMETHAZINE HYDROCHLORIDE 25 MG/1
25 SUPPOSITORY RECTAL ONCE AS NEEDED
Status: DISCONTINUED | OUTPATIENT
Start: 2024-11-01 | End: 2024-11-01 | Stop reason: HOSPADM

## 2024-11-01 RX ORDER — FENTANYL CITRATE 50 UG/ML
50 INJECTION, SOLUTION INTRAMUSCULAR; INTRAVENOUS
Status: DISCONTINUED | OUTPATIENT
Start: 2024-11-01 | End: 2024-11-01 | Stop reason: HOSPADM

## 2024-11-01 RX ORDER — PREGABALIN 75 MG/1
75 CAPSULE ORAL ONCE
Status: COMPLETED | OUTPATIENT
Start: 2024-11-01 | End: 2024-11-01

## 2024-11-01 RX ORDER — CHLORHEXIDINE GLUCONATE 500 MG/1
CLOTH TOPICAL ONCE
Status: COMPLETED | OUTPATIENT
Start: 2024-11-01 | End: 2024-11-01

## 2024-11-01 RX ORDER — HYDROCODONE BITARTRATE AND ACETAMINOPHEN 5; 325 MG/1; MG/1
1 TABLET ORAL ONCE AS NEEDED
Status: DISCONTINUED | OUTPATIENT
Start: 2024-11-01 | End: 2024-11-01 | Stop reason: HOSPADM

## 2024-11-01 RX ORDER — HYDROCHLOROTHIAZIDE 25 MG/1
25 TABLET ORAL DAILY
Status: DISCONTINUED | OUTPATIENT
Start: 2024-11-01 | End: 2024-11-02 | Stop reason: HOSPADM

## 2024-11-01 RX ORDER — ASPIRIN 81 MG/1
81 TABLET ORAL EVERY 12 HOURS SCHEDULED
Status: DISCONTINUED | OUTPATIENT
Start: 2024-11-01 | End: 2024-11-02 | Stop reason: HOSPADM

## 2024-11-01 RX ORDER — BUPIVACAINE HYDROCHLORIDE 5 MG/ML
INJECTION, SOLUTION PERINEURAL AS NEEDED
Status: DISCONTINUED | OUTPATIENT
Start: 2024-11-01 | End: 2024-11-01 | Stop reason: HOSPADM

## 2024-11-01 RX ORDER — HYDROMORPHONE HYDROCHLORIDE 1 MG/ML
0.5 INJECTION, SOLUTION INTRAMUSCULAR; INTRAVENOUS; SUBCUTANEOUS
Status: DISCONTINUED | OUTPATIENT
Start: 2024-11-01 | End: 2024-11-01 | Stop reason: HOSPADM

## 2024-11-01 RX ORDER — MIDAZOLAM HYDROCHLORIDE 1 MG/ML
1 INJECTION, SOLUTION INTRAMUSCULAR; INTRAVENOUS
Status: COMPLETED | OUTPATIENT
Start: 2024-11-01 | End: 2024-11-01

## 2024-11-01 RX ORDER — SODIUM CHLORIDE, SODIUM LACTATE, POTASSIUM CHLORIDE, CALCIUM CHLORIDE 600; 310; 30; 20 MG/100ML; MG/100ML; MG/100ML; MG/100ML
9 INJECTION, SOLUTION INTRAVENOUS CONTINUOUS
Status: DISCONTINUED | OUTPATIENT
Start: 2024-11-01 | End: 2024-11-01

## 2024-11-01 RX ORDER — LIDOCAINE HYDROCHLORIDE 10 MG/ML
0.5 INJECTION, SOLUTION INFILTRATION; PERINEURAL ONCE AS NEEDED
Status: DISCONTINUED | OUTPATIENT
Start: 2024-11-01 | End: 2024-11-01 | Stop reason: HOSPADM

## 2024-11-01 RX ORDER — HYDRALAZINE HYDROCHLORIDE 20 MG/ML
5 INJECTION INTRAMUSCULAR; INTRAVENOUS
Status: DISCONTINUED | OUTPATIENT
Start: 2024-11-01 | End: 2024-11-01 | Stop reason: HOSPADM

## 2024-11-01 RX ORDER — ALBUMIN, HUMAN INJ 5% 5 %
SOLUTION INTRAVENOUS CONTINUOUS PRN
Status: DISCONTINUED | OUTPATIENT
Start: 2024-11-01 | End: 2024-11-01 | Stop reason: SURG

## 2024-11-01 RX ORDER — FLUMAZENIL 0.1 MG/ML
0.2 INJECTION INTRAVENOUS AS NEEDED
Status: DISCONTINUED | OUTPATIENT
Start: 2024-11-01 | End: 2024-11-01 | Stop reason: HOSPADM

## 2024-11-01 RX ORDER — NALOXONE HCL 0.4 MG/ML
0.2 VIAL (ML) INJECTION AS NEEDED
Status: DISCONTINUED | OUTPATIENT
Start: 2024-11-01 | End: 2024-11-01 | Stop reason: HOSPADM

## 2024-11-01 RX ORDER — HYDROMORPHONE HYDROCHLORIDE 1 MG/ML
0.5 INJECTION, SOLUTION INTRAMUSCULAR; INTRAVENOUS; SUBCUTANEOUS
Status: DISCONTINUED | OUTPATIENT
Start: 2024-11-01 | End: 2024-11-02 | Stop reason: HOSPADM

## 2024-11-01 RX ORDER — CEFAZOLIN SODIUM 500 MG/2.2ML
INJECTION, POWDER, FOR SOLUTION INTRAMUSCULAR; INTRAVENOUS AS NEEDED
Status: DISCONTINUED | OUTPATIENT
Start: 2024-11-01 | End: 2024-11-01 | Stop reason: SURG

## 2024-11-01 RX ORDER — ACETAMINOPHEN 10 MG/ML
INJECTION, SOLUTION INTRAVENOUS AS NEEDED
Status: DISCONTINUED | OUTPATIENT
Start: 2024-11-01 | End: 2024-11-01 | Stop reason: SURG

## 2024-11-01 RX ORDER — EPHEDRINE SULFATE 50 MG/ML
5 INJECTION, SOLUTION INTRAVENOUS ONCE AS NEEDED
Status: DISCONTINUED | OUTPATIENT
Start: 2024-11-01 | End: 2024-11-01 | Stop reason: HOSPADM

## 2024-11-01 RX ORDER — DEXAMETHASONE SODIUM PHOSPHATE 4 MG/ML
INJECTION, SOLUTION INTRA-ARTICULAR; INTRALESIONAL; INTRAMUSCULAR; INTRAVENOUS; SOFT TISSUE
Status: COMPLETED | OUTPATIENT
Start: 2024-11-01 | End: 2024-11-01

## 2024-11-01 RX ORDER — PROPOFOL 10 MG/ML
INJECTION, EMULSION INTRAVENOUS AS NEEDED
Status: DISCONTINUED | OUTPATIENT
Start: 2024-11-01 | End: 2024-11-01 | Stop reason: SURG

## 2024-11-01 RX ORDER — SODIUM CHLORIDE 9 MG/ML
100 INJECTION, SOLUTION INTRAVENOUS CONTINUOUS
Status: DISCONTINUED | OUTPATIENT
Start: 2024-11-01 | End: 2024-11-02 | Stop reason: HOSPADM

## 2024-11-01 RX ORDER — LIDOCAINE HYDROCHLORIDE 20 MG/ML
INJECTION, SOLUTION EPIDURAL; INFILTRATION; INTRACAUDAL; PERINEURAL AS NEEDED
Status: DISCONTINUED | OUTPATIENT
Start: 2024-11-01 | End: 2024-11-01 | Stop reason: SURG

## 2024-11-01 RX ORDER — FENTANYL CITRATE 50 UG/ML
INJECTION, SOLUTION INTRAMUSCULAR; INTRAVENOUS AS NEEDED
Status: DISCONTINUED | OUTPATIENT
Start: 2024-11-01 | End: 2024-11-01 | Stop reason: SURG

## 2024-11-01 RX ADMIN — ROCURONIUM BROMIDE 50 MG: 10 INJECTION, SOLUTION INTRAVENOUS at 13:49

## 2024-11-01 RX ADMIN — ACETAMINOPHEN 1000 MG: 1000 INJECTION INTRAVENOUS at 13:56

## 2024-11-01 RX ADMIN — ROCURONIUM BROMIDE 10 MG: 10 INJECTION, SOLUTION INTRAVENOUS at 15:46

## 2024-11-01 RX ADMIN — FENTANYL CITRATE 50 MCG: 50 INJECTION INTRAMUSCULAR; INTRAVENOUS at 18:51

## 2024-11-01 RX ADMIN — SODIUM CHLORIDE 2 G: 900 INJECTION INTRAVENOUS at 13:36

## 2024-11-01 RX ADMIN — AMLODIPINE BESYLATE 2.5 MG: 5 TABLET ORAL at 22:51

## 2024-11-01 RX ADMIN — Medication 100 MCG: at 17:17

## 2024-11-01 RX ADMIN — FENTANYL CITRATE 50 MCG: 50 INJECTION, SOLUTION INTRAMUSCULAR; INTRAVENOUS at 13:49

## 2024-11-01 RX ADMIN — ALBUMIN (HUMAN): 12.5 INJECTION, SOLUTION INTRAVENOUS at 16:15

## 2024-11-01 RX ADMIN — VANCOMYCIN HYDROCHLORIDE 1250 MG: 1.25 INJECTION, POWDER, LYOPHILIZED, FOR SOLUTION INTRAVENOUS at 12:14

## 2024-11-01 RX ADMIN — OXYCODONE HYDROCHLORIDE AND ACETAMINOPHEN 1 TABLET: 7.5; 325 TABLET ORAL at 18:35

## 2024-11-01 RX ADMIN — SUGAMMADEX 200 MG: 100 INJECTION, SOLUTION INTRAVENOUS at 17:27

## 2024-11-01 RX ADMIN — ROPIVACAINE HYDROCHLORIDE 15 ML: 5 INJECTION EPIDURAL; INFILTRATION; PERINEURAL at 12:11

## 2024-11-01 RX ADMIN — DEXAMETHASONE SODIUM PHOSPHATE 8 MG: 4 INJECTION, SOLUTION INTRA-ARTICULAR; INTRALESIONAL; INTRAMUSCULAR; INTRAVENOUS; SOFT TISSUE at 14:06

## 2024-11-01 RX ADMIN — DEXAMETHASONE SODIUM PHOSPHATE 4 MG: 4 INJECTION, SOLUTION INTRA-ARTICULAR; INTRALESIONAL; INTRAMUSCULAR; INTRAVENOUS; SOFT TISSUE at 12:11

## 2024-11-01 RX ADMIN — SODIUM CHLORIDE, POTASSIUM CHLORIDE, SODIUM LACTATE AND CALCIUM CHLORIDE 9 ML/HR: 600; 310; 30; 20 INJECTION, SOLUTION INTRAVENOUS at 11:29

## 2024-11-01 RX ADMIN — CEFAZOLIN 2 G: 225 INJECTION, POWDER, FOR SOLUTION INTRAMUSCULAR; INTRAVENOUS at 17:31

## 2024-11-01 RX ADMIN — HYDROMORPHONE HYDROCHLORIDE 0.5 MG: 1 INJECTION, SOLUTION INTRAMUSCULAR; INTRAVENOUS; SUBCUTANEOUS at 14:32

## 2024-11-01 RX ADMIN — HYDROCODONE BITARTRATE AND ACETAMINOPHEN 2 TABLET: 7.5; 325 TABLET ORAL at 22:51

## 2024-11-01 RX ADMIN — PROPOFOL 200 MG: 10 INJECTION, EMULSION INTRAVENOUS at 13:49

## 2024-11-01 RX ADMIN — PREGABALIN 75 MG: 75 CAPSULE ORAL at 11:21

## 2024-11-01 RX ADMIN — TRANEXAMIC ACID 1000 MG: 100 INJECTION, SOLUTION INTRAVENOUS at 14:04

## 2024-11-01 RX ADMIN — SODIUM CHLORIDE 2000 MG: 900 INJECTION INTRAVENOUS at 21:05

## 2024-11-01 RX ADMIN — MIDAZOLAM 1 MG: 1 INJECTION INTRAMUSCULAR; INTRAVENOUS at 12:10

## 2024-11-01 RX ADMIN — LIDOCAINE HYDROCHLORIDE 60 MG: 20 INJECTION, SOLUTION EPIDURAL; INFILTRATION; INTRACAUDAL; PERINEURAL at 13:49

## 2024-11-01 RX ADMIN — ASPIRIN 81 MG: 81 TABLET, COATED ORAL at 22:51

## 2024-11-01 RX ADMIN — HYDROMORPHONE HYDROCHLORIDE 0.5 MG: 1 INJECTION, SOLUTION INTRAMUSCULAR; INTRAVENOUS; SUBCUTANEOUS at 18:05

## 2024-11-01 RX ADMIN — SODIUM CHLORIDE 100 ML/HR: 9 INJECTION, SOLUTION INTRAVENOUS at 21:03

## 2024-11-01 RX ADMIN — CHLORHEXIDINE GLUCONATE: 500 CLOTH TOPICAL at 11:22

## 2024-11-01 RX ADMIN — HYDROMORPHONE HYDROCHLORIDE 0.5 MG: 1 INJECTION, SOLUTION INTRAMUSCULAR; INTRAVENOUS; SUBCUTANEOUS at 21:24

## 2024-11-01 RX ADMIN — OXYCODONE HYDROCHLORIDE 20 MG: 10 TABLET, FILM COATED, EXTENDED RELEASE ORAL at 11:21

## 2024-11-01 RX ADMIN — ONDANSETRON 4 MG: 2 INJECTION INTRAMUSCULAR; INTRAVENOUS at 14:06

## 2024-11-01 RX ADMIN — MIDAZOLAM 1 MG: 1 INJECTION INTRAMUSCULAR; INTRAVENOUS at 12:05

## 2024-11-01 RX ADMIN — FENTANYL CITRATE 50 MCG: 50 INJECTION, SOLUTION INTRAMUSCULAR; INTRAVENOUS at 12:05

## 2024-11-01 RX ADMIN — Medication 3 ML: at 13:36

## 2024-11-01 NOTE — OP NOTE
ORTHOPAEDIC OPERATIVE NOTE    Patient: Funmilayo Concepcion       YOB: 1970    Medical Record Number: [id number]    Attending Physician: Nancy vEans,*    Primary Care Physician: Provider, No Known    Date of Service: 11/1/2024    Surgeon: Nancy Evans MD        DATE OF PROCEDURE: 11/1/2024    PREOPERATIVE DIAGNOSIS: Instability of internal right knee prosthesis, initial encounter [T84.022A]    1. Failed total knee arthroplasty RIGHT knee - Instability right knee .  2. Chronic pain right knee      POSTOPERATIVE DIAGNOSIS: Instability of internal right knee prosthesis, initial encounter [T84.022A]    1. Failed total knee arthroplasty RIGHT knee - Instability right knee .  2. Chronic pain right knee     PROCEDURE PERFORMED: Procedure(s):  TOTAL KNEE ARTHROPLASTY REVISION  1. Revision RIGHT total knee arthroplasty by utilizing the Guang Lian Shi Dai revision system with a     Surgical Approach: Knee Medial Parapatellar     Tibia  Universal  baseplate size 3 -   Tibia  Augments 5 mm  Medial and lateral - not implanted  Tibia Cone Symmetric Size A ,   Cemented stem 12 mm x 50 mm    Femur    The total stabilizer component size 3,   Distal femur augments 5 mm thickness  Medial and 10 mm lateral ,  Femur Cone augment Size 3-4  fluted stem 16 mm ×100 mm length for the femur.    Posterior stabilizer tibial insert size # 3, 14 mm thickness.    Bone cement  was utilized for additional stabilization of the implants.    Implant Name Type Inv. Item Serial No.  Lot No. LRB No. Used Action   DEV CONTRL TISS STRATAFIX PDS PLS OS6 REV SZ1 18IN 45CM - [id number] Implant DEV CONTRL TISS STRATAFIX PDS PLS OS6 REV SZ1 18IN 45CM  ETHICON  DIV OF J AND J [id number] Left 1 Implanted   DEV CONTRL TISS STRATAFIXSPIRALMNCRYL PLSPS2 REV3/0 45CM - [id number] Implant DEV CONTRL TISS STRATAFIXSPIRALMNCRYL PLSPS2 REV3/0 45CM  ETHICON  DIV OF J AND J [id number] Left 1 Implanted    DEV CONTRL TISS STRATAFIX PDS PLS OS6 REV SZ1 18IN 45CM - [id number] Implant DEV CONTRL TISS STRATAFIX PDS PLS OS6 REV SZ1 18IN 45CM  ETHICON  DIV OF J AND J [id number] Left 1 Implanted   CMT BONE SIMPLEX/P TMYCIN FDOS 10PK - [id number] Implant CMT BONE SIMPLEX/P TMYCIN FDOS 10PK  MADELEINE BEN [id number] Left 4 Implanted   AUG FEM/KN TRIATHLON TOTLSTBL DIST SZ3 5MM RT - [id number] Implant AUG FEM/KN TRIATHLON TOTLSTBL DIST SZ3 5MM RT  MADELEINE BEN [id number] Left 1 Implanted   AUG FEM/KN TRIATHLON REV POST SZ3 5MM - [id number] Implant AUG FEM/KN TRIATHLON REV POST SZ3 5MM  MADELEINE BEN [id number] Left 1 Implanted   AUG FEM/KN TRIATHLON TOTLSTBL DIST SZ3 10MM RT - [id number] Implant AUG FEM/KN TRIATHLON TOTLSTBL DIST SZ3 10MM RT  MADELEINE BEN [id number] Left 1 Implanted   COMP FEM TRIATH TS SZ3 RT - [id number] Implant COMP FEM TRIATH TS SZ3 RT  MADELEINE BEN [id number] Left 1 Implanted   AUG TIB/KN TRIATH 1/2BLCK RL/LM SZ3 5MM - [id number] Implant AUG TIB/KN TRIATH 1/2BLCK RL/LM SZ3 5MM  MADELEINE BEN [id number] Left 1 Implanted   STEM CMTLS FEM/KN TRIATHLON TS W/ENDCAP [id number] - [id number] Implant STEM CMTLS FEM/KN TRIATHLON TS W/ENDCAP [id number]  MADELEINE BEN [id number] Left 1 Implanted   STEM FEM TRIATH CMT 38C40HE - [id number] Implant STEM FEM TRIATH CMT [id number]  MADELEINE BEN [id number] Left 1 Implanted   AUG CONE TIB/KN TRIATHLON REV SYMM SZA - [id number] Implant AUG CONE TIB/KN TRIATHLON REV SYMM SZA  Netasq [id number] Left 1 Implanted   BASEPLT TIB TRIATH TS NO3 - [id number] Implant BASEPLT TIB TRIATH TS NO3  Netasq [id number] Left 1 Implanted   AUG TIB/KN TRIATH 1/2BLCK LL/RM SZ3 5MM - [id number] Implant AUG TIB/KN TRIATH 1/2BLCK LL/RM SZ3 5MM  Netasq [id number] Left 1 Implanted   CONE AUG FEM/KN TRIATHLON HY6ODD4 RT - [id number] Implant CONE AUG FEM/KN  TRIATHLON HL7FIX4 RT  [employer] [id number] Left 1 Implanted   INSRT TIB TRIATHLON CR X3 NO3 14MM - [id number] Implant INSRT TIB TRIATHLON CR X3 NO3 14MM  MADELEINE BEN [id number] Right 1 Implanted       SURGEON: Nancy Evans MD     ASSISTANT: Zaki Hung MD, Fellow    Matthew Coreas PA-C      The services of a skilled  first assist were necessary for performing the procedure safely and expeditiously.  The first assist was present for the entire duration of the case and helped with positioning, retraction and closure of the incision.      There was no qualified residents available to assist with the surgery.    ANESTHESIA: General anaesthesia with a regional block femoral-sciatic and intraoperative periarticular  Exparel injection.    ESTIMATED BLOOD LOSS: 100ml    SPECIMENS: 1.Tissue from  knee for culture sensitivity aerobic and anaerobic.      COMPLICATIONS: Nil.     DRAINS: A 10 Turkish Hemovac drain.     INDICATIONS: The patient presents today for an evaluation of her right knee.  She comes in today for evaluation and follow-up.  She was previously seen in the office.  She has previous history of right total knee arthroplasty with [facility]  She has had continued pain within the knee.  On the medial side.  She was seen in the office.  She was sent for a three-phase bone scan.  As well as serological studies.  She comes in today for review of these.     Erythrocyte sedimentation rate was normal at 23 on a 0-29 scale.  C-reactive protein was normal.  This was performed on September 17, 2024.  Three-phase bone scan was performed.     She denies any history of MRSA.  No history of DVT.  No significant cardiac issues.    I did have an extensive discussion with the patient regards her situation in the office today.  I have reviewed the above.  Patient was seen and examined by Dr. Evans.  Three-phase bone scan as well as previous x-rays  have been reviewed along with the serological studies.  No concerns for infectious etiology.  Current concerns for mechanical loosening of both the femoral and the tibial component given the osteolysis around knees.  She also has a little bit of instability.  Discussed with her treatment options.  She wishes to proceed with right total knee revision arthroplasty.  Risk, benefits, and outcomes were discussed.  She understands the risks.  She wishes to proceed with scheduling of right total knee revision arthroplasty.     Options and alternatives were discussed in detail with the patient.     The patient has reached a point of disability and has failed nonoperative management.       The patient is indicated for a right total knee revision arthroplasty      Likely,  Risks and benefits of the procedure including but not limited to infection, DVT, pulmonary embolism, future loosening of the implants, possibility of injury to nerves vessels and tendons, periprosthetic fractures have been discussed in detail.  Despite the risks involved,  The patient would like to proceed.       The patient  is being scheduled for a right total knee revision arthroplasty on December 5, 2024 at [facility]     I will request for Medical and cardiac clearance from Radha Lang MD      Postoperative DVT prophylaxis - Patient has no high risk factors  Plan for Aspirin .      Preoperative antibiotic prophylaxis - Plan for SCIP protocol with Cephazolin weight based.         Surgery will be scheduled for postoperative observation     Treatment options and alternatives were discussed in detail with the patient who is indicated for a revision total knee arthroplasty.     Likely risks and benefits of the procedure, including but not limited to infection, DVT, pulmonary embolism, future loosening of the implants, possibility of injury to tendons, ligaments, nerves or vessels and periprosthetic fractures, loss of extensor mechanism,  stiffness, future above-the-knee amputation have been discussed in detail. Despite the risks involved, the patient elected to proceed and informed consent was obtained and the patient was scheduled for surgery. The patient was seen in the preoperative holding area and the operative site was marked.       DESCRIPTION OF PROCEDURE:   The patient was transferred to [facility] operating room.     Preoperative antibiotics in the form of  Vancomycin and  Kefzol  intravenously was infused prior to the incision and prior to the tourniquet placement according to the SCIP protocol.     A surgical time out was done with the team and the correct patient, surgical side and site were identified.     After achieving adequate general anesthesia, a well-padded tourniquet was placed over the proximal aspect of the operative thigh. The operative leg was prepped and draped in the usual sterile fashion. Tourniquet was elevated to a pressure of 250 mm Hg.     Tranexamic acid was given intravenously prior to incision.      A skin incision was made vertically oriented centering over the patella anteriorly incorporating previous surgical scar. Skin and subcutaneous tissue were incised, full thickness flaps were raised and a medial parapatellar approach was developed. There was a small effusion. The patella was subluxed and the knee was inspected.      The femur, tibia and the patella implants were found to be well-fixed to the underlying bone.  This was a Biomet Vanguard implant.  The polyethylene liner was removed.  This was 12 millimeter thick, and anterior stabilized.  Tissue was sent for culture sensitivity.  The femoral implant was found to be cemented.  This was removed without any bone loss.  There was cement under the anterior flange of the femoral implant.  The tibial implant was press-fit Vanguard knee.  This was also removed without any bone loss.    As the knee tissue was looking very benign  and as the   inflammatory markers were negative it was planned to proceed with reimplantation.  The knee joint was thoroughly irrigated with saline.  All traces of previous cement was removed.  Debridement was completed.    Attention was then directed to the proximal tibia.  Intramedullary reamer was utilized progressively.  A   a conical proximal reamer was seated for adequate depth for a cone.  A trial cone was seated to achieve adequate metaphyseal stability.  A 5 mm cut was done with an oscillating saw along the proximal end of the tibia to account for the posterior slope.  The proximal tibial cut was found to be satisfactory. Trial baseplate was sized.  It was planned at the utilize 5 mm tibial augments to  Maintain joint line.    Attention was  directed to the distal femur.  Intramedullary reamer was utilized for the medullary canal followed by a  Reamer for the femur cone .  Adequate stability was found with a metaphyseal  Size 3 cone.  4-in-1 cutting block was utilized followed by cutting the box cut for the TS component.  The cuts were completed maintaining correct rotational alignment based upon epicondylar axis.      Trial liner was seated and knee reduced.  Reduction was found to be satisfactory with range of motion from 0° to 120° with the patella tracking well.     The patella was tracking well and was well-fixed to the underlying bone.  It was planned to leave it alone.    Having been satisfied with the trials, the bony surfaces irrigated with saline.  I have elected to proceed with press-fit cones. Exparel was infiltrated into the posterior capsule medially and laterally and into periarticular tissue and subcutaneously. The tibia and femur were seated appropriately. Followed by this, the tibial component was seated into position followed by the femoral component, followed by seating of the 14 mm thick trial liner.  I elected not to use the tibial augment.  The implants were assembled on the back table.  I used  Simplex cement for obtaining some support of the component to the bone secondary to bone loss.  I used metaphyseal cementation technique. The cement was allowed to set excess cement was removed.  The trial liner was replaced with a 14  mm posterior stabilized liner.     Again, range of motion was checked and the range was satisfactory from 0° to 120° with excellent stability throughout the range of motion. Good medial and lateral tissue tension, and soft tissue balancing. The patella was tracking well. Having been satisfied with this, the joint was thoroughly irrigated with saline. Soft tissue hemostasis was secured. A 10-Azeri Hemovac drain was placed.      The sponge and needle count was found to be correct.  Arthrotomy was closed with Ethibond sutures followed by closure of the incision in layers with Vicryl sutures and staples. Sterile dressings were placed and the patient was transferred to the recovery room in stable condition. The patient was given a knee immobilizer. The patient tolerated the procedure well and is being admitted for postoperative antibiotics according to the SCIP protocol for 2 more doses /  until I see culture result.     DVT Prophylaxis -  Mechanical - TEDS and venous foot plexipulses and aspirin 81 mg 2 times daily will be started daily on postoperative day #1.     The patient will be mobilized in am with physical therapy. WBAT and no restrictions for ROM.    I discussed the satisfactory performance of the procedure with the patient's family and discussed with them The postoperative management.      Nancy Evans M.D.    11/1/2024    CC: Provider, No Known; MD Nathan Gutiérrez, Nancy JONAS,*

## 2024-11-01 NOTE — ANESTHESIA PROCEDURE NOTES
Airway  Urgency: elective    Date/Time: 11/1/2024 1:52 PM  Airway not difficult    General Information and Staff    Patient location during procedure: OR  CRNA/CAA: Vania Concepcion CRNA    Indications and Patient Condition  Indications for airway management: airway protection    Preoxygenated: yes  Mask difficulty assessment: 1 - vent by mask    Final Airway Details  Final airway type: endotracheal airway      Successful airway: ETT  Cuffed: yes   Successful intubation technique: direct laryngoscopy  Endotracheal tube insertion site: oral  Blade: Marga  Blade size: 3  ETT size (mm): 7.0  Cormack-Lehane Classification: grade IIb - view of arytenoids or posterior of glottis only  Placement verified by: chest auscultation and capnometry   Cuff volume (mL): 6  Measured from: lips  ETT/EBT  to lips (cm): 20  Number of attempts at approach: 1  Assessment: lips, teeth, and gum same as pre-op and atraumatic intubation    Additional Comments  Smooth IV induction. Trachea intubated. Cuff up. Ett secured. BEBS. Dentition intact without injury.

## 2024-11-01 NOTE — ANESTHESIA POSTPROCEDURE EVALUATION
Patient: Funmilayo Concepcion    Procedure Summary       Date: 11/01/24 Room / Location:  [facility] [address] [place] [address] [facility]    Anesthesia Start: 1339 Anesthesia Stop: 1744    Procedure: TOTAL KNEE ARTHROPLASTY REVISION (Right: Knee) Diagnosis:       Instability of internal right knee prosthesis, initial encounter      (Instability of internal right knee prosthesis, initial encounter [T84.022A])    Surgeons: Nancy Evans MD Provider: Jose M Vizcarra MD    Anesthesia Type: general ASA Status: 2            Anesthesia Type: general    Vitals  Vitals Value Taken Time   /74 11/01/24 1930   Temp 36.4 °C (97.6 °F) 11/01/24 1930   Pulse 84 11/01/24 1930   Resp 18 11/01/24 1915   SpO2 98 % 11/01/24 1930   Vitals shown include unfiled device data.        Post Anesthesia Care and Evaluation    Patient location during evaluation: bedside  Patient participation: complete - patient participated  Level of consciousness: awake  Pain management: adequate    Airway patency: patent  Anesthetic complications: No anesthetic complications    Cardiovascular status: acceptable  Respiratory status: acceptable  Hydration status: acceptable

## 2024-11-01 NOTE — ANESTHESIA PREPROCEDURE EVALUATION
Anesthesia Evaluation     Patient summary reviewed and Nursing notes reviewed   no history of anesthetic complications:   NPO Solid Status: > 8 hours  NPO Liquid Status: > 2 hours           Airway   Mallampati: II  TM distance: >3 FB  Neck ROM: full  Dental      Pulmonary    Cardiovascular     ECG reviewed    (+) hypertension    ROS comment: EKG unremarkable    Neuro/Psych  GI/Hepatic/Renal/Endo    (+) obesity    Musculoskeletal     Abdominal   (+) obese   Substance History      OB/GYN          Other                          Anesthesia Plan    ASA 2     general     intravenous induction     Anesthetic plan, risks, benefits, and alternatives have been provided, discussed and informed consent has been obtained with: patient.        CODE STATUS:

## 2024-11-01 NOTE — ANESTHESIA PROCEDURE NOTES
Peripheral Block    Pre-sedation assessment completed: 11/1/2024 11:30 AM    Patient reassessed immediately prior to procedure    Patient location during procedure: pre-op  Start time: 11/1/2024 12:09 PM  Stop time: 11/1/2024 12:11 PM  Reason for block: at surgeon's request and post-op pain management  Performed by  Anesthesiologist: Jose M Vizcarra MD  Preanesthetic Checklist  Completed: patient identified, IV checked, site marked, risks and benefits discussed, surgical consent, monitors and equipment checked, pre-op evaluation and timeout performed  Prep:  Pt Position: supine  Sterile barriers:cap, mask and washed/disinfected hands  Prep: ChloraPrep  Patient monitoring: blood pressure monitoring, continuous pulse oximetry and EKG  Procedure    Sedation: yes  Performed under: local infiltration  Guidance:ultrasound guided    ULTRASOUND INTERPRETATION.  Using ultrasound guidance a gauge needle was placed in close proximity to the femoral nerve, at which point, under ultrasound guidance anesthetic was injected in the area of the nerve and spread of the anesthesia was seen on ultrasound in close proximity thereto.  There were no abnormalities seen on ultrasound; a digital image was taken; and the patient tolerated the procedure with no complications. Images:still images obtained, printed/placed on chart    Laterality:right  Block Type:adductor canal block  Injection Technique:single-shot  Needle Type:echogenic  Needle Gauge:21 G  Resistance on Injection: none    Medications Used: dexamethasone (DECADRON) injection - Injection   4 mg - 11/1/2024 12:11:00 PM  ropivacaine (NAROPIN) 0.5 % injection - Injection   15 mL - 11/1/2024 12:11:00 PM      Post Assessment  Injection Assessment: negative aspiration for heme, no paresthesia on injection and incremental injection  Patient Tolerance:comfortable throughout block  Complications:no  Additional Notes  Ultrasound guidance used to visualize nerve anatomy, guide needle  placement and verify local anesthetic disbursement.       Performed by: Jose M Vizcarra MD

## 2024-11-02 VITALS
OXYGEN SATURATION: 94 % | SYSTOLIC BLOOD PRESSURE: 105 MMHG | HEIGHT: 66 IN | BODY MASS INDEX: 30.58 KG/M2 | TEMPERATURE: 98.7 F | HEART RATE: 85 BPM | DIASTOLIC BLOOD PRESSURE: 67 MMHG | RESPIRATION RATE: 16 BRPM | WEIGHT: 190.26 LBS

## 2024-11-02 LAB
ANION GAP SERPL CALCULATED.3IONS-SCNC: 13 MMOL/L (ref 5–15)
BASOPHILS # BLD AUTO: 0.02 10*3/MM3 (ref 0–0.2)
BASOPHILS NFR BLD AUTO: 0.1 % (ref 0–1.5)
BUN SERPL-MCNC: 13 MG/DL (ref 6–20)
BUN/CREAT SERPL: 13.4 (ref 7–25)
CALCIUM SPEC-SCNC: 8.3 MG/DL (ref 8.6–10.5)
CHLORIDE SERPL-SCNC: 99 MMOL/L (ref 98–107)
CO2 SERPL-SCNC: 20 MMOL/L (ref 22–29)
CREAT SERPL-MCNC: 0.97 MG/DL (ref 0.57–1)
DEPRECATED RDW RBC AUTO: 42.1 FL (ref 37–54)
EGFRCR SERPLBLD CKD-EPI 2021: 69.2 ML/MIN/1.73
EOSINOPHIL # BLD AUTO: 0 10*3/MM3 (ref 0–0.4)
EOSINOPHIL NFR BLD AUTO: 0 % (ref 0.3–6.2)
ERYTHROCYTE [DISTWIDTH] IN BLOOD BY AUTOMATED COUNT: 12.5 % (ref 12.3–15.4)
GLUCOSE SERPL-MCNC: 128 MG/DL (ref 65–99)
HCT VFR BLD AUTO: 39 % (ref 34–46.6)
HGB BLD-MCNC: 13.3 G/DL (ref 12–15.9)
IMM GRANULOCYTES # BLD AUTO: 0.05 10*3/MM3 (ref 0–0.05)
IMM GRANULOCYTES NFR BLD AUTO: 0.4 % (ref 0–0.5)
LYMPHOCYTES # BLD AUTO: 1.18 10*3/MM3 (ref 0.7–3.1)
LYMPHOCYTES NFR BLD AUTO: 8.5 % (ref 19.6–45.3)
MCH RBC QN AUTO: 31.7 PG (ref 26.6–33)
MCHC RBC AUTO-ENTMCNC: 34.1 G/DL (ref 31.5–35.7)
MCV RBC AUTO: 92.9 FL (ref 79–97)
MONOCYTES # BLD AUTO: 0.5 10*3/MM3 (ref 0.1–0.9)
MONOCYTES NFR BLD AUTO: 3.6 % (ref 5–12)
NEUTROPHILS NFR BLD AUTO: 12.11 10*3/MM3 (ref 1.7–7)
NEUTROPHILS NFR BLD AUTO: 87.4 % (ref 42.7–76)
NRBC BLD AUTO-RTO: 0 /100 WBC (ref 0–0.2)
PLATELET # BLD AUTO: 298 10*3/MM3 (ref 140–450)
PMV BLD AUTO: 9.1 FL (ref 6–12)
POTASSIUM SERPL-SCNC: 4.5 MMOL/L (ref 3.5–5.2)
RBC # BLD AUTO: 4.2 10*6/MM3 (ref 3.77–5.28)
SODIUM SERPL-SCNC: 132 MMOL/L (ref 136–145)
WBC NRBC COR # BLD AUTO: 13.86 10*3/MM3 (ref 3.4–10.8)

## 2024-11-02 PROCEDURE — 85025 COMPLETE CBC W/AUTO DIFF WBC: CPT | Performed by: ORTHOPAEDIC SURGERY

## 2024-11-02 PROCEDURE — 97110 THERAPEUTIC EXERCISES: CPT

## 2024-11-02 PROCEDURE — 80048 BASIC METABOLIC PNL TOTAL CA: CPT | Performed by: ORTHOPAEDIC SURGERY

## 2024-11-02 PROCEDURE — 25010000002 CEFAZOLIN PER 500 MG: Performed by: ORTHOPAEDIC SURGERY

## 2024-11-02 PROCEDURE — 25010000002 KETOROLAC TROMETHAMINE PER 15 MG: Performed by: ORTHOPAEDIC SURGERY

## 2024-11-02 PROCEDURE — 25010000002 HYDROMORPHONE PER 4 MG: Performed by: ORTHOPAEDIC SURGERY

## 2024-11-02 PROCEDURE — 97162 PT EVAL MOD COMPLEX 30 MIN: CPT

## 2024-11-02 RX ORDER — KETOROLAC TROMETHAMINE 30 MG/ML
30 INJECTION, SOLUTION INTRAMUSCULAR; INTRAVENOUS ONCE AS NEEDED
Status: COMPLETED | OUTPATIENT
Start: 2024-11-02 | End: 2024-11-02

## 2024-11-02 RX ORDER — BISACODYL 5 MG/1
10 TABLET, DELAYED RELEASE ORAL DAILY PRN
Qty: 10 TABLET | Refills: 0 | Status: SHIPPED | OUTPATIENT
Start: 2024-11-02 | End: 2024-11-11

## 2024-11-02 RX ORDER — HYDROCODONE BITARTRATE AND ACETAMINOPHEN 7.5; 325 MG/1; MG/1
1 TABLET ORAL EVERY 4 HOURS PRN
Qty: 30 TABLET | Refills: 0 | Status: SHIPPED | OUTPATIENT
Start: 2024-11-02 | End: 2024-11-11

## 2024-11-02 RX ORDER — ONDANSETRON 4 MG/1
4 TABLET, ORALLY DISINTEGRATING ORAL EVERY 6 HOURS PRN
Qty: 30 TABLET | Refills: 0 | Status: SHIPPED | OUTPATIENT
Start: 2024-11-02

## 2024-11-02 RX ORDER — ASPIRIN 81 MG/1
81 TABLET ORAL EVERY 12 HOURS SCHEDULED
Qty: 60 TABLET | Refills: 0 | Status: SHIPPED | OUTPATIENT
Start: 2024-11-02 | End: 2024-12-02

## 2024-11-02 RX ADMIN — HYDROMORPHONE HYDROCHLORIDE 0.5 MG: 1 INJECTION, SOLUTION INTRAMUSCULAR; INTRAVENOUS; SUBCUTANEOUS at 12:13

## 2024-11-02 RX ADMIN — HYDROCODONE BITARTRATE AND ACETAMINOPHEN 2 TABLET: 7.5; 325 TABLET ORAL at 06:38

## 2024-11-02 RX ADMIN — HYDROMORPHONE HYDROCHLORIDE 0.5 MG: 1 INJECTION, SOLUTION INTRAMUSCULAR; INTRAVENOUS; SUBCUTANEOUS at 04:13

## 2024-11-02 RX ADMIN — HYDROMORPHONE HYDROCHLORIDE 0.5 MG: 1 INJECTION, SOLUTION INTRAMUSCULAR; INTRAVENOUS; SUBCUTANEOUS at 08:27

## 2024-11-02 RX ADMIN — HYDROMORPHONE HYDROCHLORIDE 0.5 MG: 1 INJECTION, SOLUTION INTRAMUSCULAR; INTRAVENOUS; SUBCUTANEOUS at 00:42

## 2024-11-02 RX ADMIN — HYDROCODONE BITARTRATE AND ACETAMINOPHEN 2 TABLET: 7.5; 325 TABLET ORAL at 10:42

## 2024-11-02 RX ADMIN — HYDROCHLOROTHIAZIDE 25 MG: 25 TABLET ORAL at 08:27

## 2024-11-02 RX ADMIN — HYDROCODONE BITARTRATE AND ACETAMINOPHEN 2 TABLET: 7.5; 325 TABLET ORAL at 02:42

## 2024-11-02 RX ADMIN — KETOROLAC TROMETHAMINE 30 MG: 30 INJECTION, SOLUTION INTRAMUSCULAR; INTRAVENOUS at 10:30

## 2024-11-02 RX ADMIN — ASPIRIN 81 MG: 81 TABLET, COATED ORAL at 08:27

## 2024-11-02 RX ADMIN — SODIUM CHLORIDE 2000 MG: 900 INJECTION INTRAVENOUS at 04:13

## 2024-11-02 NOTE — CONSULTS
Name: Funmilayo Concepcion ADMIT: 2024   : 1970  PCP: Provider, No Known    MRN: [id number] LOS: 1 days   AGE/SEX: 54 y.o. female  ROOM: [facility]           Inpatient Hospitalist Consult  Consult performed by: Liyah Lofton DO  Consult ordered by: Nancy Evans MD      Date of Admit: 2024  Date of Consult: 24    Subjective   History of Present Illness    Funmilayo is a 54F with PMHx of HTN and right total knee arthroplasty who was admitted to [facility] by ortho on 2024 due to right knee prosthesis instability.  Admits to right knee pain ongoing for the past 3-4 months along with instability.  Ortho had concerns for mechanical loosening of both the femoral and the tibial component.  Patient elected for surgical revision.  Patient underwent Revision of right total knee arthroplasty on 2024 without complication.  Hospitalist consulted for medical management while inpatient.        Past Medical History:   Diagnosis Date    Arthritis     Hypertension     FOLLOWED BY [facility]    Instability of prosthesis of right knee joint     Right knee pain      Past Surgical History:   Procedure Laterality Date    ACHILLES TENDON REPAIR Left     X2    COLONOSCOPY      FOOT SURGERY Right     HEEL SPURS    HYSTERECTOMY      KNEE ACL RECONSTRUCTION Right 2016    TONSILLECTOMY      TOTAL KNEE ARTHROPLASTY Right     TOTAL KNEE ARTHROPLASTY REVISION Right 2022    Procedure: TOTAL KNEE ARTHROPLASTY REVISION;  Surgeon: Kevin Green MD;  [place] [facility] [place] [facility] [place]  Service: Orthopedics;  Laterality: Right;     Family History   Problem Relation Age of Onset    Hypertension Other     Diabetes Other     Throat cancer Other     Lung cancer Other     Ovarian cancer Other     Prostate cancer Other     Malig Hyperthermia Neg Hx      Social History     Tobacco Use    Smoking status: Never    Smokeless tobacco: Never   Vaping Use    Vaping status: Never Used   Substance Use Topics    Alcohol use: No     Drug use: No     Medications Prior to Admission   Medication Sig Dispense Refill Last Dose/Taking    acetaminophen (TYLENOL) 500 MG tablet Take 1 tablet by mouth Every 6 (Six) Hours As Needed for Mild Pain.   Past Month    amLODIPine (NORVASC) 5 MG tablet Take 0.5 tablets by mouth Every Night.   10/31/2024 at 10:00 PM    hydroCHLOROthiazide (HYDRODIURIL) 25 MG tablet Take 1 tablet by mouth Daily.   10/31/2024 at 10:00 PM    LOSARTAN POTASSIUM PO Take  by mouth Daily.   10/31/2024 at 10:00 PM    docusate sodium (COLACE) 250 MG capsule Take 1 capsule by mouth 2 (Two) Times a Day As Needed for Constipation. (Patient not taking: Reported on 10/25/2024) 30 capsule 1      Allergies:  Nickel    Review of Systems  12 point ROS reviewed and negative except as mentioned above      Objective      Vital Signs  Temp:  [97.4 °F (36.3 °C)-98.3 °F (36.8 °C)] 97.9 °F (36.6 °C)  Heart Rate:  [69-95] 90  Resp:  [15-20] 16  BP: (105-155)/(65-95) 105/78  Body mass index is 31.17 kg/m².    Physical Exam  Constitutional:       General: She is not in acute distress.     Appearance: Normal appearance. She is not toxic-appearing.   HENT:      Head: Normocephalic and atraumatic.      Nose: Nose normal. No congestion.      Mouth/Throat:      Pharynx: No oropharyngeal exudate.   Eyes:      General: No scleral icterus.  Cardiovascular:      Rate and Rhythm: Normal rate and regular rhythm.      Heart sounds: No murmur heard.     No friction rub. No gallop.   Pulmonary:      Effort: No respiratory distress.      Breath sounds: No wheezing.   Musculoskeletal:      Cervical back: Normal range of motion. No rigidity.   Neurological:      Mental Status: She is alert.         Results Review:    I reviewed the patient's new clinical results.      Assessment & Plan       Instability of internal left knee prosthesis, initial encounter    Chronic knee pain after total replacement of right knee joint    History of revision of total replacement of right  knee joint    Instability of internal right knee prosthesis, initial encounter    Status post revision of total replacement of right knee      Assessment & Plan    #Right knee prosthesis instability    - POD # 1 revision right total knee arthroplasty    - pt/ot    - pain control per ortho    - medically stable for discharge from medicine perspective    #HTN    - Home Norvasc and HCTZ resumed      #Hyponatremia    - sodium 132 on morning labs, suspect related to HCTZ    - follow up labs as otpt      Thank you very much for asking [facility] to be involved in this patient's care.  We will follow along with you.      Liyah Lofton DO  [facility]  11/02/24  07:10 EDT

## 2024-11-02 NOTE — PLAN OF CARE
Goal Outcome Evaluation:  Plan of Care Reviewed With: patient        Progress: improving  Outcome Evaluation: Patient ambulating without difficulties. Drain and IV removed. Patient eager for discharge. [facility]. Eduacted on pain medication management.

## 2024-11-02 NOTE — PROGRESS NOTES
[facility] ORTHO     [phone]  [facility]  Orthopedics  [address], [address]  [place] [address] [phone]  [facility]  Orthopedics  [address]  [place] [address]     Orthopedic Progress Note    Subjective :   Patient seen and examined. Resting comfortably. No issues overnight. Pain controlled. Walked yesterday with PT.    Drain output 185 cc overnight.  Pain is controlled.    Objective :    Vital signs in last 24 hours:  Temp:  [97.4 °F (36.3 °C)-98.3 °F (36.8 °C)] 97.9 °F (36.6 °C)  Heart Rate:  [69-95] 90  Resp:  [15-20] 16  BP: (105-155)/(65-95) 105/78  Vitals:    11/01/24 1947 11/01/24 2058 11/02/24 0054 11/02/24 0504   BP: 147/87 144/84 129/86 105/78   BP Location: Left arm Left arm Left arm Left arm   Patient Position: Lying Lying Lying Lying   Pulse: 92 88 87 90   Resp: 16 16 16 16   Temp: 97.4 °F (36.3 °C)  97.5 °F (36.4 °C) 97.9 °F (36.6 °C)   TempSrc: Oral  Oral Oral   SpO2: 93% 98% 100% 100%   Weight:       Height:           PHYSICAL EXAM:  Patient is calm, in no acute distress, awake and oriented x 3.  Dressing clean, dry and intact.  No signs of infection.  Swelling is appropriate.  Ecchymosis is appropriate in amount.  Homans test is negative.  Patient is neurovascularly intact distally.  EHL,FHL,TA,GS are intact  DP/TP 2+    LABS:  Results from last 7 days   Lab Units 11/02/24  0313   WBC 10*3/mm3 13.86*   HEMOGLOBIN g/dL 13.3   HEMATOCRIT % 39.0   PLATELETS 10*3/mm3 298     Results from last 7 days   Lab Units 11/02/24  0312   SODIUM mmol/L 132*   POTASSIUM mmol/L 4.5   CHLORIDE mmol/L 99   CO2 mmol/L 20.0*   BUN mg/dL 13   CREATININE mg/dL 0.97   GLUCOSE mg/dL 128*   CALCIUM mg/dL 8.3*           XR Knee 1 or 2 View Right    Result Date: 11/1/2024  XR KNEE 1 OR 2 VW RIGHT-  CLINICAL HISTORY:Total knee arthroplasty revision; T84.022A-Instability of internal right knee prosthesis, initial encounter;  M25.561-Pain in right knee; G89.29-Other chronic pain; Z96.651-Presence of right artificial knee joint; Z96.651-Presence of right artificial knee joint  FINDINGS:  The patient is status post a recent right knee arthroplasty procedure. The prosthesis is well-positioned without significant periprosthetic lucency. There is no evidence for a fracture. Typical recent postoperative changes are noted within the adjacent soft tissues.  This report was finalized on 11/1/2024 6:24 PM by Dr. Allen Altamirano M.D on Workstation: [id number]        ASSESSMENT:  Status post right Total knee revision arthroplasty, POD# 1    Plan:    Continue Physical Therapy, WBAT. ROM as tolerated with Physical therapy  Continue SCDs, Continue with ECASA 81mg one tab PO BID x30 days.  Dispo planning for home with home health when medically stable.  Follow-up in Dr. Evans's office in 2 weeks.     We will continue to monitor drain output this morning.  If continues to slow down could possibly plan for discharge home with home health care today.  I have discussed the above with my supervising physician.  Further recommendations to follow.    Matthew Marcum PA-C    Date: 11/2/2024  Time: 08:00 EDT

## 2024-11-02 NOTE — CASE MANAGEMENT/SOCIAL WORK
Continued Stay Note  [facility]     Patient Name: Funmilayo Concepcion  MRN: [id number]  Today's Date: 11/2/2024    Admit Date: 11/1/2024        Discharge Plan       Row Name 11/02/24 1358       Plan    Plan Comments Inbound call from RN notifying CCP that pt needs home health arranged. Pt is actively leaving for discharge. Call to pt's cell phone, no answer, left VM. Await return call. [facility]                   Discharge Codes    No documentation.                 Expected Discharge Date and Time       Expected Discharge Date Expected Discharge Time    Nov 2, 2024               Junior Marti RN

## 2024-11-02 NOTE — DISCHARGE INSTRUCTIONS
Discharge and Follow up Instructions:      Total Knee Joint Replacement Discharge Instructions:     I. ACTIVITIES:  1. Exercises:  Complete exercise program as taught by the hospital physical therapist 2 times per day  Exercise program will be advanced by your home health physical therapist  During the day be up ambulating every 2 hours (while awake) for short distances  Complete the ankle pump exercises at least 10 times per hour (while awake)  Elevate legs most of the day the first week post operatively and thereafter elevate legs when in bed and for at least 30 minutes during the day.   Caution must be taken to avoid pillow placement under the bend of the knee as this can led to flexion contractures of the knee. Pillow placement under the heel is encouraged.  Use cold packs 20-30 minutes approximately 5 times per day. This should be done before and after completing your exercises and at any time you are experiencing pain/ stiffness in your operative extremity.        2. Activities of Daily Living:  No tub baths, hot tubs, or swimming pools for 4 weeks  May shower and let water run over the incision on post-operative day #5 if no drainage. Do not scrub or rub the incision. Simply let the water run over the incision and pat dry.     II. Restrictions  Do not cross legs or kneel  Your surgeon will discuss with you when you will be able to drive again. Usual guidelines are you are to be off pain medications prior to driving.  Weight bearing is as tolerated  First week stay inside on even terrain. May go up and down stairs one stair at a time utilizing the hand rail.  After one week, you may venture outside.     III. Precautions:  Everyone that comes near you should wash their hands  No elective dental, genital-urinary, or colon procedures or surgical procedures for 12 weeks after surgery unless absolutely necessary.   If dental work or surgical procedure is deemed absolutely necessary, you will need to contact your  surgeon as you will need to take antibiotics 1 hour prior to any dental work (including teeth cleanings).  Please discuss with your surgeon prophylactic antibiotics as the length of time this intervention will be necessary for you varies with each patient’s health history and situation.  Avoid sick people. If you must be around someone who is ill, they should wear a mask.  Avoid visits to the Emergency Room or Urgent Care. If you feel you need to go to the emergency room, please notify your surgeon.    Stockings are to be worn for one week after surgery and are to be placed on in the morning and removed at night. Observe your skin when stocking is removed for any problems. Monitor the stockings to ensure that any swelling is not causing the stockings to become too tight. In this case, remove stockings immediately.     IV. INCISION CARE:  Wash your hands prior to dressing changes  Change the dressing as needed to keep incision clean and dry. Utilize dry gauze and paper tape. Avoid touching the side of the gauze that goes against the incision with your hands.  No creams or ointments to the incision  May remove dressing once the incision is free of drainage  Do not touch or pick at the incision  Check incision every day and notify surgeon immediately if any of the following signs or symptoms are noted:  Increase in redness  Increase in swelling around the incision and of the entire extremity  Increase in pain  Drainage oozing from the incision  Pulling apart of the edges of the incision  Increase in overall body temperature (greater than 100.5 degrees)      You have absorbable sutures with steristrips/ Exofin Fusion, please do not remove the steri strips/ Exofin Fusion for 14 days, you can shower on them 6 days after surgery.           V. Medications:   1. Anticoagulants: You will be discharged on an anticoagulant. This is a prophylactic medication that helps prevent blood clots during your post-operative period.  You  will be on Aspirin  81 mg twice daily for 30 days. If you were on Aspirin 81 mg prior to surgery you can go back to home dose once the 30 days are completed.      While taking the anticoagulant, you should avoid taking any additional aspirin, ibuprofen (Advil or Motrin), Aleve (Naprosyn) or other non-steroidal anti-inflammatory medications.   Notify surgeon immediately if any chace bleeding is noted in the urine, stool, emesis, or from the nose or the incision. Blood in the stool will often appear as black rather than red. Blood in urine may appear as pink. Blood in emesis may appear as brown/black like coffee grounds.  You will need to apply pressure for longer periods of time to any cuts or abrasions to stop bleeding  Avoid alcohol while taking anticoagulants     2. Stool Softeners: You will be at greater risk of constipation after surgery due to being less mobile and the pain medications.   Take stool softeners as instructed by your surgeon while on pain medications. Over the counter Colace 100 mg 1-2 capsules twice daily.   If stools become too loose or too frequent, please decreases the dosage or stop the stool softener.  If constipation occurs despite use of stool softeners, you are to continue the stool softeners and add a laxative (Milk of Magnesia 1 ounce daily as needed).  Dulcolax oral tabs or suppository, or a fleets enema can also be utilized for constipation and can be obtained over the counter.   If above interventions are unsuccessful in inducing bowel movements, please contact your surgeon's office / family physician's office.  Drink plenty of fluids, and eat fruits and vegetables during your recovery time     3. Pain Medications utilized after surgery are narcotics and the law requires that the following information be given to all patients that are prescribed narcotics:  CLASSIFICATION: Pain medications are called Opioids and are narcotics  LEGALITIES: It is illegal to share narcotics with others  and to drive within 24 hours of taking narcotics  POTENTIAL SIDE EFFECTS: Potential side effects of opioids include: nausea, vomiting, itching, dizziness, drowsiness, dry mouth, constipation, and difficulty urinating.  POTENTIAL ADVERSE EFFECTS:   Opioid tolerance can develop with use of pain medications and this simply means that it requires more and more of the medication to control pain; however, this is seen more in patients that use opioids for longer periods of time.  Opioid dependence can develop with use of Opioids and this simply means that to stop the medication can cause withdrawal symptoms; however, this is seen with patients that use Opioids for longer periods of time.  Opioid addiction can develop with use of Opioids and the incidence of this is very unlikely in patients who take the medications as ordered and stop the medications as instructed.  Opioid overdose can be dangerous, but is unlikely when the medication is taken as ordered and stopped when ordered. It is important not to mix opioids with alcohol or with and type of sedative such as Benadryl as this can lead to over sedation and respiratory difficulty.  DOSAGE:   Pain medications will need to be taken consistently for the first week to decrease pain and promote adequate pain relief and participation in physical therapy.  After the initial surgical pain begins to resolve, you may begin to decrease the pain medication. By the end of 6 weeks, you should be off of pain medications.  Refills will not be given by the office during evening hours, on weekends, or after 6 weeks post-op.  To seek refills on pain medications during the initial 6 week post-operative period, you must call the office 48 hours in advance to request the refill. The office will then notify you when to  the prescription. DO NOT wait until you are out of the medication to request a refill.     V. FOLLOW-UP VISITS:  You will need to follow up in the office with your  surgeon on November 13 ,2024.  Please call this number [phone] to schedule this appointment.  If you have any concerns or suspected complications prior to your follow up visit, please call your surgeons office. Do not wait until your appointment time if you suspect complications. These will need to be addressed in the office promptly.

## 2024-11-02 NOTE — PLAN OF CARE
Goal Outcome Evaluation:  Plan of Care Reviewed With: patient, parent           Outcome Evaluation: Pt is POD 1 R TKRevision, she did well with mobility this morning, able to walk 150 ft with a rolling walker and go up and down 4 stairs with stand by assist, she performed exercises with good control and effort. Pt will be going home with her mother assisting her and will cont PT with home health.    Anticipated Discharge Disposition (PT): home with home health, home with assist

## 2024-11-02 NOTE — PLAN OF CARE
Goal Outcome Evaluation:  Plan of Care Reviewed With: patient        Progress: improving  Outcome Evaluation: Pt remains stable. Very drowsy upon arrival from PACU, has since been up to BSC with 1 assist. Voiding well. Norco and dilaudid given PRN for pain. Dressing is cdi with hv also in place. 2L NC required when asleep. Will get PT eval today, possible dc home with HH.

## 2024-11-02 NOTE — CASE MANAGEMENT/SOCIAL WORK
Case Management Discharge Note      Final Note: dc home with [facility]         Selected Continued Care - Discharged on 11/2/2024 Admission date: 11/1/2024 - Discharge disposition: Home-Health Care Svc      Destination    No services have been selected for the patient.                Durable Medical Equipment    No services have been selected for the patient.                Dialysis/Infusion    No services have been selected for the patient.                Home Medical Care    No services have been selected for the patient.                Therapy    No services have been selected for the patient.                Community Resources    No services have been selected for the patient.                Community & DME    No services have been selected for the patient.                         Final Discharge Disposition Code: 06 - home with home health care

## 2024-11-02 NOTE — THERAPY EVALUATION
Patient Name: Funmilayo Concepcion  : 1970    MRN: [id number]                              Today's Date: 2024       Admit Date: 2024    Visit Dx:     ICD-10-CM ICD-9-CM   1. Instability of internal right knee prosthesis, initial encounter  T84.022A 996.42     V43.65   2. Chronic knee pain after total replacement of right knee joint  M25.561 719.46    G89.29 V43.65    Z96.651    3. History of revision of total replacement of right knee joint  Z96.651 V43.65   4. Status post revision of total replacement of right knee  Z96.651 V43.65     Patient Active Problem List   Diagnosis    S/P ACL reconstruction    Pain and swelling of knee    Aseptic loosening of prosthetic knee    Mechanical loosening of prosthetic knee, initial encounter    Chronic knee pain after total replacement of right knee joint    Instability of internal right knee prosthesis    History of revision of total replacement of right knee joint    Instability of internal right knee prosthesis, initial encounter    Instability of internal left knee prosthesis, initial encounter    Status post revision of total replacement of right knee     Past Medical History:   Diagnosis Date    Arthritis     Hypertension     FOLLOWED BY [facility]    Instability of prosthesis of right knee joint     Right knee pain      Past Surgical History:   Procedure Laterality Date    ACHILLES TENDON REPAIR Left     X2    COLONOSCOPY      FOOT SURGERY Right     HEEL SPURS    HYSTERECTOMY      KNEE ACL RECONSTRUCTION Right 2016    TONSILLECTOMY      TOTAL KNEE ARTHROPLASTY Right     TOTAL KNEE ARTHROPLASTY REVISION Right 2022    Procedure: TOTAL KNEE ARTHROPLASTY REVISION;  Surgeon: Kevin Green MD;  Location: [facility];  Service: Orthopedics;  Laterality: Right;      General Information       Row Name 24 1041          Physical Therapy Time and Intention    Document Type evaluation  -PC     Mode of Treatment physical therapy  -PC       Row Name  11/02/24 1041          General Information    Patient Profile Reviewed yes  -PC     Existing Precautions/Restrictions fall  -PC       Row Name 11/02/24 1041          Living Environment    People in Home --  pt's mother will be assisting her  -PC       Row Name 11/02/24 1041          Cognition    Orientation Status (Cognition) oriented x 4  -PC       Row Name 11/02/24 1041          Safety Issues/Impairments Affecting Functional Mobility    Safety Issues Affecting Function (Mobility) awareness of need for assistance;insight into deficits/self-awareness  -PC     Impairments Affecting Function (Mobility) balance;endurance/activity tolerance;pain;range of motion (ROM);strength  -PC               User Key  (r) = Recorded By, (t) = Taken By, (c) = Cosigned By      Initials Name Provider Type    PC Ernestine Tubbs, PT Physical Therapist                   Mobility       Row Name 11/02/24 1042          Bed Mobility    Bed Mobility supine-sit  -PC     Supine-Sit Luce (Bed Mobility) independent  -PC       Row Name 11/02/24 1042          Sit-Stand Transfer    Sit-Stand Luce (Transfers) standby assist  -PC     Assistive Device (Sit-Stand Transfers) walker, front-wheeled  -PC       Row Name 11/02/24 1042          Gait/Stairs (Locomotion)    Luce Level (Gait) standby assist  -PC     Assistive Device (Gait) walker, front-wheeled  -PC     Distance in Feet (Gait) 150  -PC     Deviations/Abnormal Patterns (Gait) antalgic;gait speed decreased  -PC     Luce Level (Stairs) stand by assist  -PC     Handrail Location (Stairs) both sides  -PC     Number of Steps (Stairs) 4  -PC     Ascending Technique (Stairs) step-to-step  -PC     Descending Technique (Stairs) step-to-step  -PC       Row Name 11/02/24 1042          Mobility    Extremity Weight-bearing Status right lower extremity  -PC     Right Lower Extremity (Weight-bearing Status) weight-bearing as tolerated (WBAT)  -PC               User Key  (r) =  Recorded By, (t) = Taken By, (c) = Cosigned By      Initials Name Provider Type    PC Ernestine Tubbs, PT Physical Therapist                   Obj/Interventions       Row Name 11/02/24 1043          Range of Motion Comprehensive    Comment, General Range of Motion WNL x R knee  -PC       Row Name 11/02/24 1043          Strength Comprehensive (MMT)    Comment, General Manual Muscle Testing (MMT) Assessment WNL x R knee, pt able to perform SLR independently  -PC       Row Name 11/02/24 1043          Motor Skills    Therapeutic Exercise --  R TKR ex per protocol 5-10 reps  -PC       Row Name 11/02/24 1043          Balance    Comment, Balance WNL  -PC       Row Name 11/02/24 1043          Sensory Assessment (Somatosensory)    Sensory Assessment (Somatosensory) LE sensation intact  -PC               User Key  (r) = Recorded By, (t) = Taken By, (c) = Cosigned By      Initials Name Provider Type    PC Ernestine Tubbs, PT Physical Therapist                   Goals/Plan       Row Name 11/02/24 1048          Therapy Assessment/Plan (PT)    Planned Therapy Interventions (PT) bed mobility training;gait training;strengthening;ROM (range of motion);patient/family education  -PC               User Key  (r) = Recorded By, (t) = Taken By, (c) = Cosigned By      Initials Name Provider Type    PC Ernestine Tubbs, PT Physical Therapist                   Clinical Impression       Row Name 11/02/24 1045          Pain    Pretreatment Pain Rating 6/10  -PC     Posttreatment Pain Rating 6/10  -PC     Pain Location knee  -PC     Pain Side/Orientation right  -PC     Pain Management Interventions activity modification encouraged;positioning techniques utilized;cold applied  -PC     Response to Pain Interventions activity participation with tolerable pain  -PC     Pre/Posttreatment Pain Comment RN in to give pain meds  -PC       Row Name 11/02/24 1045          Plan of Care Review    Plan of Care Reviewed With patient;parent  -PC     Outcome  Evaluation Pt is POD 1 R TKRevision, she did well with mobility this morning, able to walk 150 ft with a rolling walker and go up and down 4 stairs with stand by assist, she performed exercises with good control and effort. Pt will be going home with her mother assisting her and will cont PT with home health.  -PC       Row Name 11/02/24 1045          Therapy Assessment/Plan (PT)    Rehab Potential (PT) good  -PC     Criteria for Skilled Interventions Met (PT) yes;skilled treatment is necessary  -PC     Therapy Frequency (PT) evaluation only  -PC       Row Name 11/02/24 1045          Positioning and Restraints    Pre-Treatment Position in bed  -PC     Post Treatment Position chair  -PC     In Chair reclined;call light within reach;encouraged to call for assist;exit alarm on;with family/caregiver  -PC               User Key  (r) = Recorded By, (t) = Taken By, (c) = Cosigned By      Initials Name Provider Type    PC Ernestine Tubbs, PT Physical Therapist                   Outcome Measures       Row Name 11/02/24 1049 11/02/24 0827       How much help from another person do you currently need...    Turning from your back to your side while in flat bed without using bedrails? 4  -PC 4  -ST    Moving from lying on back to sitting on the side of a flat bed without bedrails? 4  -PC 3  -ST    Moving to and from a bed to a chair (including a wheelchair)? 4  -PC 3  -ST    Standing up from a chair using your arms (e.g., wheelchair, bedside chair)? 4  -PC 3  -ST    Climbing 3-5 steps with a railing? 3  -PC 2  -ST    To walk in hospital room? 3  -PC 3  -ST    AM-PAC 6 Clicks Score (PT) 22  -PC 18  -ST              User Key  (r) = Recorded By, (t) = Taken By, (c) = Cosigned By      Initials Name Provider Type    PC Ernestine Tubbs, PT Physical Therapist    Ying Schmidt, RN Registered Nurse                                 Physical Therapy Education       Title: PT OT SLP Therapies (Done)       Topic: Physical Therapy (Done)        Point: Mobility training (Done)       Learning Progress Summary            Patient Acceptance, E,D, DU by PC at 11/2/2024 1049   Family Acceptance, E,D, DU by PC at 11/2/2024 1049                      Point: Home exercise program (Done)       Learning Progress Summary            Patient Acceptance, E,D, DU by PC at 11/2/2024 1049   Family Acceptance, E,D, DU by PC at 11/2/2024 1049                      Point: Body mechanics (Done)       Learning Progress Summary            Patient Acceptance, E,D, DU by PC at 11/2/2024 1049   Family Acceptance, E,D, DU by PC at 11/2/2024 1049                      Point: Precautions (Done)       Learning Progress Summary            Patient Acceptance, E,D, DU by PC at 11/2/2024 1049   Family Acceptance, E,D, DU by PC at 11/2/2024 1049                                      User Key       Initials Effective Dates Name Provider Type [facility] 06/16/21 -  Ernestine Tubbs PT Physical Therapist PT                  PT Recommendation and Plan  Planned Therapy Interventions (PT): bed mobility training, gait training, strengthening, ROM (range of motion), patient/family education  Outcome Evaluation: Pt is POD 1 R TKRevision, she did well with mobility this morning, able to walk 150 ft with a rolling walker and go up and down 4 stairs with stand by assist, she performed exercises with good control and effort. Pt will be going home with her mother assisting her and will cont PT with home health.     Time Calculation:         PT Charges       Row Name 11/02/24 1049             Time Calculation    Start Time 1020  -PC      Stop Time 1040  -PC      Time Calculation (min) 20 min  -PC      PT Received On 11/02/24  -                User Key  (r) = Recorded By, (t) = Taken By, (c) = Cosigned By      Initials Name Provider Type    PC Ernestine Tubbs PT Physical Therapist                  Therapy Charges for Today       Code Description Service Date Service Provider Modifiers Qty     33028401814 HC PT EVAL MOD COMPLEXITY 3 11/2/2024 Ernestine Tubbs, PT GP 1    18230324316 HC PT THER PROC EA 15 MIN 11/2/2024 Ernestine Tubbs, PT GP 1            PT G-Codes  AM-PAC 6 Clicks Score (PT): 22  PT Discharge Summary  Anticipated Discharge Disposition (PT): home with home health, home with assist    Ernestine Tubbs, PT  11/2/2024

## 2024-11-02 NOTE — CASE MANAGEMENT/SOCIAL WORK
Continued Stay Note  [facility]     Patient Name: Funmilayo Concepcion  MRN: [id number]  Today's Date: 11/2/2024    Admit Date: 11/1/2024    Plan: Home with [facility]   Discharge Plan       Row Name 11/02/24 1537       Plan    Plan Home with [facility]    Patient/Family in Agreement with Plan yes    Plan Comments Call to pt on cell phone, introduced self and explained role, she would like home health and wants referral to [facility] as she states she has worked with them before. Referral placed. Spoke with [facility] and they accepted. Junior SHELL      Row Name 11/02/24 6719       Plan    Plan Comments Inbound call from RN notifying CCP that pt needs home health arranged. Pt is actively leaving for discharge. Call to pt's cell phone, no answer, left VM. Await return call. Junior SHELL                   Discharge Codes    No documentation.                 Expected Discharge Date and Time       Expected Discharge Date Expected Discharge Time    Nov 2, 2024               Junior Marti RN

## 2024-11-02 NOTE — DISCHARGE PLACEMENT REQUEST
"Kasia Toribio (54 y.o. Female)       Date of Birth   1970    Social Security Number       Address   [address] [place] [address]    Home Phone   [phone]    MRN   [id number]       Baptism   Non-Evangelical    Marital Status   Single                            Admission Date   11/1/24    Admission Type   Elective    Admitting Provider   Nancy Evans MD    Attending Provider       Department, Room/Bed   [address], P797/1       Discharge Date   11/2/2024    Discharge Disposition   Home-Health Care [facility]    Discharge Destination                                 Attending Provider: (none)   Allergies: Nickel    Isolation: None   Infection: None   Code Status: CPR    Ht: 166.4 cm (65.51\")   Wt: 86.3 kg (190 lb 4.1 oz)    Admission Cmt: None   Principal Problem: Instability of internal left knee prosthesis, initial encounter [T84.023A]                   Active Insurance as of 11/1/2024       Primary Coverage       Payor Plan Insurance Group Employer/Plan Group    [facility] [employer] [employer]UM        Payor Plan Address Payor Plan Phone Number Payor Plan Fax Number Effective Dates    [address] [phone]  8/26/2020 - None Entered    [facility] [id number]         Subscriber Name Subscriber Birth Date Member ID       KASIA TORIBIO 1970 [id number]                     Emergency Contacts        (Rel.) Home Phone Work Phone Mobile Phone    CRISTÓBAL TORIBIO (Mother) [phone] -- [phone]                "

## 2024-11-04 ENCOUNTER — HOME HEALTH ADMISSION (OUTPATIENT)
Dept: HOME HEALTH SERVICES | Facility: HOME HEALTHCARE | Age: 54
End: 2024-11-04
Payer: OTHER GOVERNMENT

## 2024-11-04 DIAGNOSIS — Z96.651 AFTERCARE FOLLOWING RIGHT KNEE JOINT REPLACEMENT SURGERY: Primary | ICD-10-CM

## 2024-11-04 DIAGNOSIS — Z47.1 AFTERCARE FOLLOWING RIGHT KNEE JOINT REPLACEMENT SURGERY: Primary | ICD-10-CM

## 2024-11-04 LAB
BACTERIA SPEC AEROBE CULT: NORMAL
GRAM STN SPEC: NORMAL
GRAM STN SPEC: NORMAL

## 2024-11-04 NOTE — PROGRESS NOTES
[facility] given referral for home PT per Dr Evans S/P R knee revision.  All info was verified with patient prior to D/C and is correct.  Dr Evans is aware that patient has [facility] [employer] insurance and therefore Home Health orders need to come through the [facility].  Phoned this AM at 8:50AM and spoke with Babs,  covering for [facility] and expressed need for orders for home PT following surgery.  [facility] is [facility]rozco per Babs. Received call back that auth/orders were given and will be faxed to [facility] intake.

## 2024-11-05 ENCOUNTER — HOME CARE VISIT (OUTPATIENT)
Dept: HOME HEALTH SERVICES | Facility: HOME HEALTHCARE | Age: 54
End: 2024-11-05
Payer: OTHER GOVERNMENT

## 2024-11-05 VITALS
OXYGEN SATURATION: 97 % | HEART RATE: 85 BPM | RESPIRATION RATE: 16 BRPM | SYSTOLIC BLOOD PRESSURE: 140 MMHG | DIASTOLIC BLOOD PRESSURE: 90 MMHG

## 2024-11-05 PROCEDURE — G0151 HHCP-SERV OF PT,EA 15 MIN: HCPCS

## 2024-11-05 NOTE — CASE COMMUNICATION
SOC OASIS completed today.   Pt is a 55yo F s/p R KNEE revision.   PMHX: OA, R knee surgery, HTN  PLOF: works in , , lives alone, enjoys playing sports. Pt has a supportive mother in town.    Pt reports her mother went to  pain medicine RX today.   R knee dressing intact and no visible stains.   R KNEE AROM 15-85 degrees in supine.   FOC: R knee revision.   Skilled PT necessary to instruct pt in R TKA protocol HEP, optim ize her gait, and decrease her risk for falls.   Plan for next visit: review and progress HEP, gait with AAD, review pain/edema management.   Plan for PT 2wk2, 1wk1 then OP PT.

## 2024-11-05 NOTE — HOME HEALTH
Pt is a 55yo F s/p R KNEE revision.   PMHX: OA, R knee surgery, HTN  PLOF: works in , , lives alone, enjoys playing sports. Pt has a supportive mother in town.    Pt reports her mother went to  pain medicine RX today.   R knee dressing intact and no visible stains.   R KNEE AROM 15-85 degrees in supine.   FOC: R knee revision.   Skilled PT necessary to instruct pt in R TKA protocol HEP, optimize her gait, and decrease her risk for falls.   Plan for next visit: review and progress HEP, gait with AAD, review pain/edema management.   Plan for PT 2wk2, 1wk1 then OP PT.

## 2024-11-06 LAB — BACTERIA SPEC ANAEROBE CULT: NORMAL

## 2024-11-07 ENCOUNTER — HOME CARE VISIT (OUTPATIENT)
Dept: HOME HEALTH SERVICES | Facility: HOME HEALTHCARE | Age: 54
End: 2024-11-07
Payer: OTHER GOVERNMENT

## 2024-11-07 VITALS — HEART RATE: 93 BPM | DIASTOLIC BLOOD PRESSURE: 80 MMHG | SYSTOLIC BLOOD PRESSURE: 140 MMHG | OXYGEN SATURATION: 99 %

## 2024-11-07 PROCEDURE — G0159 HHC PT MAINT EA 15 MIN: HCPCS

## 2024-11-07 PROCEDURE — G0151 HHCP-SERV OF PT,EA 15 MIN: HCPCS

## 2024-11-07 NOTE — HOME HEALTH
Pt reports she had a BM last night.  She reports pain 6/10.   She reports she spoke to Dr Evans and he said ok to take Ibuprofen and hydrocodone.  She took 2 800mg ibuprofen in the last 24 hours.  She understands bleeding precautions.  She took the hydrocodone at HS.    She tolerated supine and seated exercises well today.  She also tolerated gait with walker on level surfaces. She prefers to keep the dressing on at this time.     Plan for next visit: trial of cane, standing HEP.

## 2024-11-11 ENCOUNTER — HOME CARE VISIT (OUTPATIENT)
Dept: HOME HEALTH SERVICES | Facility: HOME HEALTHCARE | Age: 54
End: 2024-11-11
Payer: OTHER GOVERNMENT

## 2024-11-11 VITALS — OXYGEN SATURATION: 100 % | SYSTOLIC BLOOD PRESSURE: 140 MMHG | DIASTOLIC BLOOD PRESSURE: 98 MMHG | HEART RATE: 92 BPM

## 2024-11-11 PROCEDURE — G0151 HHCP-SERV OF PT,EA 15 MIN: HCPCS

## 2024-11-11 NOTE — HOME HEALTH
Pt presented walking with a quad cane.  She reports it has been hurting more but ok.  Bowels moving and no complaints.  No falls. Taking hydrocodone only at HS and compliant with ice.   MD Wednesday 10:30AM.  She tolerated entering/exiting the home, retrieving the mail, standing and seated HEP.  Recommend a SPC instead of quad cane for optimal gait.   Plan for next visit: DC OASIS, ensure I with HEP and gait.

## 2024-11-14 ENCOUNTER — HOME CARE VISIT (OUTPATIENT)
Dept: HOME HEALTH SERVICES | Facility: HOME HEALTHCARE | Age: 54
End: 2024-11-14
Payer: OTHER GOVERNMENT

## 2024-11-14 NOTE — CASE COMMUNICATION
Pt messaged therapist this am to state she wanted to cancel due to not feeling well.  Will try again tomorrow or next week.

## 2024-11-15 ENCOUNTER — HOME CARE VISIT (OUTPATIENT)
Dept: HOME HEALTH SERVICES | Facility: HOME HEALTHCARE | Age: 54
End: 2024-11-15
Payer: OTHER GOVERNMENT

## 2024-11-15 NOTE — CASE COMMUNICATION
Patient missed a PT visit from [facility] on 11/15/2024    Reason: declined 11/14 due to not feeling well, offered visit today, but no answer.      For your records only.   Per CMS Guidance, MD must be notified of missed/cancelled visits; therefore the prescribed frequency was not met.

## 2024-11-19 ENCOUNTER — HOME CARE VISIT (OUTPATIENT)
Dept: HOME HEALTH SERVICES | Facility: HOME HEALTHCARE | Age: 54
End: 2024-11-19
Payer: OTHER GOVERNMENT

## 2024-11-19 PROCEDURE — G0151 HHCP-SERV OF PT,EA 15 MIN: HCPCS

## 2024-11-21 VITALS
HEART RATE: 70 BPM | RESPIRATION RATE: 17 BRPM | OXYGEN SATURATION: 98 % | DIASTOLIC BLOOD PRESSURE: 74 MMHG | TEMPERATURE: 97.5 F | SYSTOLIC BLOOD PRESSURE: 128 MMHG

## 2025-08-12 ENCOUNTER — TELEPHONE (OUTPATIENT)
Dept: GENETICS | Facility: HOSPITAL | Age: 55
End: 2025-08-12
Payer: OTHER GOVERNMENT

## 2025-08-13 ENCOUNTER — CLINICAL SUPPORT (OUTPATIENT)
Dept: GENETICS | Facility: HOSPITAL | Age: 55
End: 2025-08-13
Payer: OTHER GOVERNMENT

## 2025-08-13 DIAGNOSIS — Z13.79 GENETIC TESTING: Primary | ICD-10-CM

## (undated) DEVICE — DRAPE,REIN 53X77,STERILE: Brand: MEDLINE

## (undated) DEVICE — BNDG,ELSTC,MATRIX,STRL,6"X5YD,LF,HOOK&LP: Brand: MEDLINE

## (undated) DEVICE — PIN HOLD TEMP NOHEAD FLUT 1/8X3.5IN

## (undated) DEVICE — GLV SURG BIOGEL LTX PF 7 1/2

## (undated) DEVICE — COAXIAL FEMORAL CANAL TIP

## (undated) DEVICE — NEEDLE, QUINCKE 22GX3.5": Brand: MEDLINE INDUSTRIES, INC.

## (undated) DEVICE — RECIPROCATING BLADE, DOUBLE SIDED, OFFSET  (70.0 X 0.64 X 12.6MM)

## (undated) DEVICE — PROXIMATE RH ROTATING HEAD SKIN STAPLERS (35 WIDE) CONTAINS 35 STAINLESS STEEL STAPLES: Brand: PROXIMATE

## (undated) DEVICE — THIN OFFSET (13.0 X 0.38 X 39.0MM)

## (undated) DEVICE — KT DRN EVAC WND PVC PCH WTROC RND 10F400

## (undated) DEVICE — APPL CHLORAPREP HI/LITE 26ML ORNG

## (undated) DEVICE — MICRO SAGITTAL BLADE OFFSET (13.0 X 0.51 X 50.0MM)

## (undated) DEVICE — DRAPE,U/ SHT,SPLIT,PLAS,STERIL: Brand: MEDLINE

## (undated) DEVICE — ANTIBACTERIAL UNDYED BRAIDED (POLYGLACTIN 910), SYNTHETIC ABSORBABLE SUTURE: Brand: COATED VICRYL

## (undated) DEVICE — PK KN TOTL 40

## (undated) DEVICE — TOWEL,OR,DSP,ST,BLUE,STD,4/PK,20PK/CS: Brand: MEDLINE

## (undated) DEVICE — SUT ETHIB 0 CT1 CR8 18IN CX21D

## (undated) DEVICE — GLV SURG BIOGEL LTX PF 8

## (undated) DEVICE — TRAP FLD MINIVAC MEGADYNE 100ML

## (undated) DEVICE — NEEDLE, QUINCKE, 20GX3.5": Brand: MEDLINE

## (undated) DEVICE — PENCL ES MEGADINE EZ/CLEAN BUTN W/HOLSTR 10FT

## (undated) DEVICE — RECIPROCATING BLADE HEAVY DUTY LONG, OFFSET  (77.6 X 0.77 X 11.2MM)

## (undated) DEVICE — BNDG ELAS ELITE V/CLOSE 4IN 5YD LF STRL

## (undated) DEVICE — UNDERCAST PADDING: Brand: DEROYAL

## (undated) DEVICE — PAD,ABDOMINAL,8"X10",ST,LF: Brand: MEDLINE

## (undated) DEVICE — PRECISION THIN (9.0 X 0.38 X 25.0MM)

## (undated) DEVICE — DRSNG GZ PETROLTM XEROFORM CURAD 1X8IN STRL

## (undated) DEVICE — GLV SURG SIGNATURE ESSENTIAL PF LTX SZ8

## (undated) DEVICE — SUT MNCRYL 3/0 PS2 18IN MCP497G

## (undated) DEVICE — GLV SURG PREMIERPRO ORTHO LTX PF SZ8 BRN

## (undated) DEVICE — DUAL CUT SAGITTAL BLADE

## (undated) DEVICE — SYS CLS SKIN PREMIERPRO EXOFINFUSION 22CM

## (undated) DEVICE — OPTIFOAM GENTLE SA, POSTOP, 4X12: Brand: MEDLINE

## (undated) DEVICE — PATIENT RETURN ELECTRODE, SINGLE-USE, CONTACT QUALITY MONITORING, ADULT, WITH 9FT CORD, FOR PATIENTS WEIGING OVER 33LBS. (15KG): Brand: MEGADYNE

## (undated) DEVICE — SKIN PREP TRAY W/CHG: Brand: MEDLINE INDUSTRIES, INC.

## (undated) DEVICE — SYR CONTRL LUERLOK 10CC

## (undated) DEVICE — STPLR SKIN VISISTAT WD 35CT

## (undated) DEVICE — SOL ISO/ALC 70PCT 4OZ

## (undated) DEVICE — IMMOB KN 3PNL DLX CANVS 22IN BLU

## (undated) DEVICE — GLV SURG PREMIERPRO ORTHO LTX PF SZ7.5 BRN

## (undated) DEVICE — PENCL SMOKE/EVAC MEGADYNE TELESCP 10FT

## (undated) DEVICE — MAT FLR ABSORBENT LG 4FT 10 2.5FT